# Patient Record
Sex: FEMALE | Race: WHITE | Employment: UNEMPLOYED
[De-identification: names, ages, dates, MRNs, and addresses within clinical notes are randomized per-mention and may not be internally consistent; named-entity substitution may affect disease eponyms.]

---

## 2017-02-07 DIAGNOSIS — F41.1 GAD (GENERALIZED ANXIETY DISORDER): ICD-10-CM

## 2017-02-07 RX ORDER — PROPRANOLOL HCL 60 MG
CAPSULE, EXTENDED RELEASE 24HR ORAL
Qty: 30 CAPSULE | Refills: 2 | Status: SHIPPED | OUTPATIENT
Start: 2017-02-07 | End: 2017-05-12 | Stop reason: SDUPTHER

## 2017-02-08 ENCOUNTER — OFFICE VISIT (OUTPATIENT)
Dept: INTERNAL MEDICINE | Facility: CLINIC | Age: 37
End: 2017-02-08

## 2017-02-08 VITALS
HEART RATE: 86 BPM | HEIGHT: 65 IN | DIASTOLIC BLOOD PRESSURE: 77 MMHG | BODY MASS INDEX: 26.82 KG/M2 | WEIGHT: 161 LBS | SYSTOLIC BLOOD PRESSURE: 116 MMHG

## 2017-02-08 DIAGNOSIS — F41.0 PANIC DISORDER: ICD-10-CM

## 2017-02-08 DIAGNOSIS — F41.1 GAD (GENERALIZED ANXIETY DISORDER): Primary | ICD-10-CM

## 2017-02-08 PROCEDURE — 99213 OFFICE O/P EST LOW 20 MIN: CPT | Performed by: INTERNAL MEDICINE

## 2017-02-08 RX ORDER — ESCITALOPRAM OXALATE 10 MG/1
10 TABLET ORAL DAILY
Qty: 30 TABLET | Refills: 2 | Status: SHIPPED | OUTPATIENT
Start: 2017-02-08 | End: 2017-07-14

## 2017-05-12 DIAGNOSIS — F41.1 GAD (GENERALIZED ANXIETY DISORDER): ICD-10-CM

## 2017-05-12 RX ORDER — PROPRANOLOL HCL 60 MG
CAPSULE, EXTENDED RELEASE 24HR ORAL
Qty: 30 CAPSULE | Refills: 1 | Status: CANCELLED | OUTPATIENT
Start: 2017-05-12

## 2017-05-14 RX ORDER — PROPRANOLOL HCL 60 MG
CAPSULE, EXTENDED RELEASE 24HR ORAL
Qty: 30 CAPSULE | Refills: 2 | Status: SHIPPED | OUTPATIENT
Start: 2017-05-14 | End: 2017-06-30 | Stop reason: SDUPTHER

## 2017-06-01 ENCOUNTER — OFFICE VISIT (OUTPATIENT)
Dept: INTERNAL MEDICINE | Age: 37
End: 2017-06-01
Payer: COMMERCIAL

## 2017-06-01 VITALS
HEART RATE: 74 BPM | SYSTOLIC BLOOD PRESSURE: 153 MMHG | HEIGHT: 65 IN | WEIGHT: 163 LBS | DIASTOLIC BLOOD PRESSURE: 100 MMHG | BODY MASS INDEX: 27.16 KG/M2

## 2017-06-01 DIAGNOSIS — F41.1 GAD (GENERALIZED ANXIETY DISORDER): ICD-10-CM

## 2017-06-01 DIAGNOSIS — I10 ESSENTIAL HYPERTENSION: Primary | ICD-10-CM

## 2017-06-01 PROCEDURE — 99213 OFFICE O/P EST LOW 20 MIN: CPT | Performed by: INTERNAL MEDICINE

## 2017-06-01 RX ORDER — BLOOD PRESSURE TEST KIT
KIT MISCELLANEOUS
Qty: 1 KIT | Refills: 0 | Status: SHIPPED | OUTPATIENT
Start: 2017-06-01 | End: 2017-06-30 | Stop reason: SDUPTHER

## 2017-06-01 RX ORDER — IBUPROFEN 800 MG/1
TABLET ORAL
COMMUNITY
Start: 2017-02-23 | End: 2017-06-07 | Stop reason: ALTCHOICE

## 2017-06-01 RX ORDER — AZITHROMYCIN 250 MG/1
TABLET, FILM COATED ORAL
COMMUNITY
Start: 2017-03-27 | End: 2017-06-07 | Stop reason: ALTCHOICE

## 2017-06-01 RX ORDER — LISINOPRIL 10 MG/1
10 TABLET ORAL DAILY
Qty: 30 TABLET | Refills: 2 | Status: SHIPPED | OUTPATIENT
Start: 2017-06-01 | End: 2017-09-14 | Stop reason: SDUPTHER

## 2017-06-01 ASSESSMENT — PATIENT HEALTH QUESTIONNAIRE - PHQ9
1. LITTLE INTEREST OR PLEASURE IN DOING THINGS: 0
SUM OF ALL RESPONSES TO PHQ9 QUESTIONS 1 & 2: 0
2. FEELING DOWN, DEPRESSED OR HOPELESS: 0
SUM OF ALL RESPONSES TO PHQ QUESTIONS 1-9: 0

## 2017-06-30 ENCOUNTER — OFFICE VISIT (OUTPATIENT)
Dept: INTERNAL MEDICINE | Age: 37
End: 2017-06-30
Payer: COMMERCIAL

## 2017-06-30 ENCOUNTER — HOSPITAL ENCOUNTER (OUTPATIENT)
Age: 37
Setting detail: SPECIMEN
Discharge: HOME | End: 2017-06-30
Payer: COMMERCIAL

## 2017-06-30 VITALS
SYSTOLIC BLOOD PRESSURE: 162 MMHG | HEART RATE: 78 BPM | DIASTOLIC BLOOD PRESSURE: 103 MMHG | WEIGHT: 164 LBS | BODY MASS INDEX: 27.32 KG/M2 | HEIGHT: 65 IN

## 2017-06-30 DIAGNOSIS — R10.13 DYSPEPSIA: ICD-10-CM

## 2017-06-30 DIAGNOSIS — I10 ESSENTIAL HYPERTENSION: ICD-10-CM

## 2017-06-30 DIAGNOSIS — Z12.4 CERVICAL CANCER SCREENING: Primary | ICD-10-CM

## 2017-06-30 DIAGNOSIS — F41.1 GAD (GENERALIZED ANXIETY DISORDER): ICD-10-CM

## 2017-06-30 PROCEDURE — 99213 OFFICE O/P EST LOW 20 MIN: CPT | Performed by: INTERNAL MEDICINE

## 2017-06-30 RX ORDER — BLOOD PRESSURE TEST KIT
KIT MISCELLANEOUS
Qty: 1 KIT | Refills: 0 | Status: SHIPPED | OUTPATIENT
Start: 2017-06-30 | End: 2017-09-01

## 2017-06-30 RX ORDER — PROPRANOLOL HCL 60 MG
CAPSULE, EXTENDED RELEASE 24HR ORAL
Qty: 30 CAPSULE | Refills: 2 | Status: SHIPPED | OUTPATIENT
Start: 2017-06-30 | End: 2017-11-14

## 2017-06-30 RX ORDER — PANTOPRAZOLE SODIUM 20 MG/1
20 TABLET, DELAYED RELEASE ORAL DAILY
Qty: 30 TABLET | Refills: 3 | Status: SHIPPED | OUTPATIENT
Start: 2017-06-30 | End: 2018-01-16 | Stop reason: SDUPTHER

## 2017-07-06 LAB — CYTOLOGY REPORT: NORMAL

## 2017-07-14 ENCOUNTER — OFFICE VISIT (OUTPATIENT)
Dept: INTERNAL MEDICINE | Age: 37
End: 2017-07-14
Payer: COMMERCIAL

## 2017-07-14 VITALS
HEIGHT: 65 IN | DIASTOLIC BLOOD PRESSURE: 64 MMHG | WEIGHT: 163 LBS | SYSTOLIC BLOOD PRESSURE: 139 MMHG | HEART RATE: 60 BPM | BODY MASS INDEX: 27.16 KG/M2

## 2017-07-14 DIAGNOSIS — F41.1 GAD (GENERALIZED ANXIETY DISORDER): Primary | ICD-10-CM

## 2017-07-14 DIAGNOSIS — I10 ESSENTIAL HYPERTENSION: ICD-10-CM

## 2017-07-14 PROCEDURE — 99213 OFFICE O/P EST LOW 20 MIN: CPT | Performed by: INTERNAL MEDICINE

## 2017-07-14 RX ORDER — BUPROPION HYDROCHLORIDE 150 MG/1
150 TABLET ORAL EVERY MORNING
Qty: 30 TABLET | Refills: 3 | Status: SHIPPED | OUTPATIENT
Start: 2017-07-14 | End: 2018-01-16 | Stop reason: SDUPTHER

## 2017-09-01 ENCOUNTER — OFFICE VISIT (OUTPATIENT)
Dept: INTERNAL MEDICINE | Age: 37
End: 2017-09-01
Payer: COMMERCIAL

## 2017-09-01 VITALS
WEIGHT: 170 LBS | SYSTOLIC BLOOD PRESSURE: 112 MMHG | BODY MASS INDEX: 28.32 KG/M2 | HEIGHT: 65 IN | HEART RATE: 64 BPM | DIASTOLIC BLOOD PRESSURE: 83 MMHG

## 2017-09-01 DIAGNOSIS — T78.40XA RASH DUE TO ALLERGY: Primary | ICD-10-CM

## 2017-09-01 DIAGNOSIS — R21 RASH DUE TO ALLERGY: Primary | ICD-10-CM

## 2017-09-01 PROCEDURE — 99212 OFFICE O/P EST SF 10 MIN: CPT | Performed by: INTERNAL MEDICINE

## 2017-09-01 RX ORDER — CETIRIZINE HYDROCHLORIDE 10 MG/1
10 TABLET ORAL DAILY
Qty: 30 TABLET | Refills: 0 | Status: SHIPPED | OUTPATIENT
Start: 2017-09-01 | End: 2018-02-05

## 2017-09-01 RX ORDER — DIAPER,BRIEF,INFANT-TODD,DISP
EACH MISCELLANEOUS
Qty: 3 TUBE | Refills: 0 | Status: SHIPPED | OUTPATIENT
Start: 2017-09-01 | End: 2019-12-11

## 2017-09-14 DIAGNOSIS — I10 ESSENTIAL HYPERTENSION: ICD-10-CM

## 2017-09-15 RX ORDER — LISINOPRIL 10 MG/1
TABLET ORAL
Qty: 30 TABLET | Refills: 1 | Status: SHIPPED | OUTPATIENT
Start: 2017-09-15 | End: 2017-11-14 | Stop reason: SDUPTHER

## 2017-11-14 DIAGNOSIS — I10 ESSENTIAL HYPERTENSION: ICD-10-CM

## 2017-11-14 DIAGNOSIS — F41.1 GAD (GENERALIZED ANXIETY DISORDER): ICD-10-CM

## 2017-11-14 RX ORDER — PROPRANOLOL HCL 60 MG
CAPSULE, EXTENDED RELEASE 24HR ORAL
Qty: 30 CAPSULE | Refills: 1 | Status: SHIPPED | OUTPATIENT
Start: 2017-11-14 | End: 2018-01-16 | Stop reason: SDUPTHER

## 2017-11-14 RX ORDER — LISINOPRIL 10 MG/1
TABLET ORAL
Qty: 30 TABLET | Refills: 1 | Status: SHIPPED | OUTPATIENT
Start: 2017-11-14 | End: 2018-01-16 | Stop reason: SDUPTHER

## 2018-01-16 DIAGNOSIS — R10.13 DYSPEPSIA: ICD-10-CM

## 2018-01-16 DIAGNOSIS — I10 ESSENTIAL HYPERTENSION: ICD-10-CM

## 2018-01-16 DIAGNOSIS — F41.1 GAD (GENERALIZED ANXIETY DISORDER): ICD-10-CM

## 2018-01-16 RX ORDER — BUPROPION HYDROCHLORIDE 150 MG/1
TABLET ORAL
Qty: 30 TABLET | Refills: 2 | Status: SHIPPED | OUTPATIENT
Start: 2018-01-16 | End: 2018-02-05 | Stop reason: SDUPTHER

## 2018-01-16 RX ORDER — PROPRANOLOL HCL 60 MG
CAPSULE, EXTENDED RELEASE 24HR ORAL
Qty: 30 CAPSULE | Refills: 0 | Status: SHIPPED | OUTPATIENT
Start: 2018-01-16 | End: 2018-02-05 | Stop reason: SDUPTHER

## 2018-01-16 RX ORDER — LISINOPRIL 10 MG/1
TABLET ORAL
Qty: 30 TABLET | Refills: 0 | Status: SHIPPED | OUTPATIENT
Start: 2018-01-16 | End: 2018-02-05 | Stop reason: SDUPTHER

## 2018-01-16 RX ORDER — PANTOPRAZOLE SODIUM 20 MG/1
TABLET, DELAYED RELEASE ORAL
Qty: 30 TABLET | Refills: 2 | Status: SHIPPED | OUTPATIENT
Start: 2018-01-16 | End: 2018-02-05 | Stop reason: ALTCHOICE

## 2018-01-16 NOTE — TELEPHONE ENCOUNTER
Refill on Pantoprazole, Propranolol, Lisinopril, and Bupropion. Last seen on 9/1/17. Left VM for patient to call and schedule appointment, medications pending. Next Visit Date:  No future appointments.     Health Maintenance   Topic Date Due    Flu vaccine (1) 09/01/2017    Potassium monitoring  01/26/2018    Creatinine monitoring  01/26/2018    Cervical cancer screen  06/30/2018    DTaP/Tdap/Td vaccine (2 - Td) 10/07/2026    Pneumococcal med risk  Completed    HIV screen  Completed       Hemoglobin A1C (%)   Date Value   03/30/2016 5.2             ( goal A1C is < 7)   No results found for: LABMICR  LDL Cholesterol (mg/dL)   Date Value   03/30/2016 87       (goal LDL is <100)   AST (U/L)   Date Value   03/30/2016 16     ALT (U/L)   Date Value   03/30/2016 13     BUN (mg/dL)   Date Value   01/26/2017 16     BP Readings from Last 3 Encounters:   09/01/17 112/83   07/14/17 139/64   06/30/17 (!) 162/103          (goal 120/80)    All Future Testing planned in [facility]  Lab Frequency Next Occurrence               Patient Active Problem List:     Heavy menstrual bleeding     HUE (generalized anxiety disorder)     Narcotic addiction ([facility] [address]     Panic disorder     Cigarette nicotine dependence without complication     History of heroin abuse     Essential hypertension

## 2018-02-05 ENCOUNTER — OFFICE VISIT (OUTPATIENT)
Dept: INTERNAL MEDICINE | Age: 38
End: 2018-02-05
Payer: COMMERCIAL

## 2018-02-05 ENCOUNTER — HOSPITAL ENCOUNTER (OUTPATIENT)
Age: 38
Setting detail: SPECIMEN
Discharge: HOME | End: 2018-02-05
Payer: COMMERCIAL

## 2018-02-05 VITALS
HEART RATE: 66 BPM | HEIGHT: 65 IN | DIASTOLIC BLOOD PRESSURE: 85 MMHG | WEIGHT: 172 LBS | BODY MASS INDEX: 28.66 KG/M2 | SYSTOLIC BLOOD PRESSURE: 127 MMHG

## 2018-02-05 DIAGNOSIS — R07.89 ATYPICAL CHEST PAIN: ICD-10-CM

## 2018-02-05 DIAGNOSIS — K21.9 GASTROESOPHAGEAL REFLUX DISEASE WITHOUT ESOPHAGITIS: ICD-10-CM

## 2018-02-05 DIAGNOSIS — I10 ESSENTIAL HYPERTENSION: ICD-10-CM

## 2018-02-05 DIAGNOSIS — Z00.00 HEALTH CARE MAINTENANCE: ICD-10-CM

## 2018-02-05 DIAGNOSIS — F41.1 GAD (GENERALIZED ANXIETY DISORDER): ICD-10-CM

## 2018-02-05 DIAGNOSIS — G43.809 OTHER MIGRAINE WITHOUT STATUS MIGRAINOSUS, NOT INTRACTABLE: Primary | ICD-10-CM

## 2018-02-05 LAB
ABSOLUTE EOS #: 0.18 K/UL (ref 0–0.44)
ABSOLUTE IMMATURE GRANULOCYTE: <0.03 K/UL (ref 0–0.3)
ABSOLUTE LYMPH #: 1.54 K/UL (ref 1.1–3.7)
ABSOLUTE MONO #: 0.56 K/UL (ref 0.1–1.2)
ALBUMIN SERPL-MCNC: 4.5 G/DL (ref 3.5–5.2)
ALBUMIN/GLOBULIN RATIO: 1.5 (ref 1–2.5)
ALP BLD-CCNC: 72 U/L (ref 35–104)
ALT SERPL-CCNC: 20 U/L (ref 5–33)
ANION GAP SERPL CALCULATED.3IONS-SCNC: 14 MMOL/L (ref 9–17)
AST SERPL-CCNC: 31 U/L
BASOPHILS # BLD: 1 % (ref 0–2)
BASOPHILS ABSOLUTE: 0.06 K/UL (ref 0–0.2)
BILIRUB SERPL-MCNC: 0.69 MG/DL (ref 0.3–1.2)
BUN BLDV-MCNC: 13 MG/DL (ref 6–20)
BUN/CREAT BLD: NORMAL (ref 9–20)
CALCIUM SERPL-MCNC: 9.1 MG/DL (ref 8.6–10.4)
CHLORIDE BLD-SCNC: 103 MMOL/L (ref 98–107)
CHOLESTEROL/HDL RATIO: 3.1
CHOLESTEROL: 176 MG/DL
CO2: 21 MMOL/L (ref 20–31)
CREAT SERPL-MCNC: 0.61 MG/DL (ref 0.5–0.9)
DIFFERENTIAL TYPE: NORMAL
EOSINOPHILS RELATIVE PERCENT: 3 % (ref 1–4)
ESTIMATED AVERAGE GLUCOSE: 80 MG/DL
GFR AFRICAN AMERICAN: >60 ML/MIN
GFR NON-AFRICAN AMERICAN: >60 ML/MIN
GFR SERPL CREATININE-BSD FRML MDRD: NORMAL ML/MIN/{1.73_M2}
GFR SERPL CREATININE-BSD FRML MDRD: NORMAL ML/MIN/{1.73_M2}
GLUCOSE BLD-MCNC: 99 MG/DL (ref 70–99)
HBA1C MFR BLD: 4.4 % (ref 4–6)
HCT VFR BLD CALC: 42.6 % (ref 36.3–47.1)
HDLC SERPL-MCNC: 57 MG/DL
HEMOGLOBIN: 14.2 G/DL (ref 11.9–15.1)
IMMATURE GRANULOCYTES: 0 %
LDL CHOLESTEROL: 84 MG/DL (ref 0–130)
LYMPHOCYTES # BLD: 24 % (ref 24–43)
MCH RBC QN AUTO: 30.5 PG (ref 25.2–33.5)
MCHC RBC AUTO-ENTMCNC: 33.3 G/DL (ref 28.4–34.8)
MCV RBC AUTO: 91.4 FL (ref 82.6–102.9)
MONOCYTES # BLD: 9 % (ref 3–12)
NRBC AUTOMATED: 0 PER 100 WBC
PDW BLD-RTO: 12.2 % (ref 11.8–14.4)
PLATELET # BLD: 239 K/UL (ref 138–453)
PLATELET ESTIMATE: NORMAL
PMV BLD AUTO: 10.3 FL (ref 8.1–13.5)
POTASSIUM SERPL-SCNC: 4.8 MMOL/L (ref 3.7–5.3)
RBC # BLD: 4.66 M/UL (ref 3.95–5.11)
RBC # BLD: NORMAL 10*6/UL
SEG NEUTROPHILS: 63 % (ref 36–65)
SEGMENTED NEUTROPHILS ABSOLUTE COUNT: 4.09 K/UL (ref 1.5–8.1)
SODIUM BLD-SCNC: 138 MMOL/L (ref 135–144)
TOTAL PROTEIN: 7.5 G/DL (ref 6.4–8.3)
TRIGL SERPL-MCNC: 175 MG/DL
VLDLC SERPL CALC-MCNC: ABNORMAL MG/DL (ref 1–30)
WBC # BLD: 6.4 K/UL (ref 3.5–11.3)
WBC # BLD: NORMAL 10*3/UL

## 2018-02-05 PROCEDURE — 85025 COMPLETE CBC W/AUTO DIFF WBC: CPT

## 2018-02-05 PROCEDURE — 99214 OFFICE O/P EST MOD 30 MIN: CPT | Performed by: INTERNAL MEDICINE

## 2018-02-05 PROCEDURE — 80061 LIPID PANEL: CPT

## 2018-02-05 PROCEDURE — 36415 COLL VENOUS BLD VENIPUNCTURE: CPT

## 2018-02-05 PROCEDURE — 80053 COMPREHEN METABOLIC PANEL: CPT

## 2018-02-05 PROCEDURE — 83036 HEMOGLOBIN GLYCOSYLATED A1C: CPT

## 2018-02-05 RX ORDER — OMEPRAZOLE 20 MG/1
20 CAPSULE, DELAYED RELEASE ORAL
Qty: 90 CAPSULE | Refills: 1 | Status: SHIPPED | OUTPATIENT
Start: 2018-02-05 | End: 2018-08-21 | Stop reason: SDUPTHER

## 2018-02-05 RX ORDER — LISINOPRIL 10 MG/1
TABLET ORAL
Qty: 90 TABLET | Refills: 1 | Status: SHIPPED | OUTPATIENT
Start: 2018-02-05 | End: 2018-08-15 | Stop reason: SDUPTHER

## 2018-02-05 RX ORDER — BUPROPION HYDROCHLORIDE 150 MG/1
TABLET ORAL
Qty: 90 TABLET | Refills: 1 | Status: SHIPPED | OUTPATIENT
Start: 2018-02-05 | End: 2018-08-13 | Stop reason: SDUPTHER

## 2018-02-05 RX ORDER — PROPRANOLOL HCL 60 MG
CAPSULE, EXTENDED RELEASE 24HR ORAL
Qty: 90 CAPSULE | Refills: 1 | Status: SHIPPED | OUTPATIENT
Start: 2018-02-05 | End: 2018-08-15 | Stop reason: SDUPTHER

## 2018-02-05 RX ORDER — ASPIRIN 81 MG/1
81 TABLET, CHEWABLE ORAL DAILY
Qty: 90 TABLET | Refills: 1 | Status: SHIPPED | OUTPATIENT
Start: 2018-02-05 | End: 2018-11-02 | Stop reason: SDUPTHER

## 2018-02-05 RX ORDER — TOPIRAMATE 25 MG/1
25 TABLET ORAL 2 TIMES DAILY
Qty: 60 TABLET | Refills: 2 | Status: SHIPPED | OUTPATIENT
Start: 2018-02-05 | End: 2018-08-21

## 2018-02-05 NOTE — PROGRESS NOTES
appearance - well appearing, not in  distress. Mental status - alert and cooperative . Head: Normocephalic, without obvious abnormality, atraumatic. Eye: PERRL, conjunctiva/corneas clear, EOM's intact. Neck - Supple, no significant adenopathy . Chest - Clear to auscultation, no wheezes, rales or rhonchi, symmetric air entry. Heart -  regular rhythm, normal S1, S2, no murmurs. Abdomen - Soft, nontender, nondistended, no masses or organomegaly. Neurological - Alert, oriented, normal speech, no focal findings or movement disorder noted. Musculoskeletal - No joint tenderness, deformity or swelling. Extremities -  No pedal edema, no clubbing or cyanosis. Labs:       Chemistry        Component Value Date/Time     01/26/2017 1023    K 4.5 01/26/2017 1023     01/26/2017 1023    CO2 17 (L) 01/26/2017 1023    BUN 16 01/26/2017 1023    CREATININE 0.77 01/26/2017 1023        Component Value Date/Time    CALCIUM 9.2 01/26/2017 1023    ALKPHOS 86 03/30/2016 1048    AST 16 03/30/2016 1048    ALT 13 03/30/2016 1048    BILITOT 0.38 03/30/2016 1048          No results for input(s): GLUCOSE in the last 72 hours. Lab Results   Component Value Date    WBC 11.8 (H) 01/26/2017    HGB 14.7 01/26/2017    HCT 41.7 01/26/2017    MCV 84.6 01/26/2017     01/26/2017     Lab Results   Component Value Date    TSH 1.73 04/26/2016     Lab Results   Component Value Date    LABA1C 5.2 03/30/2016     No results found for: LABMICR, CNMS10NRT  No components found for: CHLPL  Lab Results   Component Value Date    TRIG 101 03/30/2016     Lab Results   Component Value Date    HDL 44 03/30/2016     Lab Results   Component Value Date    LDLCHOLESTEROL 87 03/30/2016     The ASCVD Risk score (Roverto Pandya et al., 2013) failed to calculate for the following reasons:     The 2013 ASCVD risk score is only valid for ages 36 to 78    Health Maintenance Due   Topic Date Due    Flu vaccine (1) 09/01/2017    Potassium monitoring  01/26/2018    Creatinine monitoring  01/26/2018        ASSESSMENT AND PLAN    1. Essential hypertension  Improved with current medication.   - lisinopril (PRINIVIL;ZESTRIL) 10 MG tablet; TAKE ONE TABLET BY MOUTH DAILY  Dispense: 90 tablet; Refill: 1  - CBC Auto Differential; Future  - Comprehensive Metabolic Panel; Future  - Lipid Panel; Future    2. HUE (generalized anxiety disorder)  Improved with current medication. - propranolol (INDERAL LA) 60 MG extended release capsule; TAKE ONE CAPSULE BY MOUTH DAILY  Dispense: 90 capsule; Refill: 1  - buPROPion (WELLBUTRIN XL) 150 MG extended release tablet; TAKE ONE TABLET BY MOUTH EVERY MORNING  Dispense: 90 tablet; Refill: 1    3. Atypical chest pain  Resolved , was due to anxiety. - aspirin 81 MG chewable tablet; Take 1 tablet by mouth daily  Dispense: 90 tablet; Refill: 1    4. Other migraine without status migrainosus, not intractable    - topiramate (TOPAMAX) 25 MG tablet; Take 1 tablet by mouth 2 times daily  Dispense: 60 tablet; Refill: 2    5. Gastroesophageal reflux disease without esophagitis    - omeprazole (PRILOSEC) 20 MG delayed release capsule; Take 1 capsule by mouth daily (with breakfast)  Dispense: 90 capsule; Refill: 1    6. Health care maintenance    - Hemoglobin A1C; Future      · Labs on ordered   · Start Topamax for Migraine headaches   · Change protonix to omeprazole . · meds refilled   · Ashish Saldana received counseling on the following healthy behaviors: exercise and medication adherence  · Discussed use, benefit, and side effects of prescribed medications. Barriers to medication compliance addressed. All patient questions answered. Pt voiced understanding.    · The return visit should be in 6 months      Wilberto Bolden MD  Attending and Faculty Internal Medicine  [facility] [address] [facility]  [address]  Dept: [phone]  2/5/18      This note is created with the

## 2018-02-05 NOTE — PATIENT INSTRUCTIONS
Clinic will contact patient for six (6) month follow up, [phone], option 3. Your doctor has ordered fasting blood work. It can be done at [facility] or at the hospital. Casey Stokes will need to fast for 12 hours and bring order with you to registration department.     Anil Hernández

## 2018-06-18 DIAGNOSIS — R10.13 DYSPEPSIA: ICD-10-CM

## 2018-06-18 RX ORDER — PANTOPRAZOLE SODIUM 20 MG/1
TABLET, DELAYED RELEASE ORAL
Qty: 30 TABLET | Refills: 1 | Status: SHIPPED | OUTPATIENT
Start: 2018-06-18 | End: 2018-08-21

## 2018-08-13 DIAGNOSIS — F41.1 GAD (GENERALIZED ANXIETY DISORDER): ICD-10-CM

## 2018-08-13 RX ORDER — BUPROPION HYDROCHLORIDE 150 MG/1
TABLET ORAL
Qty: 90 TABLET | Refills: 1 | Status: SHIPPED | OUTPATIENT
Start: 2018-08-13 | End: 2018-11-02 | Stop reason: SDUPTHER

## 2018-08-13 NOTE — TELEPHONE ENCOUNTER
PC from patient requesting refill on Wellbutrin, Medication Pended. Phone Number and Pharmacy Confirmed. Pt last seen on 2/5/18, Next Appt is 8/21/18.     Health Maintenance   Topic Date Due    Cervical cancer screen  06/30/2018    Flu vaccine (1) 09/01/2018    Potassium monitoring  02/05/2019    Creatinine monitoring  02/05/2019    DTaP/Tdap/Td vaccine (2 - Td) 10/07/2026    HIV screen  Completed       Hemoglobin A1C (%)   Date Value   02/05/2018 4.4   03/30/2016 5.2             ( goal A1C is < 7)   No results found for: LABMICR  LDL Cholesterol (mg/dL)   Date Value   02/05/2018 84       (goal LDL is <100)   AST (U/L)   Date Value   02/05/2018 31     ALT (U/L)   Date Value   02/05/2018 20     BUN (mg/dL)   Date Value   02/05/2018 13     BP Readings from Last 3 Encounters:   02/05/18 127/85   09/01/17 112/83   07/14/17 139/64          (goal 120/80)    All Future Testing planned in [facility]  Lab Frequency Next Occurrence       Next Visit Date:  Future Appointments  Date Time Provider Paul Daii   8/15/2018 10:00 AM Benita Arreguin PSYD [facility] [address]   8/21/2018 1:45 PM Alexsander Floyd MD [facility]            Patient Active Problem List:     Heavy menstrual bleeding     HUE (generalized anxiety disorder)     Narcotic addiction ([facility] [address]     Panic disorder     Cigarette nicotine dependence without complication     History of heroin abuse     Essential hypertension

## 2018-08-21 ENCOUNTER — OFFICE VISIT (OUTPATIENT)
Dept: INTERNAL MEDICINE | Age: 38
End: 2018-08-21
Payer: COMMERCIAL

## 2018-08-21 VITALS
DIASTOLIC BLOOD PRESSURE: 81 MMHG | BODY MASS INDEX: 26.49 KG/M2 | HEART RATE: 72 BPM | HEIGHT: 65 IN | WEIGHT: 159 LBS | SYSTOLIC BLOOD PRESSURE: 122 MMHG

## 2018-08-21 DIAGNOSIS — J30.9 ALLERGIC SINUSITIS: ICD-10-CM

## 2018-08-21 DIAGNOSIS — I10 ESSENTIAL HYPERTENSION: Primary | ICD-10-CM

## 2018-08-21 DIAGNOSIS — K21.9 GASTROESOPHAGEAL REFLUX DISEASE WITHOUT ESOPHAGITIS: ICD-10-CM

## 2018-08-21 PROCEDURE — 99213 OFFICE O/P EST LOW 20 MIN: CPT | Performed by: INTERNAL MEDICINE

## 2018-08-21 RX ORDER — FEXOFENADINE HCL 180 MG/1
180 TABLET ORAL DAILY
Qty: 30 TABLET | Refills: 2 | Status: SHIPPED | OUTPATIENT
Start: 2018-08-21 | End: 2019-10-15

## 2018-08-21 RX ORDER — OMEPRAZOLE 20 MG/1
20 CAPSULE, DELAYED RELEASE ORAL
Qty: 90 CAPSULE | Refills: 1 | Status: SHIPPED | OUTPATIENT
Start: 2018-08-21 | End: 2018-11-02

## 2018-08-21 ASSESSMENT — PATIENT HEALTH QUESTIONNAIRE - PHQ9
1. LITTLE INTEREST OR PLEASURE IN DOING THINGS: 0
SUM OF ALL RESPONSES TO PHQ QUESTIONS 1-9: 0
SUM OF ALL RESPONSES TO PHQ QUESTIONS 1-9: 0
SUM OF ALL RESPONSES TO PHQ9 QUESTIONS 1 & 2: 0
2. FEELING DOWN, DEPRESSED OR HOPELESS: 0

## 2018-08-21 NOTE — PROGRESS NOTES
Chronic Disease Visit Information    BP Readings from Last 3 Encounters:   02/05/18 127/85   09/01/17 112/83   07/14/17 139/64          Hemoglobin A1C (%)   Date Value   02/05/2018 4.4   03/30/2016 5.2     LDL Cholesterol (mg/dL)   Date Value   02/05/2018 84     HDL (mg/dL)   Date Value   02/05/2018 57     BUN (mg/dL)   Date Value   02/05/2018 13     CREATININE (mg/dL)   Date Value   02/05/2018 0.61     Glucose (mg/dL)   Date Value   02/05/2018 99            Have you changed or started any medications since your last visit including any over-the-counter medicines, vitamins, or herbal medicines? no   Are you having any side effects from any of your medications? -  no  Have you stopped taking any of your medications? Is so, why? -  no    Have you seen any other physician or provider since your last visit? No  Have you had any other diagnostic tests since your last visit? No  Have you been seen in the emergency room and/or had an admission to a hospital since we last saw you? Yes - Records Requested  Have you had your annual diabetic retinal (eye) exam? No  Have you had your routine dental cleaning in the past 6 months? no    Have you activated your [employer] account? If not, what are your barriers?  No: declined     Patient Care Team:  Jackie Truong MD as PCP - General (Internal Medicine)         Medical History Review  Past Medical, Family, and Social History reviewed and does not contribute to the patient presenting condition    Health Maintenance   Topic Date Due    Cervical cancer screen  06/30/2018    Flu vaccine (1) 09/01/2018    Potassium monitoring  02/05/2019    Creatinine monitoring  02/05/2019    DTaP/Tdap/Td vaccine (2 - Td) 10/07/2026    HIV screen  Completed

## 2018-11-02 ENCOUNTER — OFFICE VISIT (OUTPATIENT)
Dept: INTERNAL MEDICINE | Age: 38
End: 2018-11-02
Payer: COMMERCIAL

## 2018-11-02 VITALS
BODY MASS INDEX: 25.83 KG/M2 | WEIGHT: 155 LBS | DIASTOLIC BLOOD PRESSURE: 78 MMHG | SYSTOLIC BLOOD PRESSURE: 113 MMHG | HEIGHT: 65 IN | HEART RATE: 76 BPM

## 2018-11-02 DIAGNOSIS — N91.2 AMENORRHEA: ICD-10-CM

## 2018-11-02 DIAGNOSIS — I10 ESSENTIAL HYPERTENSION: Primary | ICD-10-CM

## 2018-11-02 DIAGNOSIS — F41.1 GAD (GENERALIZED ANXIETY DISORDER): ICD-10-CM

## 2018-11-02 DIAGNOSIS — R53.83 FATIGUE, UNSPECIFIED TYPE: ICD-10-CM

## 2018-11-02 DIAGNOSIS — R07.89 ATYPICAL CHEST PAIN: ICD-10-CM

## 2018-11-02 PROCEDURE — 99213 OFFICE O/P EST LOW 20 MIN: CPT | Performed by: INTERNAL MEDICINE

## 2018-11-02 PROCEDURE — 99211 OFF/OP EST MAY X REQ PHY/QHP: CPT | Performed by: INTERNAL MEDICINE

## 2018-11-02 RX ORDER — LISINOPRIL 10 MG/1
TABLET ORAL
Qty: 90 TABLET | Refills: 1 | Status: SHIPPED | OUTPATIENT
Start: 2018-11-02 | End: 2019-02-26 | Stop reason: SDUPTHER

## 2018-11-02 RX ORDER — BUPROPION HYDROCHLORIDE 300 MG/1
TABLET ORAL
Qty: 90 TABLET | Refills: 1 | Status: SHIPPED | OUTPATIENT
Start: 2018-11-02 | End: 2019-02-26 | Stop reason: SDUPTHER

## 2018-11-02 RX ORDER — PROPRANOLOL HCL 60 MG
CAPSULE, EXTENDED RELEASE 24HR ORAL
Qty: 90 CAPSULE | Refills: 1 | Status: SHIPPED | OUTPATIENT
Start: 2018-11-02 | End: 2019-02-26 | Stop reason: SDUPTHER

## 2018-11-02 RX ORDER — ASPIRIN 81 MG/1
81 TABLET, CHEWABLE ORAL DAILY
Qty: 90 TABLET | Refills: 1 | Status: SHIPPED | OUTPATIENT
Start: 2018-11-02 | End: 2019-02-26 | Stop reason: SDUPTHER

## 2018-11-02 RX ORDER — OMEPRAZOLE 20 MG/1
20 CAPSULE, DELAYED RELEASE ORAL
Qty: 90 CAPSULE | Refills: 1 | Status: SHIPPED | OUTPATIENT
Start: 2018-11-02 | End: 2019-02-26 | Stop reason: SDUPTHER

## 2018-11-27 ENCOUNTER — OFFICE VISIT (OUTPATIENT)
Dept: OBGYN CLINIC | Age: 38
End: 2018-11-27
Payer: COMMERCIAL

## 2018-11-27 ENCOUNTER — HOSPITAL ENCOUNTER (OUTPATIENT)
Age: 38
Setting detail: SPECIMEN
Discharge: HOME | End: 2018-11-27
Payer: COMMERCIAL

## 2018-11-27 VITALS
WEIGHT: 159.38 LBS | HEIGHT: 65 IN | BODY MASS INDEX: 26.55 KG/M2 | DIASTOLIC BLOOD PRESSURE: 78 MMHG | HEART RATE: 86 BPM | SYSTOLIC BLOOD PRESSURE: 110 MMHG

## 2018-11-27 DIAGNOSIS — R45.86 MOOD SWINGS: ICD-10-CM

## 2018-11-27 DIAGNOSIS — Z01.419 WELL FEMALE EXAM WITH ROUTINE GYNECOLOGICAL EXAM: Primary | ICD-10-CM

## 2018-11-27 DIAGNOSIS — R53.83 FATIGUE, UNSPECIFIED TYPE: ICD-10-CM

## 2018-11-27 LAB — TSH SERPL DL<=0.05 MIU/L-ACNC: 1.38 MIU/L (ref 0.3–5)

## 2018-11-27 PROCEDURE — 99385 PREV VISIT NEW AGE 18-39: CPT | Performed by: OBSTETRICS & GYNECOLOGY

## 2018-11-28 NOTE — PROGRESS NOTES
and rhythm. . No S3 or S4. There was no murmur appreciated. Location, grade, and radiation are not applicable. Extremities: The patients extremities were without calf tenderness, edema, or varicosities. There was full range of motion in all four extremities. Abdomen: The abdomen was soft and non-tender. There were good bowel sounds in all quadrants and there was no guarding, rebound or rigidity. On evaluation there was no evidence of hepatosplenomegaly and there was no costal vertebral bianca tenderness bilaterally. No hernias were appreciated. Abdominal Scars: healed    Psych: The patient had a normal Orientation to: Time, Place, Person, and Situation  There is no Mood / Affect changes    Breast:  (Chest)  normal appearance, no masses or tenderness  Self breast exams were reviewed in detail. Literature was given. Pelvic Exam:  Vulva and vagina appear normal. Bimanual exam reveals normal uterus and adnexa. Rectal Exam:  def      Musculosk:  Normal Gait and station was noted. Digits were evaluated without abnormal findings. Range of motion, stability and strength were evaluated and found to be appropriate for the patients age. ASSESSMENT:      45 y.o. Annual  1. Well female exam with routine gynecological exam    2.  Mood swings         Chief Complaint   Patient presents with   [facility]    Gynecologic Exam          Past Medical History:   Diagnosis Date    Abnormal Pap smear of cervix     Anxiety     Chest pain     pt states had normal stress test    Depression     Drug abuse (HCC)     opiates    Heartburn     HSV-2 infection     Hypertension     Menorrhagia     Osteoarthritis     Pneumonia due to Staphylococcus aureus ([address]     PTSD (post-traumatic stress disorder)     Trichomonosis 4/18/16    on pap 4/18/16         Patient Active Problem List   Diagnosis    Heavy menstrual bleeding    HUE (generalized anxiety disorder)    Narcotic addiction ([address]    Panic disorder    Cigarette nicotine dependence without complication    History of heroin abuse    Essential hypertension          Hereditary Breast, Ovarian, Colon and Uterine Cancer screening Done. Tobacco & Secondary smoke risks reviewed; instructed on cessation and avoidance      Counseling Completed:  annual   PMS with migraines, has history of addiction, discussed C0q10 and Magnesium          PLAN:    Repeat pap every 1 year if negative. Mammograms every 1 year. If 35 yo and last mammogram was negative. Calcium and Vitamin D dosing reviewed. Colonoscopy screening reviewed as well as onset for bone density testing. Birth control and barrier recommendations discussed. STD counseling and prevention reviewed. Gardisil counseling completed for all patients 7-35 yo. Routine health maintenance per patients PCP.

## 2018-12-10 LAB — CYTOLOGY REPORT: NORMAL

## 2019-02-26 ENCOUNTER — OFFICE VISIT (OUTPATIENT)
Dept: INTERNAL MEDICINE | Age: 39
End: 2019-02-26
Payer: COMMERCIAL

## 2019-02-26 VITALS
WEIGHT: 155 LBS | SYSTOLIC BLOOD PRESSURE: 115 MMHG | DIASTOLIC BLOOD PRESSURE: 75 MMHG | BODY MASS INDEX: 25.79 KG/M2 | HEART RATE: 70 BPM

## 2019-02-26 DIAGNOSIS — I10 ESSENTIAL HYPERTENSION: ICD-10-CM

## 2019-02-26 DIAGNOSIS — M25.511 CHRONIC RIGHT SHOULDER PAIN: ICD-10-CM

## 2019-02-26 DIAGNOSIS — F41.1 GAD (GENERALIZED ANXIETY DISORDER): Primary | ICD-10-CM

## 2019-02-26 DIAGNOSIS — K21.9 GASTROESOPHAGEAL REFLUX DISEASE WITHOUT ESOPHAGITIS: ICD-10-CM

## 2019-02-26 DIAGNOSIS — F32.A DEPRESSION, UNSPECIFIED DEPRESSION TYPE: ICD-10-CM

## 2019-02-26 DIAGNOSIS — R07.89 ATYPICAL CHEST PAIN: ICD-10-CM

## 2019-02-26 DIAGNOSIS — Z00.00 HEALTH CARE MAINTENANCE: ICD-10-CM

## 2019-02-26 DIAGNOSIS — G89.29 CHRONIC RIGHT SHOULDER PAIN: ICD-10-CM

## 2019-02-26 PROCEDURE — 99211 OFF/OP EST MAY X REQ PHY/QHP: CPT | Performed by: INTERNAL MEDICINE

## 2019-02-26 PROCEDURE — 99214 OFFICE O/P EST MOD 30 MIN: CPT | Performed by: INTERNAL MEDICINE

## 2019-02-26 RX ORDER — PROPRANOLOL HCL 60 MG
CAPSULE, EXTENDED RELEASE 24HR ORAL
Qty: 90 CAPSULE | Refills: 1 | Status: SHIPPED | OUTPATIENT
Start: 2019-02-26 | End: 2019-10-15 | Stop reason: SDUPTHER

## 2019-02-26 RX ORDER — BUPROPION HYDROCHLORIDE 300 MG/1
TABLET ORAL
Qty: 90 TABLET | Refills: 1 | Status: SHIPPED | OUTPATIENT
Start: 2019-02-26 | End: 2019-10-15 | Stop reason: SDUPTHER

## 2019-02-26 RX ORDER — HYDROXYZINE HYDROCHLORIDE 25 MG/1
25 TABLET, FILM COATED ORAL 3 TIMES DAILY PRN
Qty: 60 TABLET | Refills: 1 | Status: SHIPPED | OUTPATIENT
Start: 2019-02-26 | End: 2019-06-13 | Stop reason: SDUPTHER

## 2019-02-26 RX ORDER — ASPIRIN 81 MG/1
81 TABLET, CHEWABLE ORAL DAILY
Qty: 90 TABLET | Refills: 1 | Status: SHIPPED | OUTPATIENT
Start: 2019-02-26 | End: 2019-12-11

## 2019-02-26 RX ORDER — LISINOPRIL 10 MG/1
TABLET ORAL
Qty: 90 TABLET | Refills: 1 | Status: SHIPPED | OUTPATIENT
Start: 2019-02-26 | End: 2019-10-15 | Stop reason: SDUPTHER

## 2019-02-26 RX ORDER — OMEPRAZOLE 20 MG/1
20 CAPSULE, DELAYED RELEASE ORAL
Qty: 90 CAPSULE | Refills: 1 | Status: SHIPPED | OUTPATIENT
Start: 2019-02-26 | End: 2019-10-15 | Stop reason: SDUPTHER

## 2019-02-26 ASSESSMENT — PATIENT HEALTH QUESTIONNAIRE - PHQ9
SUM OF ALL RESPONSES TO PHQ QUESTIONS 1-9: 0
SUM OF ALL RESPONSES TO PHQ QUESTIONS 1-9: 0
SUM OF ALL RESPONSES TO PHQ9 QUESTIONS 1 & 2: 0
1. LITTLE INTEREST OR PLEASURE IN DOING THINGS: 0
2. FEELING DOWN, DEPRESSED OR HOPELESS: 0

## 2019-06-04 ENCOUNTER — TELEPHONE (OUTPATIENT)
Dept: INTERNAL MEDICINE | Age: 39
End: 2019-06-04

## 2019-06-04 NOTE — LETTER
[address]  [address] [facility] [address] [facility]  Phone: [phone]  Fax: [phone]    Nadine Gay MD        June 4, 2019    [address]  [facility] [address]      Dear Norma Mireles: We are sending this letter because your PCP ordered [facility] for you to have done at your last visit here and they have not yet been completed. If you can please come to our office on the 2nd floor to  your orders to have them compelted. If you do not have a follow-up appointment scheduled you can either contact the office to make an appointment with us or you can make one when you come in to pick-up your orders. If you have any questions or concerns, please don't hesitate to call.     Sincerely,        Nadine Gay MD

## 2019-06-13 DIAGNOSIS — F41.1 GAD (GENERALIZED ANXIETY DISORDER): ICD-10-CM

## 2019-06-13 RX ORDER — HYDROXYZINE HYDROCHLORIDE 25 MG/1
TABLET, FILM COATED ORAL
Qty: 60 TABLET | Refills: 0 | Status: SHIPPED | OUTPATIENT
Start: 2019-06-13 | End: 2019-08-08 | Stop reason: SDUPTHER

## 2019-06-13 NOTE — TELEPHONE ENCOUNTER
E-scribe request for Hydroxyzine 25 MG. Please review and e-scribe if applicable. Next Visit Date:  Visit date not found    Health Maintenance   Topic Date Due    Varicella Vaccine (1 of 2 - 13+ 2-dose series) 04/14/1993    Potassium monitoring  02/05/2019    Creatinine monitoring  02/05/2019    Flu vaccine (Season Ended) 09/01/2019    Cervical cancer screen  11/27/2019    DTaP/Tdap/Td vaccine (2 - Td) 10/07/2026    HIV screen  Completed    Pneumococcal 0-64 years Vaccine  Aged Out             (applicable per patient's age: Cancer Screenings, Depression Screening, Fall Risk Screening, Immunizations)    Hemoglobin A1C (%)   Date Value   02/05/2018 4.4   03/30/2016 5.2     LDL Cholesterol (mg/dL)   Date Value   02/05/2018 84     AST (U/L)   Date Value   02/05/2018 31     ALT (U/L)   Date Value   02/05/2018 20     BUN (mg/dL)   Date Value   02/05/2018 13      (goal A1C is < 7)   (goal LDL is <100) need 30-50% reduction from baseline     BP Readings from Last 3 Encounters:   02/26/19 115/75   11/27/18 110/78   11/02/18 113/78    (goal /80)      All Future Testing planned in [facility]:  Lab Frequency Next Occurrence   CBC Auto Differential Once 07/26/2019   Comprehensive Metabolic Panel Once 06/15/1176   Hemoglobin A1C Once 09/04/2019   Lipid Panel Once 07/26/2019       Next Visit Date:  No future appointments.          Patient Active Problem List:     Heavy menstrual bleeding     HUE (generalized anxiety disorder)     Narcotic addiction ([facility] [address]     Panic disorder     Cigarette nicotine dependence without complication     History of heroin abuse     Essential hypertension

## 2019-08-08 DIAGNOSIS — F41.1 GAD (GENERALIZED ANXIETY DISORDER): ICD-10-CM

## 2019-08-09 RX ORDER — HYDROXYZINE HYDROCHLORIDE 25 MG/1
25 TABLET, FILM COATED ORAL 3 TIMES DAILY PRN
Qty: 30 TABLET | Refills: 0 | Status: SHIPPED | OUTPATIENT
Start: 2019-08-09 | End: 2019-10-15 | Stop reason: SDUPTHER

## 2019-09-12 ENCOUNTER — TELEPHONE (OUTPATIENT)
Dept: INTERNAL MEDICINE | Age: 39
End: 2019-09-12

## 2019-09-12 NOTE — LETTER
[address]  [address] [facility] [address]  Phone: [phone]  Fax: [phone]    Brittnee Gallagher MD        September 12, 2019    [address] [place] [id number]      Dear Norm Winston: We are sending this letter because your PCP ordered [facility] for you to have done at your last visit here and they have not yet been completed. If you can please come to our office on the 2nd floor to  your orders to have them compelted. If you do not have a follow-up appointment scheduled you can either contact the office to make an appointment with us or you can make one when you come in to pick-up your orders. If you have any questions or concerns, please don't hesitate to call.     Sincerely,        Brittnee Gallagher MD

## 2019-09-26 ENCOUNTER — HOSPITAL ENCOUNTER (OUTPATIENT)
Age: 39
Setting detail: SPECIMEN
Discharge: HOME | End: 2019-09-26
Payer: COMMERCIAL

## 2019-09-26 ENCOUNTER — HOSPITAL ENCOUNTER (OUTPATIENT)
Age: 39
Discharge: HOME | End: 2019-09-26
Payer: COMMERCIAL

## 2019-09-26 DIAGNOSIS — Z00.00 HEALTH CARE MAINTENANCE: ICD-10-CM

## 2019-09-26 DIAGNOSIS — I10 ESSENTIAL HYPERTENSION: ICD-10-CM

## 2019-09-26 LAB
ABSOLUTE EOS #: 0.13 K/UL (ref 0–0.44)
ABSOLUTE IMMATURE GRANULOCYTE: <0.03 K/UL (ref 0–0.3)
ABSOLUTE LYMPH #: 1.64 K/UL (ref 1.1–3.7)
ABSOLUTE MONO #: 0.32 K/UL (ref 0.1–1.2)
ALBUMIN SERPL-MCNC: 4.9 G/DL (ref 3.5–5.2)
ALBUMIN/GLOBULIN RATIO: 1.7 (ref 1–2.5)
ALP BLD-CCNC: 66 U/L (ref 35–104)
ALT SERPL-CCNC: 17 U/L (ref 5–33)
ANION GAP SERPL CALCULATED.3IONS-SCNC: 21 MMOL/L (ref 9–17)
AST SERPL-CCNC: 20 U/L
BASOPHILS # BLD: 1 % (ref 0–2)
BASOPHILS ABSOLUTE: 0.04 K/UL (ref 0–0.2)
BILIRUB SERPL-MCNC: 0.59 MG/DL (ref 0.3–1.2)
BUN BLDV-MCNC: 6 MG/DL (ref 6–20)
BUN/CREAT BLD: ABNORMAL (ref 9–20)
CALCIUM SERPL-MCNC: 9.7 MG/DL (ref 8.6–10.4)
CHLORIDE BLD-SCNC: 105 MMOL/L (ref 98–107)
CHOLESTEROL, FASTING: 173 MG/DL
CHOLESTEROL/HDL RATIO: 3.5
CO2: 15 MMOL/L (ref 20–31)
CREAT SERPL-MCNC: 0.71 MG/DL (ref 0.5–0.9)
DIFFERENTIAL TYPE: ABNORMAL
EOSINOPHILS RELATIVE PERCENT: 2 % (ref 1–4)
ESTIMATED AVERAGE GLUCOSE: 88 MG/DL
GFR AFRICAN AMERICAN: >60 ML/MIN
GFR NON-AFRICAN AMERICAN: >60 ML/MIN
GFR SERPL CREATININE-BSD FRML MDRD: ABNORMAL ML/MIN/{1.73_M2}
GFR SERPL CREATININE-BSD FRML MDRD: ABNORMAL ML/MIN/{1.73_M2}
GLUCOSE BLD-MCNC: 80 MG/DL (ref 70–99)
HBA1C MFR BLD: 4.7 % (ref 4–6)
HCT VFR BLD CALC: 45.9 % (ref 36.3–47.1)
HDLC SERPL-MCNC: 50 MG/DL
HEMOGLOBIN: 14.7 G/DL (ref 11.9–15.1)
IMMATURE GRANULOCYTES: 0 %
LDL CHOLESTEROL: 100 MG/DL (ref 0–130)
LYMPHOCYTES # BLD: 31 % (ref 24–43)
MCH RBC QN AUTO: 28.5 PG (ref 25.2–33.5)
MCHC RBC AUTO-ENTMCNC: 32 G/DL (ref 28.4–34.8)
MCV RBC AUTO: 89 FL (ref 82.6–102.9)
MONOCYTES # BLD: 6 % (ref 3–12)
NRBC AUTOMATED: 0 PER 100 WBC
PDW BLD-RTO: 11.9 % (ref 11.8–14.4)
PLATELET # BLD: 274 K/UL (ref 138–453)
PLATELET ESTIMATE: ABNORMAL
PMV BLD AUTO: 10.4 FL (ref 8.1–13.5)
POTASSIUM SERPL-SCNC: 4.4 MMOL/L (ref 3.7–5.3)
RBC # BLD: 5.16 M/UL (ref 3.95–5.11)
RBC # BLD: ABNORMAL 10*6/UL
SEG NEUTROPHILS: 60 % (ref 36–65)
SEGMENTED NEUTROPHILS ABSOLUTE COUNT: 3.17 K/UL (ref 1.5–8.1)
SODIUM BLD-SCNC: 141 MMOL/L (ref 135–144)
TOTAL PROTEIN: 7.8 G/DL (ref 6.4–8.3)
TRIGLYCERIDE, FASTING: 113 MG/DL
VLDLC SERPL CALC-MCNC: NORMAL MG/DL (ref 1–30)
WBC # BLD: 5.3 K/UL (ref 3.5–11.3)
WBC # BLD: ABNORMAL 10*3/UL

## 2019-09-26 PROCEDURE — 80053 COMPREHEN METABOLIC PANEL: CPT

## 2019-09-26 PROCEDURE — 83036 HEMOGLOBIN GLYCOSYLATED A1C: CPT

## 2019-09-26 PROCEDURE — 80061 LIPID PANEL: CPT

## 2019-09-26 PROCEDURE — 85025 COMPLETE CBC W/AUTO DIFF WBC: CPT

## 2019-09-26 PROCEDURE — 36415 COLL VENOUS BLD VENIPUNCTURE: CPT

## 2019-10-01 DIAGNOSIS — F41.1 GAD (GENERALIZED ANXIETY DISORDER): ICD-10-CM

## 2019-10-01 RX ORDER — HYDROXYZINE HYDROCHLORIDE 25 MG/1
25 TABLET, FILM COATED ORAL 3 TIMES DAILY PRN
Qty: 30 TABLET | Refills: 0 | OUTPATIENT
Start: 2019-10-01

## 2019-10-15 ENCOUNTER — OFFICE VISIT (OUTPATIENT)
Dept: INTERNAL MEDICINE | Age: 39
End: 2019-10-15
Payer: COMMERCIAL

## 2019-10-15 VITALS
SYSTOLIC BLOOD PRESSURE: 133 MMHG | HEART RATE: 107 BPM | BODY MASS INDEX: 24.03 KG/M2 | WEIGHT: 144.2 LBS | HEIGHT: 65 IN | DIASTOLIC BLOOD PRESSURE: 87 MMHG

## 2019-10-15 DIAGNOSIS — K21.9 GASTROESOPHAGEAL REFLUX DISEASE WITHOUT ESOPHAGITIS: ICD-10-CM

## 2019-10-15 DIAGNOSIS — E87.29 HIGH ANION GAP METABOLIC ACIDOSIS: ICD-10-CM

## 2019-10-15 DIAGNOSIS — E83.42 HYPOMAGNESEMIA: Primary | ICD-10-CM

## 2019-10-15 DIAGNOSIS — I10 ESSENTIAL HYPERTENSION: ICD-10-CM

## 2019-10-15 DIAGNOSIS — J01.00 ACUTE NON-RECURRENT MAXILLARY SINUSITIS: ICD-10-CM

## 2019-10-15 DIAGNOSIS — F41.1 GAD (GENERALIZED ANXIETY DISORDER): ICD-10-CM

## 2019-10-15 PROCEDURE — 99211 OFF/OP EST MAY X REQ PHY/QHP: CPT | Performed by: INTERNAL MEDICINE

## 2019-10-15 PROCEDURE — 99213 OFFICE O/P EST LOW 20 MIN: CPT | Performed by: INTERNAL MEDICINE

## 2019-10-15 RX ORDER — LISINOPRIL 10 MG/1
TABLET ORAL
Qty: 30 TABLET | Refills: 11 | Status: SHIPPED | OUTPATIENT
Start: 2019-10-15 | End: 2020-10-06

## 2019-10-15 RX ORDER — HYDROXYZINE HYDROCHLORIDE 25 MG/1
25 TABLET, FILM COATED ORAL 3 TIMES DAILY PRN
Qty: 30 TABLET | Refills: 11 | Status: SHIPPED | OUTPATIENT
Start: 2019-10-15 | End: 2019-11-14

## 2019-10-15 RX ORDER — OMEPRAZOLE 20 MG/1
20 CAPSULE, DELAYED RELEASE ORAL
Qty: 90 CAPSULE | Refills: 1 | Status: SHIPPED | OUTPATIENT
Start: 2019-10-15 | End: 2020-08-17 | Stop reason: SDUPTHER

## 2019-10-15 RX ORDER — FLUTICASONE PROPIONATE 50 MCG
1 SPRAY, SUSPENSION (ML) NASAL DAILY
Qty: 1 BOTTLE | Refills: 3 | Status: SHIPPED | OUTPATIENT
Start: 2019-10-15 | End: 2020-08-17

## 2019-10-15 RX ORDER — CALCIUM CARBONATE/VITAMIN D3 500-10/5ML
1 LIQUID (ML) ORAL NIGHTLY
Qty: 30 CAPSULE | Refills: 11 | Status: SHIPPED | OUTPATIENT
Start: 2019-10-15 | End: 2019-11-14

## 2019-10-15 RX ORDER — BUPROPION HYDROCHLORIDE 300 MG/1
TABLET ORAL
Qty: 30 TABLET | Refills: 11 | Status: SHIPPED | OUTPATIENT
Start: 2019-10-15 | End: 2020-11-12

## 2019-10-15 RX ORDER — PROPRANOLOL HCL 60 MG
CAPSULE, EXTENDED RELEASE 24HR ORAL
Qty: 30 CAPSULE | Refills: 11 | Status: SHIPPED | OUTPATIENT
Start: 2019-10-15 | End: 2020-10-06

## 2019-12-11 ENCOUNTER — HOSPITAL ENCOUNTER (OUTPATIENT)
Age: 39
Setting detail: SPECIMEN
Discharge: HOME | End: 2019-12-11
Payer: COMMERCIAL

## 2019-12-11 ENCOUNTER — OFFICE VISIT (OUTPATIENT)
Dept: OBGYN CLINIC | Age: 39
End: 2019-12-11
Payer: COMMERCIAL

## 2019-12-11 VITALS
BODY MASS INDEX: 22.64 KG/M2 | SYSTOLIC BLOOD PRESSURE: 129 MMHG | HEART RATE: 58 BPM | WEIGHT: 140.9 LBS | DIASTOLIC BLOOD PRESSURE: 83 MMHG | HEIGHT: 66 IN

## 2019-12-11 DIAGNOSIS — Z01.419 WOMEN'S ANNUAL ROUTINE GYNECOLOGICAL EXAMINATION: Primary | ICD-10-CM

## 2019-12-11 DIAGNOSIS — N95.1 PERIMENOPAUSE: ICD-10-CM

## 2019-12-11 DIAGNOSIS — R10.2 PELVIC PAIN: ICD-10-CM

## 2019-12-11 LAB
FOLLICLE STIMULATING HORMONE: 7.7 U/L (ref 1.7–21.5)
LH: 5.4 U/L (ref 1–95.6)
T4 TOTAL: 7.1 UG/DL (ref 4.5–12)
TSH SERPL DL<=0.05 MIU/L-ACNC: 1.44 MIU/L (ref 0.3–5)
VITAMIN D 25-HYDROXY: 58.4 NG/ML (ref 30–100)

## 2019-12-11 PROCEDURE — 99395 PREV VISIT EST AGE 18-39: CPT | Performed by: ADVANCED PRACTICE MIDWIFE

## 2019-12-11 ASSESSMENT — ENCOUNTER SYMPTOMS
GASTROINTESTINAL NEGATIVE: 1
RESPIRATORY NEGATIVE: 1

## 2019-12-11 ASSESSMENT — PATIENT HEALTH QUESTIONNAIRE - PHQ9
2. FEELING DOWN, DEPRESSED OR HOPELESS: 0
SUM OF ALL RESPONSES TO PHQ9 QUESTIONS 1 & 2: 0
SUM OF ALL RESPONSES TO PHQ QUESTIONS 1-9: 0
1. LITTLE INTEREST OR PLEASURE IN DOING THINGS: 0
SUM OF ALL RESPONSES TO PHQ QUESTIONS 1-9: 0

## 2020-01-31 ENCOUNTER — TELEPHONE (OUTPATIENT)
Dept: INTERNAL MEDICINE | Age: 40
End: 2020-01-31

## 2020-01-31 NOTE — LETTER
[address]  [address] [address]  Phone: [phone]  Fax: [phone]    Ermelinda Baxter MD        January 31, 2020    [address] [place] [address] [address] [address]      Dear Jose Argue: We are sending this letter because your PCP ordered [facility] for you to have done at your last visit here and they have not yet been completed. If you can please come to our office on the 2nd floor to  your orders to have them compelted. If you do not have a follow-up appointment scheduled you can either contact the office to make an appointment with us or you can make one when you come in to pick-up your orders. If you have any questions or concerns, please don't hesitate to call.     Sincerely,        Ermelinda Baxter MD

## 2020-02-03 ENCOUNTER — OFFICE VISIT (OUTPATIENT)
Dept: INTERNAL MEDICINE | Age: 40
End: 2020-02-03
Payer: COMMERCIAL

## 2020-02-03 VITALS
HEART RATE: 57 BPM | BODY MASS INDEX: 23.72 KG/M2 | SYSTOLIC BLOOD PRESSURE: 115 MMHG | DIASTOLIC BLOOD PRESSURE: 69 MMHG | WEIGHT: 142.4 LBS | HEIGHT: 65 IN

## 2020-02-03 PROCEDURE — 99215 OFFICE O/P EST HI 40 MIN: CPT | Performed by: INTERNAL MEDICINE

## 2020-02-03 PROCEDURE — 99211 OFF/OP EST MAY X REQ PHY/QHP: CPT | Performed by: INTERNAL MEDICINE

## 2020-02-03 RX ORDER — CITALOPRAM 40 MG/1
40 TABLET ORAL DAILY
Qty: 90 TABLET | Refills: 3 | Status: SHIPPED | OUTPATIENT
Start: 2020-02-03 | End: 2020-02-03

## 2020-02-03 RX ORDER — HYDROXYZINE HYDROCHLORIDE 25 MG/1
25 TABLET, FILM COATED ORAL DAILY
COMMUNITY
Start: 2020-01-23 | End: 2020-08-03

## 2020-02-03 RX ORDER — CITALOPRAM 40 MG/1
40 TABLET ORAL DAILY
Qty: 90 TABLET | Refills: 3 | Status: SHIPPED | OUTPATIENT
Start: 2020-02-03 | End: 2020-02-19

## 2020-02-03 RX ORDER — ASPIRIN 81 MG/1
81 TABLET, CHEWABLE ORAL DAILY
COMMUNITY
Start: 2020-01-02 | End: 2020-02-03

## 2020-02-03 ASSESSMENT — PATIENT HEALTH QUESTIONNAIRE - PHQ9
SUM OF ALL RESPONSES TO PHQ9 QUESTIONS 1 & 2: 0
SUM OF ALL RESPONSES TO PHQ QUESTIONS 1-9: 0
1. LITTLE INTEREST OR PLEASURE IN DOING THINGS: 0
SUM OF ALL RESPONSES TO PHQ QUESTIONS 1-9: 0
2. FEELING DOWN, DEPRESSED OR HOPELESS: 0

## 2020-02-03 NOTE — PROGRESS NOTES
Visit Information    Have you changed or started any medications since your last visit including any over-the-counter medicines, vitamins, or herbal medicines? no   Have you stopped taking any of your medications? Is so, why? -  no  Are you having any side effects from any of your medications? - no    Have you seen any other physician or provider since your last visit? yes - [facility]   Have you had any other diagnostic tests since your last visit? yes - Labs   Have you been seen in the emergency room and/or had an admission in a hospital since we last saw you?  no   Have you had your routine dental cleaning in the past 6 months?  no     Do you have an active MyChart account? If no, what is the barrier?   Yes    Patient Care Team:  Esperanza Apodaca MD as PCP - General (Internal Medicine)  Esperanza Apodaca MD as PCP - [facility]    Medical History Review  Past Medical, Family, and Social History reviewed and does not contribute to the patient presenting condition    Health Maintenance   Topic Date Due    Varicella Vaccine (1 of 2 - 2-dose childhood series) 04/14/1981    Flu vaccine (1) 09/01/2019    Cervical cancer screen  11/27/2019    Potassium monitoring  09/26/2020    Creatinine monitoring  09/26/2020    DTaP/Tdap/Td vaccine (2 - Td) 10/07/2026    HIV screen  Completed    Pneumococcal 0-64 years Vaccine  Aged Out

## 2020-02-03 NOTE — PROGRESS NOTES
Visit Information    Have you changed or started any medications since your last visit including any over-the-counter medicines, vitamins, or herbal medicines? no   Have you stopped taking any of your medications? Is so, why? -  no  Are you having any side effects from any of your medications? - no    Have you seen any other physician or provider since your last visit?  no   Have you had any other diagnostic tests since your last visit?  no   Have you been seen in the emergency room and/or had an admission in a hospital since we last saw you?  no   Have you had your routine dental cleaning in the past 6 months?  no     Do you have an active MyChart account? If no, what is the barrier?   Yes    Patient Care Team:  Bertha Rutledge MD as PCP - General (Internal Medicine)  Bertha Rutledge MD as PCP - [facility]    Medical History Review  Past Medical, Family, and Social History reviewed and does contribute to the patient presenting condition    Health Maintenance   Topic Date Due    Flu vaccine (1) 09/01/2019    Cervical cancer screen  11/27/2019    Potassium monitoring  09/26/2020    Creatinine monitoring  09/26/2020    DTaP/Tdap/Td vaccine (2 - Td) 10/07/2026    HIV screen  Completed    Pneumococcal 0-64 years Vaccine  Aged Out    Varicella Vaccine  Discontinued

## 2020-02-03 NOTE — PROGRESS NOTES
[facility]      Date of Patient's Visit: 2/3/2020    Progress note    Patient Care Team:  Baylee Fernández MD as PCP - General (Internal Medicine)  Baylee Fernández MD as PCP - [address]  Chief Complaint   Patient presents with    Hypertension     3 month f/u   [facility] Maintenance     pt declined vaccination, pt states she make a appt soon for the pap smear       SUBJECTIVE  Alexandr Schroeder is a 44 y.o. female who presents for f/u appointment   She is an ex heroine abuser . She has not done any drugs for the last 3 years now. She was on Wellbutrin to help her with generalized anxiety disorder as well as tobacco addiction. He has quit smoking and feels a lot better. On my exam today she is having a episode of major depression treated with insomnia, easy irritability, tearfulness, thinning of hair admitting functionality. Episode happens every 2 to 3 months and last for at least 2 to 3 weeks. She denies any episodes of vinay    He was recently tested for early perimenopause all the test results for hormone levels were normal.   She has been using CBD oil at night to calm her down but does not think that it helps. She has family history of bipolar disorder    ROS  All other review of systems negative, except for those noted.     Review of Systems    Past Medical History:   Diagnosis Date    Abnormal Pap smear of cervix     Anxiety     Chest pain     pt states had normal stress test    Depression     Drug abuse (HCC)     opiates    Heartburn     HSV-2 infection     Hypertension     Menorrhagia     Osteoarthritis     Pneumonia due to Staphylococcus aureus (HCC)     PTSD (post-traumatic stress disorder)     Trichomonosis 4/18/16    on pap 4/18/16       Past Surgical History:   Procedure Laterality Date    ENDOMETRIAL ABLATION      OTHER SURGICAL HISTORY  06/21/2016    hysteroscopy with Novasure    TUBAL Abdomen is soft. Musculoskeletal: Normal range of motion. General: No swelling. Skin:     General: Skin is warm and dry. Neurological:      General: No focal deficit present. Mental Status: She is alert and oriented to person, place, and time. Psychiatric:      Comments: Crying spells, depressed, irritated         Body mass index is 23.7 kg/m². RESULTS    Lab Findings    CBC:   Lab Results   Component Value Date    WBC 5.3 09/26/2019    HGB 14.7 09/26/2019     09/26/2019     BMP:   Lab Results   Component Value Date     09/26/2019    K 4.4 09/26/2019     09/26/2019    CO2 15 09/26/2019    BUN 6 09/26/2019    CREATININE 0.71 09/26/2019    GLUCOSE 80 09/26/2019     HEMOGLOBIN A1C:   Lab Results   Component Value Date    LABA1C 4.7 09/26/2019     MICROALBUMIN URINE: No results found for: MICROALBUR  FASTING LIPID PANEL:   Lab Results   Component Value Date    CHOL 176 02/05/2018    HDL 50 09/26/2019    TRIG 175 (H) 02/05/2018     Lab Results   Component Value Date    LDLCHOLESTEROL 100 09/26/2019     LIVER PROFILE:   Lab Results   Component Value Date    ALT 17 09/26/2019    AST 20 09/26/2019    PROT 7.8 09/26/2019    BILITOT 0.59 09/26/2019    LABALBU 4.9 09/26/2019      THYROID FUNCTION:   Lab Results   Component Value Date    TSH 1.44 12/11/2019      URINE ANALYSIS: No results found for: [address]    1. Current severe episode of major depressive disorder without psychotic features without prior episode (HCC)    - citalopram (CELEXA) 40 MG tablet; Take 1 tablet by mouth daily  Dispense: 90 tablet; Refill: 3    2. Essential hypertension      3. Narcotic addiction ([address]        plan:     She had successful attempt in quitting tobacco and heroin. She would like to get off of Wellbutrin. Does not think when will Wellbutrin helps with her depression or anxiety anymore.    Had a detailed face-to-face discussion about possible underlying bipolar disorder with

## 2020-02-10 ENCOUNTER — TELEPHONE (OUTPATIENT)
Dept: OBGYN CLINIC | Age: 40
End: 2020-02-10

## 2020-02-19 ENCOUNTER — OFFICE VISIT (OUTPATIENT)
Dept: FAMILY MEDICINE CLINIC | Age: 40
End: 2020-02-19
Payer: COMMERCIAL

## 2020-02-19 VITALS
HEART RATE: 79 BPM | OXYGEN SATURATION: 99 % | BODY MASS INDEX: 22.99 KG/M2 | DIASTOLIC BLOOD PRESSURE: 68 MMHG | HEIGHT: 65 IN | WEIGHT: 138 LBS | SYSTOLIC BLOOD PRESSURE: 108 MMHG | RESPIRATION RATE: 16 BRPM | TEMPERATURE: 99 F

## 2020-02-19 LAB — S PYO AG THROAT QL: POSITIVE

## 2020-02-19 PROCEDURE — 99214 OFFICE O/P EST MOD 30 MIN: CPT | Performed by: NURSE PRACTITIONER

## 2020-02-19 PROCEDURE — 87880 STREP A ASSAY W/OPTIC: CPT | Performed by: NURSE PRACTITIONER

## 2020-02-19 RX ORDER — ASPIRIN 81 MG/1
TABLET, CHEWABLE ORAL
COMMUNITY
Start: 2020-02-10 | End: 2021-12-14 | Stop reason: ALTCHOICE

## 2020-02-19 RX ORDER — PENICILLIN V POTASSIUM 500 MG/1
500 TABLET ORAL 2 TIMES DAILY
Qty: 20 TABLET | Refills: 0 | Status: SHIPPED | OUTPATIENT
Start: 2020-02-19 | End: 2020-02-29

## 2020-02-19 ASSESSMENT — ENCOUNTER SYMPTOMS
VOICE CHANGE: 0
COUGH: 1
EYE REDNESS: 0
CHEST TIGHTNESS: 0
SHORTNESS OF BREATH: 0
EYE DISCHARGE: 0
SINUS PRESSURE: 0
WHEEZING: 0
SORE THROAT: 1

## 2020-02-19 NOTE — PATIENT INSTRUCTIONS
dessert (such as Jell-O) may also soothe the throat. · Get lots of rest.  · Do not smoke, and avoid secondhand smoke. If you need help quitting, talk to your doctor about stop-smoking programs and medicines. These can increase your chances of quitting for good. · Use a vaporizer or humidifier to add moisture to the air in your bedroom. Follow the directions for cleaning the machine. When should you call for help? Call your doctor now or seek immediate medical care if:    · You have a new or higher fever.     · You have a fever with a stiff neck or severe headache.     · You have new or worse trouble swallowing.     · Your sore throat gets much worse on one side.     · Your pain becomes much worse on one side of your throat.    Watch closely for changes in your health, and be sure to contact your doctor if:    · You are not getting better after 2 days (48 hours).     · You do not get better as expected. Where can you learn more? Go to https://[employer]. org and sign in to your [employer] account. Enter K625 in the F[employer] box to learn more about \"Strep Throat: Care Instructions. \"     If you do not have an account, please click on the \"Sign Up Now\" link. Current as of: July 28, 2019  Content Version: 12.3  © 3311-1466 Healthwise, Incorporated. Care instructions adapted under license by [facility] ([facility]). If you have questions about a medical condition or this instruction, always ask your healthcare professional. [place] [address] any warranty or liability for your use of this information.

## 2020-02-19 NOTE — PROGRESS NOTES
[facility]  [address] [place] [address]  Dept: [phone]  Dept Fax: [phone]     Sun Ballard is a 44 y.o. female who presents to the urgent care today for her medicalconditions/complaints as noted below. Sun Ballard is c/o of Generalized Body Aches (started 2 nights ago ) and Pharyngitis    HPI:      Pharyngitis   This is a new problem. Episode onset: 2 days ago. The problem has been unchanged. Associated symptoms include chills, congestion (slightly), coughing (slighty), a fever (102.7 F), headaches, myalgias and a sore throat. Pertinent negatives include no chest pain, fatigue, rash or weakness. The symptoms are aggravated by drinking, eating and swallowing. Treatments tried: theraflu night time, CBD, motrin. The treatment provided mild relief.      Past Medical History:   Diagnosis Date    Abnormal Pap smear of cervix     Anxiety     Chest pain     pt states had normal stress test    Depression     Drug abuse (HCC)     opiates    Heartburn     HSV-2 infection     Hypertension     Menorrhagia     Osteoarthritis     Pneumonia due to Staphylococcus aureus ([facility] [address]     PTSD (post-traumatic stress disorder)     Trichomonosis 4/18/16    on pap 4/18/16      Current Outpatient Medications   Medication Sig Dispense Refill    penicillin v potassium (VEETID) 500 MG tablet Take 1 tablet by mouth 2 times daily for 10 days 20 tablet 0    aspirin 81 MG chewable tablet       hydrOXYzine (ATARAX) 25 MG tablet Take 25 mg by mouth daily      magnesium oxide (MAG-OX) 400 (241.3 Mg) MG TABS tablet Take 400 mg by mouth daily      buPROPion (WELLBUTRIN XL) 300 MG extended release tablet TAKE ONE TABLET BY MOUTH EVERY MORNING 30 tablet 11    propranolol (INDERAL LA) 60 MG extended release capsule TAKE ONE CAPSULE BY MOUTH DAILY 30 capsule 11    lisinopril (PRINIVIL;ZESTRIL) 10 MG tablet TAKE ONE TABLET BY MOUTH DAILY 30 tablet 11    2+ on the right. 2+ on the left. Eyes:      General:         Right eye: No discharge. Left eye: No discharge. Cardiovascular:      Rate and Rhythm: Normal rate and regular rhythm. Heart sounds: Normal heart sounds. No murmur. Pulmonary:      Effort: Pulmonary effort is normal. No respiratory distress. Breath sounds: Normal breath sounds. No wheezing or rales. Lymphadenopathy:      Cervical: Cervical adenopathy present. Skin:     General: Skin is warm. Findings: No rash. Neurological:      Mental Status: She is alert. /68 (Site: Right Upper Arm, Position: Sitting, Cuff Size: Medium Adult)   Pulse 79   Temp 99 °F (37.2 °C) (Tympanic)   Resp 16   Ht 5' 5\" (1.651 m)   Wt 138 lb (62.6 kg)   SpO2 99%   BMI 22.96 kg/m²     Results for orders placed or performed in visit on 02/19/20   POCT rapid strep A   Result Value Ref Range    Strep A Ag Positive (A) None Detected     Assessment:       Diagnosis Orders   1. Acute streptococcal pharyngitis  penicillin v potassium (VEETID) 500 MG tablet   2. Sore throat  POCT rapid strep A     Plan:      Patient instructed to complete entire antibiotic course. Tylenol/Motrin as needed for fever/discomfort. Change toothbrush in 24 hours. Salt water gargles and throat lozenges if desired. Patient agreeable to treatment plan. Educational materials provided on AVS.  Follow up if symptoms do not improve/worsen. Orders Placed This Encounter   Medications    penicillin v potassium (VEETID) 500 MG tablet     Sig: Take 1 tablet by mouth 2 times daily for 10 days     Dispense:  20 tablet     Refill:  0        Patient given educational materials - see patient instructions. Discussed use, benefit, and side effects of prescribed medications. All patientquestions answered. Pt voiced understanding.     Electronically signed by TG Trujillo CNP on 2/19/2020at 10:33 AM

## 2020-03-20 ENCOUNTER — TELEPHONE (OUTPATIENT)
Dept: INTERNAL MEDICINE | Age: 40
End: 2020-03-20

## 2020-03-20 RX ORDER — FLUTICASONE PROPIONATE 50 MCG
1 SPRAY, SUSPENSION (ML) NASAL DAILY
Qty: 1 BOTTLE | Refills: 3 | Status: CANCELLED | OUTPATIENT
Start: 2020-03-20 | End: 2020-03-25

## 2020-03-20 RX ORDER — FLUTICASONE PROPIONATE 50 MCG
1 SPRAY, SUSPENSION (ML) NASAL DAILY
Qty: 2 BOTTLE | Refills: 1 | Status: SHIPPED | OUTPATIENT
Start: 2020-03-20 | End: 2020-10-29

## 2020-05-08 ENCOUNTER — TELEPHONE (OUTPATIENT)
Dept: INTERNAL MEDICINE | Age: 40
End: 2020-05-08

## 2020-08-03 RX ORDER — HYDROXYZINE HYDROCHLORIDE 25 MG/1
TABLET, FILM COATED ORAL
Qty: 30 TABLET | Refills: 10 | Status: SHIPPED | OUTPATIENT
Start: 2020-08-03 | End: 2021-06-28

## 2020-08-17 ENCOUNTER — VIRTUAL VISIT (OUTPATIENT)
Dept: INTERNAL MEDICINE | Age: 40
End: 2020-08-17
Payer: COMMERCIAL

## 2020-08-17 PROCEDURE — 99443 PR PHYS/QHP TELEPHONE EVALUATION 21-30 MIN: CPT | Performed by: INTERNAL MEDICINE

## 2020-08-17 RX ORDER — CITALOPRAM 20 MG/1
20 TABLET ORAL DAILY
Qty: 30 TABLET | Refills: 3 | Status: SHIPPED | OUTPATIENT
Start: 2020-08-17 | End: 2020-12-16

## 2020-08-17 RX ORDER — OLANZAPINE 2.5 MG/1
2.5 TABLET ORAL NIGHTLY
Qty: 30 TABLET | Refills: 3 | Status: SHIPPED | OUTPATIENT
Start: 2020-08-17 | End: 2020-12-16

## 2020-08-17 RX ORDER — OMEPRAZOLE 20 MG/1
20 CAPSULE, DELAYED RELEASE ORAL
Qty: 90 CAPSULE | Refills: 1 | Status: SHIPPED | OUTPATIENT
Start: 2020-08-17 | End: 2021-04-05 | Stop reason: SDUPTHER

## 2020-08-17 ASSESSMENT — ENCOUNTER SYMPTOMS
RESPIRATORY NEGATIVE: 1
GASTROINTESTINAL NEGATIVE: 1

## 2020-08-17 NOTE — PATIENT INSTRUCTIONS
Medications e-scribe to pharmacy of pt's choice. Referral to Psychiatry given to pt along with some locations, pt is to call and make appt at place of choice. Labs mailed to pt. An After Visit Summary was printed and mailed to the patient.

## 2020-08-17 NOTE — PROGRESS NOTES
2020    TELEHEALTH EVALUATION -- Audio(During IYSNX-90 public health emergency)    Patient is evaluated via telephone on 2020. Consent:  The patient and/or health care decision maker is aware that that he may receive a bill for this telephone service, depending on his insurance coverage, and has provided verbal consent to proceed: Yes    HPI:    Susanne Olson (:  1980) has requested an audio evaluation for the following concern(s):    Uncontrolled mood disorder, hair thinning, concerns over the medication    She was asked to see OBGYN for hot flashes and amenorrhea to rule out a premature ovarian failure which was evaluated as negative and that she is not menopausal   She continues to have night sweats and hot flashes     For her mood disorder , which is described as easy irritability , insomnia. Difficult to focus , train of thoughts , pressured speech and paranoia. Her daughter has h/o paranoid hallucinations   She wanted to d/c wellbutyrin in the last visit and she was prescribed celexa on account of hot flashes and depression. However she never tried to wean off wellbutyrine or tried celexa as she read side effects on internet. Review of Systems   Constitutional: Positive for activity change. HENT: Negative. Respiratory: Negative. Gastrointestinal: Negative. Genitourinary: Negative. Musculoskeletal: Negative. Psychiatric/Behavioral: Positive for agitation, confusion, dysphoric mood, hallucinations and sleep disturbance. Negative for suicidal ideas. The patient is nervous/anxious. Prior to Visit Medications    Medication Sig Taking?  Authorizing Provider   magnesium oxide (MAG-OX) 400 (241.3 Mg) MG TABS tablet Take 1 tablet by mouth daily Yes Tawnya Olson MD   omeprazole (PRILOSEC) 20 MG delayed release capsule Take 1 capsule by mouth daily (with breakfast) Yes Asia Amaya MD   OLANZapine (ZYPREXA) 2.5 MG tablet Take 1 tablet by mouth nightly Yes Asia MD Monique   citalopram (CELEXA) 20 MG tablet Take 1 tablet by mouth daily Yes Stacie Randhawa MD   hydrOXYzine (ATARAX) 25 MG tablet TAKE ONE TABLET BY MOUTH THREE TIMES A DAY AS NEEDED FOR ITCHING Yes Stacie Randhawa MD   fluticasone (FLONASE) 50 MCG/ACT nasal spray 1 spray by Each Nostril route daily Yes Stacie Randhawa MD   buPROPion (WELLBUTRIN XL) 300 MG extended release tablet TAKE ONE TABLET BY MOUTH EVERY MORNING Yes Stacie Randhawa MD   propranolol (INDERAL LA) 60 MG extended release capsule TAKE ONE CAPSULE BY MOUTH DAILY Yes Stacie Randhawa MD   lisinopril (PRINIVIL;ZESTRIL) 10 MG tablet TAKE ONE TABLET BY MOUTH DAILY Yes Stacie Randhawa MD   aspirin 81 MG chewable tablet   Historical Provider, MD       Social History     Tobacco Use    Smoking status: Former Smoker     Packs/day: 0.50     Years: 18.00     Pack years: 9.00     Types: Cigarettes    Smokeless tobacco: Never Used   Substance Use Topics    Alcohol use: Yes     Alcohol/week: 0.0 standard drinks     Comment:  3 x yearly    Drug use: No     Types: IV     Comment: recovery x 6.5 years            RECORD REVIEW: Previous medical records were reviewed at today's visit. PHYSICAL EXAMINATION:    Vital Signs: (As obtained by patient/caregiver at home)      Constitutional: [x] Appears well-developed and well-nourished [] No apparent distress      [] Abnormal   Mental status  [x] Alert and awake  [] Oriented to person/place/time []Able to follow commands        Pulmonary/Chest: [] Respiratory effort normal.  [] No visualized signs of difficulty breathing or respiratory distress        [] Abnormal              Psychiatric:       [] Normal [x] Abnormal        [] No Hallucinations    Other pertinent observable physical exam findings:-          RECORD REVIEW: Previous medical records were reviewed at today's visit.      The past family, medical and social histories were reviewed and unchanged with the exceptions of what is mentioned in this note.    Due to this being a TeleHealth encounter, evaluation of the following organ systems is limited: Vitals/Constitutional/EENT/Resp/CV/GI//MS/Neuro/Skin/Heme-Lymph-Imm. ASSESSMENT/PLAN:  1. Gastroesophageal reflux disease without esophagitis    - omeprazole (PRILOSEC) 20 MG delayed release capsule; Take 1 capsule by mouth daily (with breakfast)  Dispense: 90 capsule; Refill: 1    2. Bipolar affective disorder, current episode hypomanic (HCC)    - OLANZapine (ZYPREXA) 2.5 MG tablet; Take 1 tablet by mouth nightly  Dispense: 30 tablet; Refill: 3  - citalopram (CELEXA) 20 MG tablet; Take 1 tablet by mouth daily  Dispense: 30 tablet; Refill: 3  - [facility]    3. Iron deficiency anemia due to dietary causes    - Iron And TIBC; Future    4. Secondary amenorrhea      5. Hot flashes  celexa     Plan ;    Started anti psychotic given her features on detailed psych interview   celexa for vasomotor symptoms as well as depression   She is requested to wean off wellbutyrin over next 2-3 weeks . Biotic for hair and skin   tsh was normal   Will check iron levels to evaluate hair thinning and excessive falling     Total Time spent 25 min     No follow-ups on file. An  electronic signature was used to authenticate this note. --Julio César Mcleod MD on 8/17/2020 at 12:15 PM    9}    Pursuant to the emergency declaration under the [facility] waiver authority and the [employer] Act, this Virtual  Visit was conducted, with patient's consent, to reduce the patient's risk of exposure to COVID-19 and provide continuity of care for an established patient. Services were provided through a telephone discussion virtually to substitute for in-person clinic visit.

## 2020-10-06 RX ORDER — PROPRANOLOL HCL 60 MG
CAPSULE, EXTENDED RELEASE 24HR ORAL
Qty: 30 CAPSULE | Refills: 10 | Status: SHIPPED | OUTPATIENT
Start: 2020-10-06 | End: 2021-08-31

## 2020-10-06 RX ORDER — LISINOPRIL 10 MG/1
TABLET ORAL
Qty: 30 TABLET | Refills: 10 | Status: SHIPPED | OUTPATIENT
Start: 2020-10-06 | End: 2021-08-31

## 2020-10-06 NOTE — TELEPHONE ENCOUNTER
Request for lisinopril, propranolol - meds pended. Please fill if appropriate. Next Visit Date:  No future appointments.     Health Maintenance   Topic Date Due    Cervical cancer screen  11/27/2019    Flu vaccine (1) 09/01/2020    Potassium monitoring  09/26/2020    Creatinine monitoring  09/26/2020    Lipid screen  09/26/2024    DTaP/Tdap/Td vaccine (2 - Td) 10/07/2026    HIV screen  Completed    Hepatitis A vaccine  Aged Out    Hepatitis B vaccine  Aged Out    Hib vaccine  Aged Out    Meningococcal (ACWY) vaccine  Aged Out    Pneumococcal 0-64 years Vaccine  Aged Out    Varicella vaccine  Discontinued       Hemoglobin A1C (%)   Date Value   09/26/2019 4.7   02/05/2018 4.4   03/30/2016 5.2             ( goal A1C is < 7)   No results found for: LABMICR  LDL Cholesterol (mg/dL)   Date Value   09/26/2019 100       (goal LDL is <100)   AST (U/L)   Date Value   09/26/2019 20     ALT (U/L)   Date Value   09/26/2019 17     BUN (mg/dL)   Date Value   09/26/2019 6     BP Readings from Last 3 Encounters:   02/19/20 108/68   02/03/20 115/69   12/11/19 129/83          (goal 120/80)    All Future Testing planned in [facility]  Lab Frequency Next Occurrence   Basic Metabolic Panel Once 44/41/7129   Iron And TIBC Once 10/25/2020         Patient Active Problem List:     Heavy menstrual bleeding     HUE (generalized anxiety disorder)     Narcotic addiction ([address]     Panic disorder     Cigarette nicotine dependence without complication     History of heroin abuse ([address]     Essential hypertension

## 2020-10-08 ENCOUNTER — HOSPITAL ENCOUNTER (OUTPATIENT)
Age: 40
Setting detail: SPECIMEN
Discharge: HOME | End: 2020-10-08
Payer: COMMERCIAL

## 2020-10-08 LAB
ANION GAP SERPL CALCULATED.3IONS-SCNC: 13 MMOL/L (ref 9–17)
BUN BLDV-MCNC: 16 MG/DL (ref 6–20)
BUN/CREAT BLD: NORMAL (ref 9–20)
CALCIUM SERPL-MCNC: 9.5 MG/DL (ref 8.6–10.4)
CHLORIDE BLD-SCNC: 101 MMOL/L (ref 98–107)
CO2: 23 MMOL/L (ref 20–31)
CREAT SERPL-MCNC: 0.66 MG/DL (ref 0.5–0.9)
GFR AFRICAN AMERICAN: >60 ML/MIN
GFR NON-AFRICAN AMERICAN: >60 ML/MIN
GFR SERPL CREATININE-BSD FRML MDRD: NORMAL ML/MIN/{1.73_M2}
GFR SERPL CREATININE-BSD FRML MDRD: NORMAL ML/MIN/{1.73_M2}
GLUCOSE BLD-MCNC: 84 MG/DL (ref 70–99)
IRON SATURATION: 40 % (ref 20–55)
IRON: 146 UG/DL (ref 37–145)
POTASSIUM SERPL-SCNC: 4.4 MMOL/L (ref 3.7–5.3)
SODIUM BLD-SCNC: 137 MMOL/L (ref 135–144)
TOTAL IRON BINDING CAPACITY: 364 UG/DL (ref 250–450)
UNSATURATED IRON BINDING CAPACITY: 218 UG/DL (ref 112–347)

## 2020-10-27 NOTE — TELEPHONE ENCOUNTER
Request for flonase - med pended. Please fill if appropriate.       Next Visit Date:  Future Appointments   Date Time Provider Paul Daniels   12/16/2020 10:00 AM [facility] [address] Maintenance   Topic Date Due    Cervical cancer screen  11/27/2019    Flu vaccine (1) 09/01/2020    Potassium monitoring  10/08/2021    Creatinine monitoring  10/08/2021    Lipid screen  09/26/2024    DTaP/Tdap/Td vaccine (2 - Td) 10/07/2026    HIV screen  Completed    Hepatitis A vaccine  Aged Out    Hepatitis B vaccine  Aged Out    Hib vaccine  Aged Out    Meningococcal (ACWY) vaccine  Aged Out    Pneumococcal 0-64 years Vaccine  Aged Out    Varicella vaccine  Discontinued       Hemoglobin A1C (%)   Date Value   09/26/2019 4.7   02/05/2018 4.4   03/30/2016 5.2             ( goal A1C is < 7)   No results found for: LABMICR  LDL Cholesterol (mg/dL)   Date Value   09/26/2019 100       (goal LDL is <100)   AST (U/L)   Date Value   09/26/2019 20     ALT (U/L)   Date Value   09/26/2019 17     BUN (mg/dL)   Date Value   10/08/2020 16     BP Readings from Last 3 Encounters:   02/19/20 108/68   02/03/20 115/69   12/11/19 129/83          (goal 120/80)    All Future Testing planned in [facility]  Lab Frequency Next Occurrence         Patient Active Problem List:     Heavy menstrual bleeding     HUE (generalized anxiety disorder)     Narcotic addiction ([address]     Panic disorder     Cigarette nicotine dependence without complication     History of heroin abuse ([address]     Essential hypertension

## 2020-10-29 RX ORDER — FLUTICASONE PROPIONATE 50 MCG
SPRAY, SUSPENSION (ML) NASAL
Qty: 1 BOTTLE | Refills: 0 | Status: SHIPPED | OUTPATIENT
Start: 2020-10-29 | End: 2021-04-05 | Stop reason: SDUPTHER

## 2020-11-12 RX ORDER — BUPROPION HYDROCHLORIDE 300 MG/1
TABLET ORAL
Qty: 30 TABLET | Refills: 10 | Status: SHIPPED | OUTPATIENT
Start: 2020-11-12 | End: 2021-10-07

## 2020-11-12 NOTE — TELEPHONE ENCOUNTER
Request for wellbutrin.       Next Visit Date:  Future Appointments   Date Time Provider Paul Daniels   12/16/2020 10:00 AM TG Moncada CNM [facility] [address] Maintenance   Topic Date Due    Cervical cancer screen  11/27/2019    Flu vaccine (1) 09/01/2020    Potassium monitoring  10/08/2021    Creatinine monitoring  10/08/2021    Lipid screen  09/26/2024    DTaP/Tdap/Td vaccine (2 - Td) 10/07/2026    HIV screen  Completed    Hepatitis A vaccine  Aged Out    Hepatitis B vaccine  Aged Out    Hib vaccine  Aged Out    Meningococcal (ACWY) vaccine  Aged Out    Pneumococcal 0-64 years Vaccine  Aged Out    Varicella vaccine  Discontinued       Hemoglobin A1C (%)   Date Value   09/26/2019 4.7   02/05/2018 4.4   03/30/2016 5.2             ( goal A1C is < 7)   No results found for: LABMICR  LDL Cholesterol (mg/dL)   Date Value   09/26/2019 100       (goal LDL is <100)   AST (U/L)   Date Value   09/26/2019 20     ALT (U/L)   Date Value   09/26/2019 17     BUN (mg/dL)   Date Value   10/08/2020 16     BP Readings from Last 3 Encounters:   02/19/20 108/68   02/03/20 115/69   12/11/19 129/83          (goal 120/80)    All Future Testing planned in [facility]  Lab Frequency Next Occurrence         Patient Active Problem List:     Heavy menstrual bleeding     HUE (generalized anxiety disorder)     Narcotic addiction ([address]     Panic disorder     Cigarette nicotine dependence without complication     History of heroin abuse ([address]     Essential hypertension

## 2020-12-16 ENCOUNTER — OFFICE VISIT (OUTPATIENT)
Dept: OBGYN CLINIC | Age: 40
End: 2020-12-16
Payer: COMMERCIAL

## 2020-12-16 VITALS
DIASTOLIC BLOOD PRESSURE: 88 MMHG | HEIGHT: 65 IN | HEART RATE: 63 BPM | SYSTOLIC BLOOD PRESSURE: 122 MMHG | WEIGHT: 150.9 LBS | BODY MASS INDEX: 25.14 KG/M2 | TEMPERATURE: 97 F

## 2020-12-16 PROCEDURE — 99396 PREV VISIT EST AGE 40-64: CPT | Performed by: ADVANCED PRACTICE MIDWIFE

## 2020-12-16 SDOH — ECONOMIC STABILITY: INCOME INSECURITY: HOW HARD IS IT FOR YOU TO PAY FOR THE VERY BASICS LIKE FOOD, HOUSING, MEDICAL CARE, AND HEATING?: NOT HARD AT ALL

## 2020-12-16 SDOH — ECONOMIC STABILITY: FOOD INSECURITY: WITHIN THE PAST 12 MONTHS, YOU WORRIED THAT YOUR FOOD WOULD RUN OUT BEFORE YOU GOT MONEY TO BUY MORE.: NEVER TRUE

## 2020-12-16 SDOH — SOCIAL STABILITY: SOCIAL NETWORK: ARE YOU MARRIED, WIDOWED, DIVORCED, SEPARATED, NEVER MARRIED, OR LIVING WITH A PARTNER?: NOT ASKED

## 2020-12-16 SDOH — ECONOMIC STABILITY: FOOD INSECURITY: WITHIN THE PAST 12 MONTHS, THE FOOD YOU BOUGHT JUST DIDN'T LAST AND YOU DIDN'T HAVE MONEY TO GET MORE.: NEVER TRUE

## 2020-12-16 SDOH — SOCIAL STABILITY: SOCIAL NETWORK: IN A TYPICAL WEEK, HOW MANY TIMES DO YOU TALK ON THE PHONE WITH FAMILY, FRIENDS, OR NEIGHBORS?: NOT ASKED

## 2020-12-16 SDOH — SOCIAL STABILITY: SOCIAL INSECURITY: WITHIN THE LAST YEAR, HAVE YOU BEEN HUMILIATED OR EMOTIONALLY ABUSED IN OTHER WAYS BY YOUR PARTNER OR EX-PARTNER?: NO

## 2020-12-16 SDOH — SOCIAL STABILITY: SOCIAL INSECURITY
WITHIN THE LAST YEAR, HAVE TO BEEN RAPED OR FORCED TO HAVE ANY KIND OF SEXUAL ACTIVITY BY YOUR PARTNER OR EX-PARTNER?: NO

## 2020-12-16 SDOH — SOCIAL STABILITY: SOCIAL NETWORK: HOW OFTEN DO YOU ATTEND CHURCH OR RELIGIOUS SERVICES?: NOT ASKED

## 2020-12-16 SDOH — SOCIAL STABILITY: SOCIAL NETWORK
DO YOU BELONG TO ANY CLUBS OR ORGANIZATIONS SUCH AS CHURCH GROUPS UNIONS, FRATERNAL OR ATHLETIC GROUPS, OR SCHOOL GROUPS?: NOT ASKED

## 2020-12-16 SDOH — SOCIAL STABILITY: SOCIAL INSECURITY
WITHIN THE LAST YEAR, HAVE YOU BEEN KICKED, HIT, SLAPPED, OR OTHERWISE PHYSICALLY HURT BY YOUR PARTNER OR EX-PARTNER?: NO

## 2020-12-16 SDOH — SOCIAL STABILITY: SOCIAL NETWORK: HOW OFTEN DO YOU GET TOGETHER WITH FRIENDS OR RELATIVES?: NOT ASKED

## 2020-12-16 SDOH — SOCIAL STABILITY: SOCIAL NETWORK: HOW OFTEN DO YOU ATTENT MEETINGS OF THE CLUB OR ORGANIZATION YOU BELONG TO?: NOT ASKED

## 2020-12-16 SDOH — ECONOMIC STABILITY: TRANSPORTATION INSECURITY
IN THE PAST 12 MONTHS, HAS LACK OF TRANSPORTATION KEPT YOU FROM MEETINGS, WORK, OR FROM GETTING THINGS NEEDED FOR DAILY LIVING?: NO

## 2020-12-16 SDOH — SOCIAL STABILITY: SOCIAL INSECURITY: WITHIN THE LAST YEAR, HAVE YOU BEEN AFRAID OF YOUR PARTNER OR EX-PARTNER?: NO

## 2020-12-16 SDOH — ECONOMIC STABILITY: TRANSPORTATION INSECURITY
IN THE PAST 12 MONTHS, HAS THE LACK OF TRANSPORTATION KEPT YOU FROM MEDICAL APPOINTMENTS OR FROM GETTING MEDICATIONS?: NO

## 2020-12-16 ASSESSMENT — ENCOUNTER SYMPTOMS
NAUSEA: 1
RESPIRATORY NEGATIVE: 1

## 2020-12-16 NOTE — PROGRESS NOTES
Date of Visit: 12/16/2020    SUBJECTIVE:  Chief Complaint   Patient presents with    Gynecologic Exam     last pap 11/27/18 wnl       HPI  Chris Graff arrives for annual Well Woman exam.  Patient has some concerns today. She continues with perimenopausal symptoms, mostly emotional in nature, feels \"short. \" Has been prescribed Wellbutrin  She reports experiencing waves of nausea at times     Her No LMP recorded. Patient has had an ablation. [place] Her bowel habits are regular. She denies any bloating. Nauseous waves  She denies dysuria. She denies urinary leaking. She denies vaginal discharge. She is sexually active     Depression/anxiety screening (in Epic): Positive screen, started on Wellbutrin  Intimate Partner Screening HITS:  negative    Review of Systems   Constitutional: Negative. HENT: Negative. Respiratory: Negative. Cardiovascular: Negative. Gastrointestinal: Positive for nausea. Genitourinary: Negative. Musculoskeletal: Negative. Skin: Negative. Neurological: Negative. Psychiatric/Behavioral: The patient is nervous/anxious. PREVENTIVE HEALTH SCREENING:   Date of last pap:  11/2018 normal              HPV typing/date: ? Date of last mammogram: NA  Date of last DEXA scan: NA  Date of last colonoscopy: NA    History of Gestational Diabetes: No     History of Pre-Eclampsia: No    Preventive screening through PCP: Yes    Family history of Breast, Ovarian, Colon or Uterine Cancer:  No     See updated review in Media     Genetic counseling:  Done      GYNECOLOGIC HISTORY:  Menarche: teens    STD history: HSV    Physical Exam:     Constitutional:   Blood pressure 122/88, pulse 63, temperature 97 °F (36.1 °C), height 5' 5\" (1.651 m), weight 150 lb 14.4 oz (68.4 kg), not currently breastfeeding. Wt Readings from Last 3 Encounters:   12/16/20 150 lb 14.4 oz (68.4 kg)   02/19/20 138 lb (62.6 kg)   02/03/20 142 lb 6.4 oz (64.6 kg)       General Appearance:   This is a well-developed, well-nourished and well-groomed female. Alert and not in distress  Skin:  Inspection of the skin revealed no rashes or lesions  Neck and EENT:  No eye discharge and sclera non-icteric  Lips, teeth and gums without lesions and normal dentition  Nares are patent without discharge  Normal external ears are present with no hearing loss  The neck was supple with full range of motion and no masses. The thyroid was not enlarged and had no masses. No enlarged cervical lymph nodes  Lymphatic:   No lymph nodes were palpable in neck, axilla or groin   Neurologic:    Normal speech, no focal findings or movement disorder noted  Respiratory: The lungs were clear to auscultation bilaterally. There were no rales, rhonchi or wheezes. There was good respiratory effort. Cardiovascular: The heart was in a regular rhythm and rate was normal.  No murmur or extra sounds were noted. Breast:  The breasts are normal size and symmetrical.  There are no skin changes with position changes. The nipples are without deviations or discharge. No masses were palpated. No axillary or supraclavicular lymphadenopathy is present. Back:  Straight with no CVA tenderness present  Abdomen: The abdomen is soft and non-tender. There was no guarding, rebound or rigidity. The bladder was without fullness or tenderness. No hernias were appreciated. Pelvic: The external genitalia has a normal appearance without masses or lesions. There is no inguinal lymphadenopathy. Bladder/urethra without discharge or mass. Normal urethra. The vagina is pink and well rugated. The cervix is without discharge and with no CMT. There are Nabothian cysts along with ectopion present on the cervix. Pap was obtained without difficulty. The uterus is midline, mobile, normal size/shape and non-tender. The adnexa are without masses or tenderness. Rectum is normal.  Musculoskeletal:   Normal gait. No contractions with normal movement of all extremities. Range of motion appropriate for patient's age. Extremities:  No cyanosis or edema present. No calf tenderness  Neurologic:    Normal speech, no focal findings or movement disorder noted  Psychiatric:  Alert, oriented to time, place, person and situation. There are no mood or affect changes. ASSESSMENT/PLAN:  1. Encounter for well woman exam with routine gynecological exam  AGE>40 Counseling and Evaluation  Sexuality/Reproductive Planning  [] Discussed birth control as needed and reviewed ACHES; refills given   [] Reproductive plan discussed (as appropriate)  [x] Sexual function  [] Discussed STI counseling/prevention  Fitness/Nutrition  [x] Physical activity  [] Folic Acid supplementation discussed  [x] Calcium (split dose) supplementation  Health/Risk Assessment  [x] Discussed annual Well-Woman exams every year; Pap per ASCCP guidelines and testing: Pap with HPV today  [x] Discussed mammogram screening (ages 39/51) per current recommendations (if no abnormalities or family history; breast self-awareness reinforced. Desired to start baseline screen  [] Discussed colonoscopy screening (age 48)  [] Discussed DEXA screening at age 72 )if no fracture history or Osteoporosis family history  [] Reinforced Calcium (split dose)/Vitamin D supplementation  [x] Hereditary Breast/Ovarian Cancer screening  [x] Hepatitis C screening: Done previously and negative  [] Immunizations:  [] Personal history of Pre-Eclampsia or GDM  [x] Tobacco & Secondary smoke risks: Former smoker  [x] Health maintenance through PCP  Psychosocial Evaluation  [x] Intimate partner violence screening: Negative  [x] Depression/Anxiety screening: Positive and started on SSRI  [x] Lifestyle/stress:  Cardiovascular Risk Factors  [] Family history  [] Hypertension  [] Dyslipidemia  [] Obesity  [] Diabetes Mellitus  [] Personal hx of PreE, GDM, or PIH  [] Lifestyle  - PAP SMEAR; Future  - [facility] DIGITAL SCREEN W OR WO CAD BILATERAL; Future    2. Perimenopausal symptoms  Discussed ebb/flow of hormones; started on Wellbutrin. Desires to not take medication if at all possible, desires CAM and uses supplements as needed    3. Encounter for screening mammogram for breast cancer  - [facility] DIGITAL SCREEN W OR WO CAD BILATERAL; Future      Patient was seen with total face to face time of 20 minutes. More than 50% of this visit was on counseling and education regardingher diagnoses and her options. She was also counseled on her preventative health maintenance recommendations and follow-up.     Electronically Signed by TG Naidu CNM

## 2020-12-22 LAB — PAP SMEAR: NORMAL

## 2021-01-04 NOTE — TELEPHONE ENCOUNTER
E-scribe request for medication magnesium . Pt is on wait list till Feb.     Health Maintenance   Topic Date Due    Flu vaccine (1) 09/01/2020    Potassium monitoring  10/08/2021    Creatinine monitoring  10/08/2021    Cervical cancer screen  12/18/2021    Lipid screen  09/26/2024    DTaP/Tdap/Td vaccine (2 - Td) 10/07/2026    Hepatitis C screen  Completed    HIV screen  Completed    Hepatitis A vaccine  Aged Out    Hepatitis B vaccine  Aged Out    Hib vaccine  Aged Out    Meningococcal (ACWY) vaccine  Aged Out    Pneumococcal 0-64 years Vaccine  Aged Out    Varicella vaccine  Discontinued             (applicable per patient's age: Cancer Screenings, Depression Screening, Fall Risk Screening, Immunizations)    Hemoglobin A1C (%)   Date Value   09/26/2019 4.7   02/05/2018 4.4   03/30/2016 5.2     LDL Cholesterol (mg/dL)   Date Value   09/26/2019 100     AST (U/L)   Date Value   09/26/2019 20     ALT (U/L)   Date Value   09/26/2019 17     BUN (mg/dL)   Date Value   10/08/2020 16      (goal A1C is < 7)   (goal LDL is <100) need 30-50% reduction from baseline     BP Readings from Last 3 Encounters:   12/16/20 122/88   02/19/20 108/68   02/03/20 115/69    (goal /80)      All Future Testing planned in [facility]:  Lab Frequency Next Occurrence   SERGIO DIGITAL SCREEN W OR WO CAD BILATERAL Once 12/29/2020       Next Visit Date:  No future appointments.          Patient Active Problem List:     HUE (generalized anxiety disorder)     Panic disorder     History of heroin abuse ([facility] [address]     Essential hypertension

## 2021-02-09 ENCOUNTER — HOSPITAL ENCOUNTER (OUTPATIENT)
Dept: MAMMOGRAPHY | Facility: CLINIC | Age: 41
Discharge: HOME | End: 2021-02-11
Payer: COMMERCIAL

## 2021-02-09 ENCOUNTER — TELEPHONE (OUTPATIENT)
Dept: OBGYN CLINIC | Age: 41
End: 2021-02-09

## 2021-02-09 DIAGNOSIS — Z12.31 ENCOUNTER FOR SCREENING MAMMOGRAM FOR BREAST CANCER: ICD-10-CM

## 2021-02-09 DIAGNOSIS — R92.8 ABNORMAL MAMMOGRAM OF LEFT BREAST: Primary | ICD-10-CM

## 2021-02-09 DIAGNOSIS — Z01.419 ENCOUNTER FOR WELL WOMAN EXAM WITH ROUTINE GYNECOLOGICAL EXAM: ICD-10-CM

## 2021-02-09 PROCEDURE — 77067 SCR MAMMO BI INCL CAD: CPT

## 2021-02-09 NOTE — TELEPHONE ENCOUNTER
Unable to leave message as mailbox is full  Needs follow up mammogram on left side for asymmetry, no other suspicious findings.   I will order US also in case it is needed

## 2021-02-19 ENCOUNTER — TELEPHONE (OUTPATIENT)
Dept: INTERNAL MEDICINE | Age: 41
End: 2021-02-19

## 2021-02-19 DIAGNOSIS — N30.00 ACUTE CYSTITIS WITHOUT HEMATURIA: Primary | ICD-10-CM

## 2021-02-19 RX ORDER — CEPHALEXIN 500 MG/1
500 CAPSULE ORAL 4 TIMES DAILY
Qty: 14 CAPSULE | Refills: 0 | Status: SHIPPED | OUTPATIENT
Start: 2021-02-19 | End: 2021-02-26

## 2021-02-19 NOTE — TELEPHONE ENCOUNTER
URINARY    Patient calling stating she has a UTI that started yesterday afternoon. Patient states that she has been drinking water and cranberry juice which has helped some but afraid that it will get worse over the weekend. Writer tried to make an appt but patient stated there is no way she can do an appt today. Patient calling with symptoms of possible urinary tract infection  Symptoms include dysuria, frequency, burning with urination, pain in in the left flank  Symptoms have persisted for 1 day  Patient is also complaining of nothing else  When was their last UTI 2016    *This condition is eligible for an eVisit. If not already active, sign patient up for MyChart to improve access and communication with the provider. *      Health Maintenance   Topic Date Due    Flu vaccine (1) 09/01/2020    Potassium monitoring  10/08/2021    Creatinine monitoring  10/08/2021    Cervical cancer screen  12/18/2021    Lipid screen  09/26/2024    DTaP/Tdap/Td vaccine (2 - Td) 10/07/2026    Hepatitis C screen  Completed    HIV screen  Completed    Hepatitis A vaccine  Aged Out    Hepatitis B vaccine  Aged Out    Hib vaccine  Aged Out    Meningococcal (ACWY) vaccine  Aged Out    Pneumococcal 0-64 years Vaccine  Aged Out    Varicella vaccine  Discontinued             (applicable per patient's age: Cancer Screenings, Depression Screening, Fall Risk Screening, Immunizations)    Hemoglobin A1C (%)   Date Value   09/26/2019 4.7   02/05/2018 4.4   03/30/2016 5.2     LDL Cholesterol (mg/dL)   Date Value   09/26/2019 100     AST (U/L)   Date Value   09/26/2019 20     ALT (U/L)   Date Value   09/26/2019 17     BUN (mg/dL)   Date Value   10/08/2020 16      (goal A1C is < 7)   (goal LDL is <100) need 30-50% reduction from baseline     BP Readings from Last 3 Encounters:   12/16/20 122/88   02/19/20 108/68   02/03/20 115/69    (goal /80)      All Future Testing planned in [facility]:  Lab Frequency Next Occurrence   US BREASt COMPLETE LEFT Once 02/09/2021   SERGIO DIGITAL DIAGNOSTIC W OR WO CAD LEFT Once 02/09/2021       Next Visit Date:  Future Appointments   Date Time Provider Paul Daniels   2/23/2021 10:00 AM STA MAMMO RM 1 [facility] MAMMO STA Radiolog   2/23/2021 10:30 AM STA ULTRASOUND RM 3 [facility] US STA Radiolog   4/5/2021 11:00 AM Baldev Marin MD [address] [place] [address] [facility]            Patient Active Problem List:     HUE (generalized anxiety disorder)     Panic disorder     History of heroin abuse ([facility] [address]     Essential hypertension     Abnormal mammogram of left breast

## 2021-02-23 ENCOUNTER — HOSPITAL ENCOUNTER (OUTPATIENT)
Dept: ULTRASOUND IMAGING | Age: 41
Discharge: HOME | End: 2021-02-25
Payer: COMMERCIAL

## 2021-02-23 ENCOUNTER — HOSPITAL ENCOUNTER (OUTPATIENT)
Dept: MAMMOGRAPHY | Age: 41
Discharge: HOME | End: 2021-02-25
Payer: COMMERCIAL

## 2021-02-23 ENCOUNTER — TELEPHONE (OUTPATIENT)
Dept: OBGYN CLINIC | Age: 41
End: 2021-02-23

## 2021-02-23 DIAGNOSIS — R92.8 ABNORMAL MAMMOGRAM OF LEFT BREAST: ICD-10-CM

## 2021-02-23 DIAGNOSIS — N63.20 LEFT BREAST MASS: Primary | ICD-10-CM

## 2021-02-23 PROCEDURE — G0279 TOMOSYNTHESIS, MAMMO: HCPCS

## 2021-02-23 PROCEDURE — 76642 ULTRASOUND BREAST LIMITED: CPT

## 2021-02-23 NOTE — TELEPHONE ENCOUNTER
Attempted to call Booker Almaraz to discuss mammogram findings today but unable to reach and unable to leave message as mailbox is full

## 2021-03-03 ENCOUNTER — HOSPITAL ENCOUNTER (OUTPATIENT)
Dept: MAMMOGRAPHY | Age: 41
Discharge: HOME | End: 2021-03-05
Payer: COMMERCIAL

## 2021-03-03 ENCOUNTER — HOSPITAL ENCOUNTER (OUTPATIENT)
Dept: ULTRASOUND IMAGING | Age: 41
Discharge: HOME | End: 2021-03-05
Payer: COMMERCIAL

## 2021-03-03 DIAGNOSIS — Z98.890 STATUS POST BREAST BIOPSY: ICD-10-CM

## 2021-03-03 DIAGNOSIS — N63.20 LEFT BREAST MASS: ICD-10-CM

## 2021-03-03 PROCEDURE — 19083 BX BREAST 1ST LESION US IMAG: CPT

## 2021-03-03 PROCEDURE — A4648 IMPLANTABLE TISSUE MARKER: HCPCS

## 2021-03-03 PROCEDURE — 2500000003 HC RX 250 WO HCPCS

## 2021-03-03 PROCEDURE — 88305 TISSUE EXAM BY PATHOLOGIST: CPT

## 2021-03-04 ENCOUNTER — TELEPHONE (OUTPATIENT)
Dept: OBGYN CLINIC | Age: 41
End: 2021-03-04

## 2021-03-04 PROBLEM — D24.2 BREAST FIBROADENOMA, LEFT: Chronic | Status: ACTIVE | Noted: 2021-03-04

## 2021-03-04 PROBLEM — D24.2 BREAST FIBROADENOMA, LEFT: Status: ACTIVE | Noted: 2021-03-04

## 2021-03-04 PROBLEM — R92.8 ABNORMAL MAMMOGRAM OF LEFT BREAST: Status: RESOLVED | Noted: 2021-02-09 | Resolved: 2021-03-04

## 2021-03-04 LAB — SURGICAL PATHOLOGY REPORT: NORMAL

## 2021-04-05 ENCOUNTER — OFFICE VISIT (OUTPATIENT)
Dept: INTERNAL MEDICINE | Age: 41
End: 2021-04-05
Payer: COMMERCIAL

## 2021-04-05 VITALS
WEIGHT: 151 LBS | SYSTOLIC BLOOD PRESSURE: 122 MMHG | DIASTOLIC BLOOD PRESSURE: 75 MMHG | BODY MASS INDEX: 25.16 KG/M2 | HEIGHT: 65 IN | HEART RATE: 53 BPM

## 2021-04-05 DIAGNOSIS — Z63.72 ALCOHOLISM AND DRUG ADDICTION IN FAMILY: Primary | ICD-10-CM

## 2021-04-05 DIAGNOSIS — K21.9 GASTROESOPHAGEAL REFLUX DISEASE WITHOUT ESOPHAGITIS: ICD-10-CM

## 2021-04-05 DIAGNOSIS — F10.20 UNCOMPLICATED ALCOHOL DEPENDENCE (HCC): ICD-10-CM

## 2021-04-05 DIAGNOSIS — N91.1 SECONDARY AMENORRHEA: ICD-10-CM

## 2021-04-05 PROCEDURE — 99211 OFF/OP EST MAY X REQ PHY/QHP: CPT | Performed by: INTERNAL MEDICINE

## 2021-04-05 PROCEDURE — 99214 OFFICE O/P EST MOD 30 MIN: CPT | Performed by: INTERNAL MEDICINE

## 2021-04-05 RX ORDER — FLUTICASONE PROPIONATE 50 MCG
2 SPRAY, SUSPENSION (ML) NASAL DAILY
Qty: 1 BOTTLE | Refills: 3 | Status: SHIPPED | OUTPATIENT
Start: 2021-04-05 | End: 2021-12-14 | Stop reason: SDUPTHER

## 2021-04-05 RX ORDER — OMEPRAZOLE 20 MG/1
20 CAPSULE, DELAYED RELEASE ORAL
Qty: 90 CAPSULE | Refills: 1 | Status: SHIPPED | OUTPATIENT
Start: 2021-04-05 | End: 2021-12-14 | Stop reason: SDUPTHER

## 2021-04-05 ASSESSMENT — PATIENT HEALTH QUESTIONNAIRE - PHQ9
SUM OF ALL RESPONSES TO PHQ QUESTIONS 1-9: 2
SUM OF ALL RESPONSES TO PHQ QUESTIONS 1-9: 2

## 2021-04-05 NOTE — PATIENT INSTRUCTIONS
Pt was added to wait list for 6 months  Medications e-scribe to pharmacy of pt's choice. An After Visit Summary was printed and given to the patient.   CB

## 2021-04-05 NOTE — PROGRESS NOTES
[facility]      Date of Patient's Visit: 4/5/2021    Progress note    Patient Care Team:  Chas Coyle MD as PCP - General (Internal Medicine)  Chas Coyle MD as PCP - [facility] Empaneled Provider      CHIEF COMPLAINT  Chief Complaint   Patient presents with    Hypertension       MARLA Woo is a 36 y.o. female who presents for evaluation of anxiety and alcohol addiction     She has previous h/o drug addiction , she has been clean now but is still addicted to alcohol   She attends aa meetings and has no h/o DTs or withdrawals     She was prescribed celexa for hot flashes and depression but she still has not tried it   She is still taking Wellbutrin       ROS  All other review of systems negative, except for those noted.     Review of Systems    Past Medical History:   Diagnosis Date    Abnormal Pap smear of cervix     Anxiety     Chest pain     pt states had normal stress test    Depression     Drug abuse (HCC)     opiates    Heartburn     HSV-2 infection     Hypertension     Menorrhagia     Osteoarthritis     Pneumonia due to Staphylococcus aureus (HCC)     PTSD (post-traumatic stress disorder)     Trichomonosis 4/18/16    on pap 4/18/16       Past Surgical History:   Procedure Laterality Date    ENDOMETRIAL ABLATION      OTHER SURGICAL HISTORY  06/21/2016    hysteroscopy with Novasure    TUBAL LIGATION      US BREAST NEEDLE BIOPSY LEFT Left 3/3/2021    US BREAST NEEDLE BIOPSY LEFT 3/3/2021 [facility] ULTRASOUND       Family History   Problem Relation Age of Onset    Other Mother         fibroid    Heart Disease Mother     Hypertension Mother     Thyroid Disease Mother     Heart Disease Father     Other Father         deaf and legally blind    Breast Cancer Paternal Aunt     Breast Cancer Paternal Aunt     Other Other         aunts & grandmother & cousins father's have vaginal bleeding  issues    Cancer Paternal Cousin 28        cervical Social History     Socioeconomic History    Marital status: Single     Spouse name: None    Number of children: None    Years of education: None    Highest education level: None   Occupational History    None   Social Needs    Financial resource strain: Not hard at all   [place] insecurity     Worry: Never true     Inability: Never true    Transportation needs     Medical: No     Non-medical: No   Tobacco Use    Smoking status: Former Smoker     Packs/day: 0.50     Years: 18.00     Pack years: 9.00     Types: Cigarettes    Smokeless tobacco: Never Used   Substance and Sexual Activity    Alcohol use:  Yes     Alcohol/week: 0.0 standard drinks     Comment:  3 x yearly    Drug use: No     Types: IV     Comment: recovery x 6.5 years    Sexual activity: Yes     Partners: Male     Birth control/protection: Surgical     Comment: ablation   Lifestyle    Physical activity     Days per week: None     Minutes per session: None    Stress: None   Relationships    Social connections     Talks on phone: None     Gets together: None     Attends Anglican service: None     Active member of club or organization: None     Attends meetings of clubs or organizations: None     Relationship status: None    Intimate partner violence     Fear of current or ex partner: No     Emotionally abused: No     Physically abused: No     Forced sexual activity: No   Other Topics Concern    None   Social History Narrative    None       Current Outpatient Medications   Medication Sig Dispense Refill    fluticasone (FLONASE) 50 MCG/ACT nasal spray 2 sprays by Nasal route daily 1 Bottle 3    magnesium oxide (MAG-OX) 400 (241.3 Mg) MG TABS tablet Take 1 tablet by mouth daily 30 tablet 11    omeprazole (PRILOSEC) 20 MG delayed release capsule Take 1 capsule by mouth daily (with breakfast) 90 capsule 1    VITAMIN D PO Take by mouth      buPROPion (WELLBUTRIN XL) 300 MG extended release tablet TAKE ONE TABLET BY MOUTH EVERY MORNING 10/08/2020     10/08/2020    CO2 23 10/08/2020    BUN 16 10/08/2020    CREATININE 0.66 10/08/2020    GLUCOSE 84 10/08/2020     HEMOGLOBIN A1C:   Lab Results   Component Value Date    LABA1C 4.7 09/26/2019     MICROALBUMIN URINE: No results found for: MICROALBUR  FASTING LIPID PANEL:   Lab Results   Component Value Date    CHOL 176 02/05/2018    HDL 50 09/26/2019    TRIG 175 (H) 02/05/2018     Lab Results   Component Value Date    LDLCHOLESTEROL 100 09/26/2019     LIVER PROFILE:   Lab Results   Component Value Date    ALT 17 09/26/2019    AST 20 09/26/2019    PROT 7.8 09/26/2019    BILITOT 0.59 09/26/2019    LABALBU 4.9 09/26/2019      THYROID FUNCTION:   Lab Results   Component Value Date    TSH 1.44 12/11/2019      URINE ANALYSIS: No results found for: [address]    1. Gastroesophageal reflux disease without esophagitis    - fluticasone (FLONASE) 50 MCG/ACT nasal spray; 2 sprays by Nasal route daily  Dispense: 1 Bottle; Refill: 3  - magnesium oxide (MAG-OX) 400 (241.3 Mg) MG TABS tablet; Take 1 tablet by mouth daily  Dispense: 30 tablet; Refill: 11  - omeprazole (PRILOSEC) 20 MG delayed release capsule; Take 1 capsule by mouth daily (with breakfast)  Dispense: 90 capsule; Refill: 1    2. Alcoholism and drug addiction in family      3. Uncomplicated alcohol dependence ([address]      4. Secondary amenorrhea      Plan : continue wellbutryn   counseling provided for quitting alcohol and anxiety disorders. rtc in 9 months        Follow up Instructions:    1. Return in about 6 months (around 10/5/2021). 2. Reviewed prior labs and health maintenance. 3. Discussed use, benefit, and side effects of prescribed medications. Barriers to medication compliance addressed. All patient questions answered. Pt voiced understanding.      4. Patient given educational materials - see patient instructions      MD JERED Pimentel  Attending Physician, [address] [place] [address] [facility] [address] [place] [address]  4/5/2021, 1:09 PM

## 2021-06-28 RX ORDER — HYDROXYZINE HYDROCHLORIDE 25 MG/1
TABLET, FILM COATED ORAL
Qty: 30 TABLET | Refills: 4 | Status: SHIPPED | OUTPATIENT
Start: 2021-06-28 | End: 2021-11-30

## 2021-06-28 NOTE — TELEPHONE ENCOUNTER
E-scribe request for medication hydroxyzine . Pt is on wait list till October. hydroxyzine last refilled on 8/3/20 with 10 refills    Health Maintenance   Topic Date Due    COVID-19 Vaccine (1) Never done    Flu vaccine (Season Ended) 09/01/2021    Potassium monitoring  10/08/2021    Creatinine monitoring  10/08/2021    Cervical cancer screen  12/18/2021    Lipid screen  09/26/2024    DTaP/Tdap/Td vaccine (2 - Td or Tdap) 10/07/2026    Pneumococcal 0-64 years Vaccine (2 of 2 - PPSV23) 04/14/2045    Hepatitis C screen  Completed    HIV screen  Completed    Hepatitis A vaccine  Aged Out    Hepatitis B vaccine  Aged Out    Hib vaccine  Aged Out    Meningococcal (ACWY) vaccine  Aged Out    Varicella vaccine  Discontinued             (applicable per patient's age: Cancer Screenings, Depression Screening, Fall Risk Screening, Immunizations)    Hemoglobin A1C (%)   Date Value   09/26/2019 4.7   02/05/2018 4.4   03/30/2016 5.2     LDL Cholesterol (mg/dL)   Date Value   09/26/2019 100     AST (U/L)   Date Value   09/26/2019 20     ALT (U/L)   Date Value   09/26/2019 17     BUN (mg/dL)   Date Value   10/08/2020 16      (goal A1C is < 7)   (goal LDL is <100) need 30-50% reduction from baseline     BP Readings from Last 3 Encounters:   04/05/21 122/75   12/16/20 122/88   02/19/20 108/68    (goal /80)      All Future Testing planned in [facility]:  Lab Frequency Next Occurrence       Next Visit Date:  No future appointments.          Patient Active Problem List:     HUE (generalized anxiety disorder)     Panic disorder     History of heroin abuse ([facility] [address]     Essential hypertension     Left breast mass     Breast fibroadenoma, left

## 2021-07-16 ENCOUNTER — TELEPHONE (OUTPATIENT)
Dept: INTERNAL MEDICINE | Age: 41
End: 2021-07-16

## 2021-07-16 DIAGNOSIS — F41.1 GAD (GENERALIZED ANXIETY DISORDER): Primary | ICD-10-CM

## 2021-07-16 NOTE — TELEPHONE ENCOUNTER
----- Message from Trice Alaniz sent at 7/16/2021 11:18 AM EDT -----  Subject: Message to Provider    QUESTIONS  Information for Provider? patient is on vu and she states that the   medicine is not working for anxiety and depression   ---------------------------------------------------------------------------  --------------  CALL BACK INFO  What is the best way for the office to contact you? OK to leave message on   voicemail  Preferred Call Back Phone Number? [phone]  ---------------------------------------------------------------------------  --------------  SCRIPT ANSWERS  Relationship to Patient? Self  Appointment reason? Well Care/Follow Ups  Select a Well Care/Follow Ups appointment reason? Adult Existing Condition   Follow Up [Diabetes, CHF, COPD, Hypertension/Blood Pressure Check]  (Is the patient requesting to be seen urgently for their symptoms?)? No  Is this follow up request related to routine Diabetes Management? No  Are you having any new concerns about your existing condition?  Yes

## 2021-07-16 NOTE — TELEPHONE ENCOUNTER
PC from patient requesting an referral to [address] [place] [address] psych, referral pended please advise. Patient would like phone call back when referral and signed by doctor.

## 2021-07-16 NOTE — TELEPHONE ENCOUNTER
A referral was placed last year for [address] psychiatry .  Kindly ask the patient to schedule an appointment with them

## 2021-07-16 NOTE — TELEPHONE ENCOUNTER
PC to patient - patient given contact information for [facility] and was advised to contact to schedule appointment. Patient agreeable .

## 2021-08-24 ENCOUNTER — OFFICE VISIT (OUTPATIENT)
Dept: PSYCHOLOGY | Age: 41
End: 2021-08-24
Payer: COMMERCIAL

## 2021-08-24 DIAGNOSIS — F41.1 GENERALIZED ANXIETY DISORDER: Primary | ICD-10-CM

## 2021-08-24 DIAGNOSIS — F32.1 MAJOR DEPRESSIVE DISORDER, SINGLE EPISODE, MODERATE (HCC): ICD-10-CM

## 2021-08-24 PROCEDURE — 90791 PSYCH DIAGNOSTIC EVALUATION: CPT | Performed by: BEHAVIOR ANALYST

## 2021-08-24 NOTE — PROGRESS NOTES
Dg Byrd M.A. Psychology Doctoral Trainee    Supervising Clinical Psychologists:  Dajuan Anthony, Ph.D. Althea Wong,  Ph.D. Dain Dee Psy.D. [address] [phone]    Visit Date: 8/24/2021   Time of appointment:  11:20 am-12:00 pm   Time spent with Patient: 40 minutes. This is patient's first appointment. Reason for Consult:  New Patient, Anxiety, and Depression     Referring Provider/PCP:    No ref. provider found  Tremaine Kat MD      Pt provided informed consent for the behavioral health program. Discussed with patient model of service to include the limits of confidentiality (i.e. abuse reporting, suicide intervention, etc.) and short-term intervention focused approach. Pt indicated understanding. Also discussed with patient that the service provider is a supervised clinician and is being supervised by Dr. Reyna Pérez and/or Dr. Tomas Montenegro or Dr. Vernestine Denver. Patient signed the consent form agreeing to be seen by a supervised clinician. PRESENTING PROBLEM AND HISTORY  Cathi Mcardle is a 39 y.o. female who presents for new evaluation and treatment of anxiety, depression. She reported the following depression symptoms: depressed mood, anhedonia, increased appetite, weight gain, decreased sleep, fatigue/lack of energy, excessive guilt, feelings of worthlessness, anger/irritability, and mood swings. She also reported the following anxiety symptoms: feeling nervous, anxious, or on edge, racing worry thoughts, inability to stop or control worry, difficulty with attention/concentration and muscle tension. She reported she has been experiencing symptoms of anxiety for at least 6 months; however, she stated that one month ago her symptoms worsened.  She had an exacerbating incident approximately one month ago when she was drinking with her partner's sisters and started having excessive worry thoughts and paranoid thoughts about her partner (e.g., he was doing drugs again). She reported feeling angry and confronted her partner and asked him to take drug tests. She reported she didn't like how she reacted and how she \"created a scene\" and this is when she decided to seek help. Onset of symptoms was approximately 1 month ago. Symptoms have been unchanged since that time. She denies current suicidal and homicidal ideation. Family history significant for alcoholism and anxiety (specific family members not assessed). Risk factors: history of substance use (opioids and stimulants). Previous treatment includes not assessed. She complains of the following medication side effects: not assessed. MENTAL STATUS EXAM  Mood was anxious and sad with anxious affect. Suicidal ideation was denied. Homicidal ideation was denied. Hygiene was good . Dress was appropriate. Behavior was Within Normal Limits with No observation or self-report of difficulties ambulating. Attitude was Cooperative and Help-seeking. Eye-contact was good. Speech: rate - WNL, rhythm -  WNL, volume - WNL  Verbalizations were goal directed and coherent. Thought processes were intact and goal-oriented without evidence of delusions, hallucinations, obsessions, or vinay. Associations were characterized by intact cognitive processes. Pt was oriented to person, place, time, and general circumstances;  recent:  good and remote:  good. Insight and judgment were estimated to be good, AEB, a good  understanding of cyclical maladaptive patterns, and the ability to use insight to inform behavior change.      CURRENT MEDICATIONS    Current Outpatient Medications:     hydrOXYzine (ATARAX) 25 MG tablet, TAKE ONE TABLET BY MOUTH THREE TIMES A DAY AS NEEDED FOR ITCHING, Disp: 30 tablet, Rfl: 4    fluticasone (FLONASE) 50 MCG/ACT nasal spray, 2 sprays by Nasal route daily, Disp: 1 Bottle, Rfl: 3    magnesium oxide (MAG-OX) 400 (241.3 Mg) MG TABS tablet, Take 1 tablet by mouth daily, Disp: 30 tablet, Rfl: 11    omeprazole (PRILOSEC) 20 MG delayed release capsule, Take 1 capsule by mouth daily (with breakfast), Disp: 90 capsule, Rfl: 1    VITAMIN D PO, Take by mouth, Disp: , Rfl:     buPROPion (WELLBUTRIN XL) 300 MG extended release tablet, TAKE ONE TABLET BY MOUTH EVERY MORNING, Disp: 30 tablet, Rfl: 10    lisinopril (PRINIVIL;ZESTRIL) 10 MG tablet, TAKE ONE TABLET BY MOUTH DAILY, Disp: 30 tablet, Rfl: 10    propranolol (INDERAL LA) 60 MG extended release capsule, TAKE ONE CAPSULE BY MOUTH DAILY, Disp: 30 capsule, Rfl: 10    aspirin 81 MG chewable tablet, , Disp: , Rfl:      FAMILY MEDICAL/MH HISTORY   Her family history includes Breast Cancer in her paternal aunt and paternal aunt; Cancer (age of onset: 28) in her paternal cousin; Heart Disease in her father and mother; Hypertension in her mother; Other in her father, mother, and another family member; Thyroid Disease in her mother. PATIENT MENTAL HEALTH HISTORY  She reported a history of substance use disorder that started at age 25 when she started taking OxyContin. She then started using heroin and crack cocaine around age 22 until March of 2016. She reported in April through July of 2016 she was taking suboxone and Benzodiazepines. She reported has not used opioids, stimulants or Benzodiazepines since late 2016. She denied a history of suicide attempts or psychiatric hospitalizations. Current psychotropic medication use was not assessed. PSYCHOSOCIAL HISTORY  Current living situation: She currently lives with her long-term male partner and 4 children (ages 15, 6, 8, and 9). She has two older children (ages 25 and 21) who no longer live at home. She has never been . Work/Education: She is a stay-at-home mom. Support system: She described her partner, her partner's two sisters, her children, and her paternal grandmother as her support system.     Moravian/Spirituality: She reported she goes to [employer] regularly. DRUG AND ALCOHOL CURRENT USE/HISTORY  TOBACCO:  She reports that she has quit smoking. Her smoking use included cigarettes. She has a 9.00 pack-year smoking history. She has never used smokeless tobacco.  ALCOHOL:  Current alcohol use not assessed. OTHER SUBSTANCES: She reports previous drug use. Drug: IV. Recovery x 6.5 years. ASSESSMENT  Valeria Tate presented to the appointment today for evaluation and treatment of symptoms of anxiety and depression. She currently presents with no risk to herself or others and presents with symptoms consistent with a diagnosis of generalized anxiety disorder evidenced by symptoms of feeling nervous, anxious, or on edge, racing worry thoughts, inability to stop or control worry, difficulty with attention/concentration and muscle tension for the past 6 months. She also presents with symptoms consistent with a diagnosis of major depressive disorder, moderate evidenced by symptoms of depressed mood, anhedonia, increased appetite, weight gain, decreased sleep, fatigue/lack of energy, excessive guilt, feelings of worthlessness, anger/irritability, and mood swings. She is currently taking hydroxyzine & Wellbutrin (per her chart). She will likely benefit from a medication evaluation to assess whether psychotropic medications are helpful in treating symptoms. Pt's symptoms are not well controlled at this time. She will also likely benefit from brief and solution-focused consultation to address cognitive and behavioral interventions for anxiety and depression symptoms. Pt was in agreement with recommendations.     PHQ Scores 4/5/2021 2/3/2020 12/11/2019 2/26/2019 8/21/2018 6/1/2017 10/27/2016   PHQ2 Score 2 0 0 0 0 0 3   PHQ9 Score 2 0 0 0 0 0 14     Interpretation of Total Score Depression Severity: 1-4 = Minimal depression, 5-9 = Mild depression, 10-14 = Moderate depression, 15-19 = Moderately severe depression, 20-27 = Severe depression    How often pt has had thoughts of death or hurting self (if PHQ positive for depression):       No flowsheet data found. Interpretation of HUE-7 score: 5-9 = mild anxiety, 10-14 = moderate anxiety, 15+ = severe anxiety. Recommend referral to behavioral health for scores 10 or greater. DIAGNOSIS  Nicky Miller was seen today for new patient, anxiety and depression. Diagnoses and all orders for this visit:    Generalized anxiety disorder    Major depressive disorder, single episode, moderate ([facility] [address]        INTERVENTION  Discussed various factors related to the development and maintenance of  depression and anxiety (including biological, cognitive, behavioral, and environmental factors), Discussed potential treatments for  depression and anxiety, Established rapport, [place]-setting to identify pt's primary goals for [address] visit / overall health, Supportive techniques and Emphasized self-care as important for managing overall health      PLAN  Follow up in 2 weeks.       Electronically signed by Kimmy Stephens on 8/24/21 at 1:41 PM EDT

## 2021-08-29 DIAGNOSIS — I10 ESSENTIAL HYPERTENSION: ICD-10-CM

## 2021-08-29 DIAGNOSIS — F41.1 GAD (GENERALIZED ANXIETY DISORDER): ICD-10-CM

## 2021-08-31 RX ORDER — LISINOPRIL 10 MG/1
TABLET ORAL
Qty: 30 TABLET | Refills: 10 | Status: SHIPPED | OUTPATIENT
Start: 2021-08-31 | End: 2022-08-03

## 2021-08-31 RX ORDER — PROPRANOLOL HCL 60 MG
CAPSULE, EXTENDED RELEASE 24HR ORAL
Qty: 30 CAPSULE | Refills: 10 | Status: SHIPPED | OUTPATIENT
Start: 2021-08-31 | End: 2022-07-26

## 2021-08-31 NOTE — TELEPHONE ENCOUNTER
E-scribe request for medication lisinopril and propranolol. Pt is on wait list till Oct, LM on VM for pt to call office.      Health Maintenance   Topic Date Due    COVID-19 Vaccine (1) Never done    Flu vaccine (1) 09/01/2021    Potassium monitoring  10/08/2021    Creatinine monitoring  10/08/2021    Cervical cancer screen  12/18/2021    Lipid screen  09/26/2024    DTaP/Tdap/Td vaccine (2 - Td or Tdap) 10/07/2026    Pneumococcal 0-64 years Vaccine (2 of 2 - PPSV23) 04/14/2045    Hepatitis C screen  Completed    HIV screen  Completed    Hepatitis A vaccine  Aged Out    Hepatitis B vaccine  Aged Out    Hib vaccine  Aged Out    Meningococcal (ACWY) vaccine  Aged Out    Varicella vaccine  Discontinued             (applicable per patient's age: Cancer Screenings, Depression Screening, Fall Risk Screening, Immunizations)    Hemoglobin A1C (%)   Date Value   09/26/2019 4.7   02/05/2018 4.4   03/30/2016 5.2     LDL Cholesterol (mg/dL)   Date Value   09/26/2019 100     AST (U/L)   Date Value   09/26/2019 20     ALT (U/L)   Date Value   09/26/2019 17     BUN (mg/dL)   Date Value   10/08/2020 16      (goal A1C is < 7)   (goal LDL is <100) need 30-50% reduction from baseline     BP Readings from Last 3 Encounters:   04/05/21 122/75   12/16/20 122/88   02/19/20 108/68    (goal /80)      All Future Testing planned in CarePATH:  Lab Frequency Next Occurrence       Next Visit Date:  Future Appointments   Date Time Provider Paul Daniels   9/9/2021 10:00  [facility]            Patient Active Problem List:     HUE (generalized anxiety disorder)     Panic disorder     History of heroin abuse ([facility] [address]     Essential hypertension     Left breast mass     Breast fibroadenoma, left

## 2021-09-16 ENCOUNTER — TELEPHONE (OUTPATIENT)
Dept: PSYCHOLOGY | Age: 41
End: 2021-09-16

## 2021-09-30 ENCOUNTER — OFFICE VISIT (OUTPATIENT)
Dept: PRIMARY CARE CLINIC | Age: 41
End: 2021-09-30
Payer: COMMERCIAL

## 2021-09-30 VITALS
SYSTOLIC BLOOD PRESSURE: 114 MMHG | HEIGHT: 65 IN | TEMPERATURE: 97.5 F | WEIGHT: 150 LBS | DIASTOLIC BLOOD PRESSURE: 82 MMHG | OXYGEN SATURATION: 99 % | HEART RATE: 57 BPM | BODY MASS INDEX: 24.99 KG/M2

## 2021-09-30 DIAGNOSIS — R11.0 NAUSEA: ICD-10-CM

## 2021-09-30 DIAGNOSIS — R10.9 ABDOMINAL CRAMPING: Primary | ICD-10-CM

## 2021-09-30 PROCEDURE — 99213 OFFICE O/P EST LOW 20 MIN: CPT

## 2021-09-30 RX ORDER — ONDANSETRON 4 MG/1
4 TABLET, ORALLY DISINTEGRATING ORAL EVERY 8 HOURS PRN
Qty: 15 TABLET | Refills: 0 | Status: SHIPPED | OUTPATIENT
Start: 2021-09-30 | End: 2021-10-05

## 2021-09-30 RX ORDER — DICYCLOMINE HYDROCHLORIDE 10 MG/1
10 CAPSULE ORAL 4 TIMES DAILY
Qty: 28 CAPSULE | Refills: 0 | Status: SHIPPED | OUTPATIENT
Start: 2021-09-30 | End: 2021-12-14 | Stop reason: ALTCHOICE

## 2021-09-30 ASSESSMENT — ENCOUNTER SYMPTOMS
ABDOMINAL DISTENTION: 1
NAUSEA: 1
BACK PAIN: 0
DIARRHEA: 0
CONSTIPATION: 0
VOMITING: 0
ABDOMINAL PAIN: 1

## 2021-09-30 NOTE — PATIENT INSTRUCTIONS
Use Zofran as needed for any nausea or vomiting. May use Bentyl as needed for abdominal cramping/pain. Recommend following a bland diet and progressing as tolerated. Follow-up with PCP as needed. Patient Education        Gastroenteritis: Care Instructions  Your Care Instructions     Gastroenteritis is an illness that may cause nausea, vomiting, and diarrhea. It is sometimes called \"stomach flu. \" It can be caused by bacteria or a virus. You will probably begin to feel better in 1 to 2 days. In the meantime, get plenty of rest and make sure you do not become dehydrated. Dehydration occurs when your body loses too much fluid. Follow-up care is a key part of your treatment and safety. Be sure to make and go to all appointments, and call your doctor if you are having problems. It's also a good idea to know your test results and keep a list of the medicines you take. How can you care for yourself at home? · If your doctor prescribed antibiotics, take them as directed. Do not stop taking them just because you feel better. You need to take the full course of antibiotics. · Drink plenty of fluids to prevent dehydration. Choose water and other clear liquids until you feel better. If you have kidney, heart, or liver disease and have to limit fluids, talk with your doctor before you increase your fluid intake. · Drink fluids slowly, in frequent, small amounts, because drinking too much too fast can cause vomiting. · Begin eating mild foods, such as dry toast, yogurt, applesauce, bananas, and rice. Avoid spicy, hot, or high-fat foods, and do not drink alcohol or caffeine for a day or two. Do not drink milk or eat ice cream until you are feeling better. How to prevent gastroenteritis  · Keep hot foods hot and cold foods cold. · Do not eat meats, dressings, salads, or other foods that have been kept at room temperature for more than 2 hours. · Use a thermometer to check your refrigerator.  It should be between 34°F and 40°F.  · Defrost meats in the refrigerator or microwave, not on the kitchen counter. · Keep your hands and your kitchen clean. Wash your hands, cutting boards, and countertops with hot soapy water frequently. · Cook meat until it is well done. · Do not eat raw eggs or uncooked sauces made with raw eggs. · Do not take chances. If food looks or tastes spoiled, throw it out. When should you call for help? Call 911 anytime you think you may need emergency care. For example, call if:    · You vomit blood or what looks like coffee grounds.     · You passed out (lost consciousness).     · You pass maroon or very bloody stools. Call your doctor now or seek immediate medical care if:    · You have severe belly pain.     · You have signs of needing more fluids. You have sunken eyes, a dry mouth, and pass only a little urine.     · You feel like you are going to faint.     · You have increased belly pain that does not go away in 1 to 2 days.     · You have new or increased nausea, or you are vomiting.     · You have a new or higher fever.     · Your stools are black and tarlike or have streaks of blood. Watch closely for changes in your health, and be sure to contact your doctor if:    · You are dizzy or lightheaded.     · You urinate less than usual, or your urine is dark yellow or brown.     · You do not feel better with each day that goes by. Where can you learn more? Go to https://Wickr.QuadROI. org and sign in to your Gauzy account. Enter N142 in the [facility] box to learn more about \"Gastroenteritis: Care Instructions. \"     If you do not have an account, please click on the \"Sign Up Now\" link. Current as of: July 1, 2021               Content Version: 13.0  © 0652-7283 Healthwise, Incorporated. Care instructions adapted under license by [facility] ([facility]).  If you have questions about a medical condition or this instruction, always ask your healthcare professional. [employer] disclaims any warranty or liability for your use of this information.

## 2021-09-30 NOTE — PROGRESS NOTES
[facility] [address] [place] [facility]  [address] [facility] [address]    [facility] [address] [facility]  [address] [address]  Dept: [phone]    Mony Haynes is a 39 y.o. female Established patient, who presents to the walk-in clinic today with conditions/complaints as noted below:    Chief Complaint   Patient presents with    Abdominal Pain     stomach ache x 4 days          HPI:     Abdominal Pain  This is a new problem. The current episode started yesterday. The onset quality is gradual. The problem occurs intermittently. The most recent episode lasted 1 day. The problem has been waxing and waning. The pain is located in the epigastric region. The pain is at a severity of 6/10. The quality of the pain is aching and cramping. The abdominal pain does not radiate. Associated symptoms include nausea. Pertinent negatives include no constipation, diarrhea, dysuria, fever, hematuria or vomiting. Exacerbated by: drinking fluids. The pain is relieved by nothing. She has tried nothing for the symptoms.        Past Medical History:   Diagnosis Date    Abnormal Pap smear of cervix     Anxiety     Chest pain     pt states had normal stress test    Depression     Drug abuse ([facility])     opiates    Heartburn     HSV-2 infection     Hypertension     Menorrhagia     Osteoarthritis     Pneumonia due to Staphylococcus aureus ([facility])     PTSD (post-traumatic stress disorder)     Trichomonosis 4/18/16    on pap 4/18/16       Current Outpatient Medications   Medication Sig Dispense Refill    ondansetron (ZOFRAN-ODT) 4 MG disintegrating tablet Take 1 tablet by mouth every 8 hours as needed for Nausea or Vomiting 15 tablet 0    dicyclomine (BENTYL) 10 MG capsule Take 1 capsule by mouth 4 times daily for 7 days 28 capsule 0    propranolol (INDERAL LA) 60 MG extended release capsule TAKE ONE CAPSULE BY MOUTH DAILY 30 capsule 10    lisinopril (PRINIVIL;ZESTRIL) 10 MG tablet TAKE ONE TABLET BY MOUTH DAILY 30 tablet 10    hydrOXYzine (ATARAX) 25 MG tablet TAKE ONE TABLET BY MOUTH THREE TIMES A DAY AS NEEDED FOR ITCHING 30 tablet 4    fluticasone (FLONASE) 50 MCG/ACT nasal spray 2 sprays by Nasal route daily 1 Bottle 3    magnesium oxide (MAG-OX) 400 (241.3 Mg) MG TABS tablet Take 1 tablet by mouth daily 30 tablet 11    omeprazole (PRILOSEC) 20 MG delayed release capsule Take 1 capsule by mouth daily (with breakfast) 90 capsule 1    VITAMIN D PO Take by mouth      buPROPion (WELLBUTRIN XL) 300 MG extended release tablet TAKE ONE TABLET BY MOUTH EVERY MORNING 30 tablet 10    aspirin 81 MG chewable tablet        No current facility-administered medications for this visit. Allergies   Allergen Reactions    Codeine Other (See Comments)     Welts as a child    Morphine Other (See Comments)     hives    Sulfa Antibiotics Other (See Comments)     Welts after dying hair       :     Review of Systems   Constitutional: Negative for chills and fever. Gastrointestinal: Positive for abdominal distention (bloating), abdominal pain and nausea. Negative for constipation, diarrhea and vomiting. Genitourinary: Negative for dysuria and hematuria. Musculoskeletal: Negative for back pain.       :     /82   Pulse 57   Temp 97.5 °F (36.4 °C) (Infrared)   Ht 5' 5\" (1.651 m)   Wt 150 lb (68 kg)   SpO2 99%   BMI 24.96 kg/m²     Physical Exam  Vitals reviewed. Constitutional:       General: She is not in acute distress. Appearance: Normal appearance. Cardiovascular:      Rate and Rhythm: Normal rate and regular rhythm. Heart sounds: Normal heart sounds. Pulmonary:      Effort: Pulmonary effort is normal.      Breath sounds: Normal breath sounds. Abdominal:      General: Abdomen is flat. Bowel sounds are normal. There is no distension. Palpations: Abdomen is soft. Tenderness: There is no abdominal tenderness.  There is no guarding or rebound. Skin:     General: Skin is warm and dry. Neurological:      Mental Status: She is alert and oriented to person, place, and time. Psychiatric:         Attention and Perception: Attention normal.         Mood and Affect: Mood normal.         Speech: Speech normal.         Behavior: Behavior normal. Behavior is cooperative.           :          1. Abdominal cramping  -     dicyclomine (BENTYL) 10 MG capsule; Take 1 capsule by mouth 4 times daily for 7 days, Disp-28 capsule, R-0Normal  2. Nausea  -     ondansetron (ZOFRAN-ODT) 4 MG disintegrating tablet; Take 1 tablet by mouth every 8 hours as needed for Nausea or Vomiting, Disp-15 tablet, R-0Normal       :      Return if symptoms worsen or fail to improve. Orders Placed This Encounter   Medications    ondansetron (ZOFRAN-ODT) 4 MG disintegrating tablet     Sig: Take 1 tablet by mouth every 8 hours as needed for Nausea or Vomiting     Dispense:  15 tablet     Refill:  0    dicyclomine (BENTYL) 10 MG capsule     Sig: Take 1 capsule by mouth 4 times daily for 7 days     Dispense:  28 capsule     Refill:  0      Offered testing for COVID-19 due to current sick child and GI symptoms, patient refused. Offered to order lab work to rule-out abdominal abnormalities, patient didn't feel it's necessary at this time. Instructed to use Zofran as needed for any nausea or vomiting. May use Bentyl for any abdominal cramping/pain. Advised following a bland diet and slowly progressing as tolerated. Seek medical attention for worsening abdominal pain, fever, chills, uncontrolled vomiting, or inability to tolerate oral fluids. Follow-up with PCP as needed. Patient and/or parent given educational materials - see patient instructions. Discussed use, benefit, and side effects of prescribed medications. All patient questions answered. Patient and/or parent voiced understanding.       Electronically signed by Salomon Rinne, APRN - CNPon 9/30/2021 at 12:27 PM

## 2021-10-01 DIAGNOSIS — F41.1 GAD (GENERALIZED ANXIETY DISORDER): ICD-10-CM

## 2021-10-06 NOTE — TELEPHONE ENCOUNTER
E-scribing request for [facility]  pt has no future appt. Last seen 4/5/21. Please advise. Health Maintenance   Topic Date Due    COVID-19 Vaccine (1) Never done    Flu vaccine (1) 09/01/2021    Potassium monitoring  10/08/2021    Creatinine monitoring  10/08/2021    Cervical cancer screen  11/27/2021    Lipid screen  09/26/2024    DTaP/Tdap/Td vaccine (2 - Td or Tdap) 10/07/2026    Pneumococcal 0-64 years Vaccine (2 of 2 - PPSV23) 04/14/2045    Hepatitis C screen  Completed    HIV screen  Completed    Hepatitis A vaccine  Aged Out    Hepatitis B vaccine  Aged Out    Hib vaccine  Aged Out    Meningococcal (ACWY) vaccine  Aged Out    Varicella vaccine  Discontinued             (applicable per patient's age: Cancer Screenings, Depression Screening, Fall Risk Screening, Immunizations)    Hemoglobin A1C (%)   Date Value   09/26/2019 4.7   02/05/2018 4.4   03/30/2016 5.2     LDL Cholesterol (mg/dL)   Date Value   09/26/2019 100     AST (U/L)   Date Value   09/26/2019 20     ALT (U/L)   Date Value   09/26/2019 17     BUN (mg/dL)   Date Value   10/08/2020 16      (goal A1C is < 7)   (goal LDL is <100) need 30-50% reduction from baseline     BP Readings from Last 3 Encounters:   09/30/21 114/82   04/05/21 122/75   12/16/20 122/88    (goal /80)      All Future Testing planned in Care[facility]:  Lab Frequency Next Occurrence       Next Visit Date:  No future appointments.          Patient Active Problem List:     HUE (generalized anxiety disorder)     Panic disorder     History of heroin abuse ([facility] [address]     Essential hypertension     Left breast mass     Breast fibroadenoma, left

## 2021-10-07 RX ORDER — BUPROPION HYDROCHLORIDE 300 MG/1
TABLET ORAL
Qty: 30 TABLET | Refills: 10 | Status: SHIPPED | OUTPATIENT
Start: 2021-10-07

## 2021-11-30 RX ORDER — HYDROXYZINE HYDROCHLORIDE 25 MG/1
TABLET, FILM COATED ORAL
Qty: 30 TABLET | Refills: 4 | Status: SHIPPED | OUTPATIENT
Start: 2021-11-30 | End: 2022-04-12

## 2021-12-01 ENCOUNTER — OFFICE VISIT (OUTPATIENT)
Dept: PRIMARY CARE CLINIC | Age: 41
End: 2021-12-01
Payer: COMMERCIAL

## 2021-12-01 VITALS
TEMPERATURE: 98.1 F | SYSTOLIC BLOOD PRESSURE: 121 MMHG | DIASTOLIC BLOOD PRESSURE: 70 MMHG | OXYGEN SATURATION: 98 % | HEART RATE: 72 BPM

## 2021-12-01 DIAGNOSIS — J01.90 ACUTE BACTERIAL SINUSITIS: Primary | ICD-10-CM

## 2021-12-01 DIAGNOSIS — J02.9 SORE THROAT: ICD-10-CM

## 2021-12-01 DIAGNOSIS — B96.89 ACUTE BACTERIAL SINUSITIS: Primary | ICD-10-CM

## 2021-12-01 LAB — S PYO AG THROAT QL: NORMAL

## 2021-12-01 PROCEDURE — 99214 OFFICE O/P EST MOD 30 MIN: CPT

## 2021-12-01 PROCEDURE — 87880 STREP A ASSAY W/OPTIC: CPT

## 2021-12-01 RX ORDER — AZITHROMYCIN 250 MG/1
250 TABLET, FILM COATED ORAL SEE ADMIN INSTRUCTIONS
Qty: 6 TABLET | Refills: 0 | Status: SHIPPED | OUTPATIENT
Start: 2021-12-01 | End: 2021-12-06

## 2021-12-01 RX ORDER — PREDNISONE 20 MG/1
40 TABLET ORAL DAILY
Qty: 10 TABLET | Refills: 0 | Status: SHIPPED | OUTPATIENT
Start: 2021-12-01 | End: 2021-12-06

## 2021-12-01 NOTE — PATIENT INSTRUCTIONS
Patient Education        Sinusitis: Care Instructions  Your Care Instructions     Sinusitis is an infection of the lining of the sinus cavities in your head. Sinusitis often follows a cold. It causes pain and pressure in your head and face. In most cases, sinusitis gets better on its own in 1 to 2 weeks. But some mild symptoms may last for several weeks. Sometimes antibiotics are needed. Follow-up care is a key part of your treatment and safety. Be sure to make and go to all appointments, and call your doctor if you are having problems. It's also a good idea to know your test results and keep a list of the medicines you take. How can you care for yourself at home? · Take an over-the-counter pain medicine, such as acetaminophen (Tylenol), ibuprofen (Advil, Motrin), or naproxen (Aleve). Read and follow all instructions on the label. · If the doctor prescribed antibiotics, take them as directed. Do not stop taking them just because you feel better. You need to take the full course of antibiotics. · Be careful when taking over-the-counter cold or flu medicines and Tylenol at the same time. Many of these medicines have acetaminophen, which is Tylenol. Read the labels to make sure that you are not taking more than the recommended dose. Too much acetaminophen (Tylenol) can be harmful. · Breathe warm, moist air from a steamy shower, a hot bath, or a sink filled with hot water. Avoid cold, dry air. Using a humidifier in your home may help. Follow the directions for cleaning the machine. · Use saline (saltwater) nasal washes. This can help keep your nasal passages open and wash out mucus and bacteria. You can buy saline nose drops at a grocery store or drugstore. Or you can make your own at home by adding 1 teaspoon of salt and 1 teaspoon of baking soda to 2 cups of distilled water. If you make your own, fill a bulb syringe with the solution, insert the tip into your nostril, and squeeze gently. Elda Chol your nose.   · Put a hot, wet towel or a warm gel pack on your face 3 or 4 times a day for 5 to 10 minutes each time. · Try a decongestant nasal spray like oxymetazoline (Afrin). Do not use it for more than 3 days in a row. Using it for more than 3 days can make your congestion worse. When should you call for help? Call your doctor now or seek immediate medical care if:    · You have new or worse swelling or redness in your face or around your eyes.     · You have a new or higher fever. Watch closely for changes in your health, and be sure to contact your doctor if:    · You have new or worse facial pain.     · The mucus from your nose becomes thicker (like pus) or has new blood in it.     · You are not getting better as expected. Where can you learn more? Go to https://MamapeEpoqeb.IPextreme. org and sign in to your feedPack account. Enter G933 in the iAm[employer] box to learn more about \"Sinusitis: Care Instructions. \"     If you do not have an account, please click on the \"Sign Up Now\" link. Current as of: December 2, 2020               Content Version: 13.0  © 9800-3655 [employer]. Care instructions adapted under license by [facility] ([facility]). If you have questions about a medical condition or this instruction, always ask your healthcare professional. [address]  any warranty or liability for your use of this information.

## 2021-12-01 NOTE — PROGRESS NOTES
[facility] [address] [place] [facility]  [address] [facility] [address]    [facility] [address] [facility]ALK [facility]  [address] [place] [address]  Dept: [phone]    Zachariah Flynn is a 39 y.o. female Established patient, who presents to the walk-in clinic today with conditions/complaints as noted below:    Chief Complaint   Patient presents with    Pharyngitis     sore throat, headache, ear pain x 4 days         HPI:     Pharyngitis  This is a new problem. Episode onset: 4 days ago. The problem occurs constantly. The problem has been gradually worsening. Associated symptoms include a sore throat. Pertinent negatives include no abdominal pain, anorexia, arthralgias, change in bowel habit, chest pain, chills, congestion, coughing, diaphoresis, fatigue, fever, headaches, joint swelling, myalgias, nausea, neck pain, numbness, rash, swollen glands, urinary symptoms, vertigo, visual change, vomiting or weakness. The symptoms are aggravated by swallowing and coughing. She has tried acetaminophen and NSAIDs for the symptoms. The treatment provided mild relief. Declines COVID testing.      Past Medical History:   Diagnosis Date    Abnormal Pap smear of cervix     Anxiety     Chest pain     pt states had normal stress test    Depression     Drug abuse ([facility])     opiates    Heartburn     HSV-2 infection     Hypertension     Menorrhagia     Osteoarthritis     Pneumonia due to Staphylococcus aureus ([facility])     PTSD (post-traumatic stress disorder)     Trichomonosis 4/18/16    on pap 4/18/16       Current Outpatient Medications   Medication Sig Dispense Refill    azithromycin (ZITHROMAX) 250 MG tablet Take 1 tablet by mouth See Admin Instructions for 5 days 500mg on day 1 followed by 250mg on days 2 - 5 6 tablet 0    predniSONE (DELTASONE) 20 MG tablet Take 2 tablets by mouth daily for 5 days 10 tablet 0    hydrOXYzine (ATARAX) 25 MG tablet TAKE ONE TABLET BY MOUTH THREE TIMES A DAY AS NEEDED FOR ITCHING 30 tablet 4    buPROPion (WELLBUTRIN XL) 300 MG extended release tablet TAKE ONE TABLET BY MOUTH EVERY MORNING 30 tablet 10    propranolol (INDERAL LA) 60 MG extended release capsule TAKE ONE CAPSULE BY MOUTH DAILY 30 capsule 10    lisinopril (PRINIVIL;ZESTRIL) 10 MG tablet TAKE ONE TABLET BY MOUTH DAILY 30 tablet 10    fluticasone (FLONASE) 50 MCG/ACT nasal spray 2 sprays by Nasal route daily 1 Bottle 3    magnesium oxide (MAG-OX) 400 (241.3 Mg) MG TABS tablet Take 1 tablet by mouth daily 30 tablet 11    omeprazole (PRILOSEC) 20 MG delayed release capsule Take 1 capsule by mouth daily (with breakfast) 90 capsule 1    VITAMIN D PO Take by mouth      aspirin 81 MG chewable tablet       dicyclomine (BENTYL) 10 MG capsule Take 1 capsule by mouth 4 times daily for 7 days 28 capsule 0     No current facility-administered medications for this visit. Allergies   Allergen Reactions    Codeine Other (See Comments)     Welts as a child    Morphine Other (See Comments)     hives    Sulfa Antibiotics Other (See Comments)     Welts after dying hair       Review of Systems:     Review of Systems   Constitutional: Negative. Negative for activity change, appetite change, chills, diaphoresis, fatigue, fever and unexpected weight change. HENT: Positive for ear pain, postnasal drip, sinus pressure and sore throat. Negative for congestion, rhinorrhea, sinus pain and sneezing. Eyes: Negative. Negative for pain, discharge and itching. Respiratory: Negative. Negative for cough, chest tightness, shortness of breath and wheezing. Cardiovascular: Negative. Negative for chest pain, palpitations and leg swelling. Gastrointestinal: Negative for abdominal pain, anorexia, change in bowel habit, diarrhea, nausea and vomiting. Musculoskeletal: Negative for arthralgias, joint swelling, myalgias and neck pain. Skin: Negative. Negative for rash.    Neurological: time. Mental status is at baseline. Psychiatric:         Mood and Affect: Mood normal.         Behavior: Behavior normal.         Plan:          1. Acute bacterial sinusitis  -     azithromycin (ZITHROMAX) 250 MG tablet; Take 1 tablet by mouth See Admin Instructions for 5 days 500mg on day 1 followed by 250mg on days 2 - 5, Disp-6 tablet, R-0Normal  2. Sore throat  -     POCT rapid strep A  -     predniSONE (DELTASONE) 20 MG tablet; Take 2 tablets by mouth daily for 5 days, Disp-10 tablet, R-0Normal     Results for POC orders placed in visit on 12/01/21   POCT rapid strep A   Result Value Ref Range    Strep A Ag None Detected None Detected       Follow Up Instructions:      Return if symptoms worsen or fail to improve. Orders Placed This Encounter   Medications    azithromycin (ZITHROMAX) 250 MG tablet     Sig: Take 1 tablet by mouth See Admin Instructions for 5 days 500mg on day 1 followed by 250mg on days 2 - 5     Dispense:  6 tablet     Refill:  0    predniSONE (DELTASONE) 20 MG tablet     Sig: Take 2 tablets by mouth daily for 5 days     Dispense:  10 tablet     Refill:  0            Patient instructed to complete entire antibiotic course. Tylenol/Motrin as needed for fever/discomfort. Change toothbrush in 24 hours. Salt water gargles and throat lozenges if desired. Patient agreeable to treatment plan. Educational materials provided on AVS.  Follow up if symptoms do not improve/worsen. Patient and/or parent given educational materials - see patient instructions. Discussed use, benefit, and side effects of prescribed medications. All patient questions answered. Patient and/or parent voiced understanding.       Electronically signed by TG Vivas 12/1/2021 at 6:00 PM

## 2021-12-03 ASSESSMENT — ENCOUNTER SYMPTOMS
SWOLLEN GLANDS: 0
ABDOMINAL PAIN: 0
COUGH: 0
SINUS PRESSURE: 1
VISUAL CHANGE: 0
NAUSEA: 0
EYE ITCHING: 0
EYES NEGATIVE: 1
CHANGE IN BOWEL HABIT: 0
DIARRHEA: 0
WHEEZING: 0
SINUS PAIN: 0
RESPIRATORY NEGATIVE: 1
VOMITING: 0
SHORTNESS OF BREATH: 0
RHINORRHEA: 0
SORE THROAT: 1
EYE DISCHARGE: 0
EYE PAIN: 0
CHEST TIGHTNESS: 0

## 2021-12-14 ENCOUNTER — OFFICE VISIT (OUTPATIENT)
Dept: INTERNAL MEDICINE | Age: 41
End: 2021-12-14
Payer: COMMERCIAL

## 2021-12-14 VITALS
BODY MASS INDEX: 26.13 KG/M2 | HEART RATE: 66 BPM | WEIGHT: 157 LBS | SYSTOLIC BLOOD PRESSURE: 121 MMHG | DIASTOLIC BLOOD PRESSURE: 83 MMHG

## 2021-12-14 DIAGNOSIS — K21.9 GASTROESOPHAGEAL REFLUX DISEASE WITHOUT ESOPHAGITIS: ICD-10-CM

## 2021-12-14 DIAGNOSIS — R09.82 PND (POST-NASAL DRIP): Primary | ICD-10-CM

## 2021-12-14 DIAGNOSIS — I10 ESSENTIAL HYPERTENSION: ICD-10-CM

## 2021-12-14 DIAGNOSIS — F33.9 EPISODE OF RECURRENT MAJOR DEPRESSIVE DISORDER, UNSPECIFIED DEPRESSION EPISODE SEVERITY (HCC): ICD-10-CM

## 2021-12-14 PROCEDURE — 99213 OFFICE O/P EST LOW 20 MIN: CPT | Performed by: INTERNAL MEDICINE

## 2021-12-14 RX ORDER — FLUTICASONE PROPIONATE 50 MCG
2 SPRAY, SUSPENSION (ML) NASAL DAILY
Qty: 1 EACH | Refills: 3 | Status: SHIPPED | OUTPATIENT
Start: 2021-12-14 | End: 2022-10-27

## 2021-12-14 RX ORDER — CITALOPRAM 20 MG/1
20 TABLET ORAL DAILY
Qty: 90 TABLET | Refills: 3 | Status: SHIPPED | OUTPATIENT
Start: 2021-12-14 | End: 2022-03-14

## 2021-12-14 RX ORDER — OMEPRAZOLE 20 MG/1
20 CAPSULE, DELAYED RELEASE ORAL
Qty: 90 CAPSULE | Refills: 1 | Status: SHIPPED | OUTPATIENT
Start: 2021-12-14 | End: 2022-02-18 | Stop reason: ALTCHOICE

## 2021-12-14 NOTE — PROGRESS NOTES
[facility]      Date of Patient's Visit: 12/14/2021    Progress note    Patient Care Team:  Wojciech Levine MD as PCP - General (Internal Medicine)  Wojciech Levine MD as PCP - [facility] Empaneled Provider      CHIEF COMPLAINT  Chief Complaint   Patient presents with    Hypertension    Health Maintenance     had pap done, will update records, labs pended, decline vaccines       SUBJECTIVE  Leora Caldwell is a 39 y.o. female who presents for f/u on chronic medical problems     Depression and anxiety (slight bipolar cycles every 2-3 months) anxiety is well controlled but she feels depressed, reduced energy and pleasure     Bp well controlled     Recent COVID infection, now recovering       ROS  All other review of systems negative, except for those noted.     Review of Systems    Past Medical History:   Diagnosis Date    Abnormal Pap smear of cervix     Anxiety     Chest pain     pt states had normal stress test    Depression     Drug abuse (HCC)     opiates    Heartburn     HSV-2 infection     Hypertension     Menorrhagia     Osteoarthritis     Pneumonia due to Staphylococcus aureus (HCC)     PTSD (post-traumatic stress disorder)     Trichomonosis 4/18/16    on pap 4/18/16       Past Surgical History:   Procedure Laterality Date    ENDOMETRIAL ABLATION      OTHER SURGICAL HISTORY  06/21/2016    hysteroscopy with Novasure    TUBAL LIGATION      US BREAST NEEDLE BIOPSY LEFT Left 3/3/2021    US BREAST NEEDLE BIOPSY LEFT 3/3/2021 [facility] ULTR[facility]       Family History   Problem Relation Age of Onset    Other Mother         fibroid    Heart Disease Mother     Hypertension Mother     Thyroid Disease Mother     Heart Disease Father     Other Father         deaf and legally blind    Breast Cancer Paternal Aunt     Breast Cancer Paternal Aunt     Other Other         aunts & grandmother & cousins father's have vaginal bleeding  issues    Cancer Paternal Cousin 28 cervical       Social History     Socioeconomic History    Marital status: Single     Spouse name: None    Number of children: None    Years of education: None    Highest education level: None   Occupational History    None   Tobacco Use    Smoking status: Former Smoker     Packs/day: 0.50     Years: 18.00     Pack years: 9.00     Types: Cigarettes    Smokeless tobacco: Never Used   Vaping Use    Vaping Use: Never used   Substance and Sexual Activity    Alcohol use: Yes     Alcohol/week: 0.0 standard drinks     Comment:  3 x yearly    Drug use: Not Currently     Types: IV     Comment: recovery x 6.5 years    Sexual activity: Yes     Partners: Male     Birth control/protection: Surgical     Comment: ablation   Other Topics Concern    None   Social History Narrative    None     Social Determinants of Health     Financial Resource Strain: Low Risk     Difficulty of Paying Living Expenses: Not hard at all   Food Insecurity: No Food Insecurity    Worried About Running Out of Food in the Last Year: Never true    Willie of Food in the Last Year: Never true   Transportation Needs: No Transportation Needs    Lack of Transportation (Medical): No    Lack of Transportation (Non-Medical):  No   Physical Activity:     Days of Exercise per Week: Not on file    Minutes of Exercise per Session: Not on file   Stress:     Feeling of Stress : Not on file   Social Connections: Unknown    Frequency of Communication with Friends and Family: Not asked    Frequency of Social Gatherings with Friends and Family: Not asked    Attends Orthodox Services: Not asked    Active Member of Clubs or Organizations: Not asked    Attends Club or Organization Meetings: Not asked    Marital Status: Not asked   Intimate Partner Violence: Not At Risk    Fear of Current or Ex-Partner: No    Emotionally Abused: No    Physically Abused: No    Sexually Abused: No   Housing Stability:     Unable to Pay for Housing in the Last Year: Not on file    Number of Places Lived in the Last Year: Not on file    Unstable Housing in the Last Year: Not on file       Current Outpatient Medications   Medication Sig Dispense Refill    omeprazole (PRILOSEC) 20 MG delayed release capsule Take 1 capsule by mouth daily (with breakfast) 90 capsule 1    fluticasone (FLONASE) 50 MCG/ACT nasal spray 2 sprays by Nasal route daily 1 each 3    citalopram (CELEXA) 20 MG tablet Take 1 tablet by mouth daily 90 tablet 3    hydrOXYzine (ATARAX) 25 MG tablet TAKE ONE TABLET BY MOUTH THREE TIMES A DAY AS NEEDED FOR ITCHING 30 tablet 4    buPROPion (WELLBUTRIN XL) 300 MG extended release tablet TAKE ONE TABLET BY MOUTH EVERY MORNING 30 tablet 10    propranolol (INDERAL LA) 60 MG extended release capsule TAKE ONE CAPSULE BY MOUTH DAILY 30 capsule 10    lisinopril (PRINIVIL;ZESTRIL) 10 MG tablet TAKE ONE TABLET BY MOUTH DAILY 30 tablet 10    magnesium oxide (MAG-OX) 400 (241.3 Mg) MG TABS tablet Take 1 tablet by mouth daily 30 tablet 11    VITAMIN D PO Take by mouth       No current facility-administered medications for this visit. Allergies   Allergen Reactions    Codeine Other (See Comments)     Welts as a child    Morphine Other (See Comments)     hives    Sulfa Antibiotics Other (See Comments)     Welts after dying hair       PHYSICAL EXAM:   Vital Signs:   /83   Pulse 66   Wt 157 lb (71.2 kg)   BMI 26.13 kg/m²     BP Readings from Last 3 Encounters:   12/14/21 121/83   12/01/21 121/70   09/30/21 114/82        Wt Readings from Last 3 Encounters:   12/14/21 157 lb (71.2 kg)   09/30/21 150 lb (68 kg)   04/05/21 151 lb (68.5 kg)       Physical Exam  Cardiovascular:      Rate and Rhythm: Normal rate. Pulmonary:      Effort: Pulmonary effort is normal.   Abdominal:      General: Abdomen is flat. Musculoskeletal:         General: Normal range of motion. Skin:     General: Skin is warm. Neurological:      General: No focal deficit present. Mental Status: She is alert. Body mass index is 26.13 kg/m². RESULTS    Lab Findings    CBC:   Lab Results   Component Value Date    WBC 5.3 09/26/2019    HGB 14.7 09/26/2019     09/26/2019     BMP:   Lab Results   Component Value Date     10/08/2020    K 4.4 10/08/2020     10/08/2020    CO2 23 10/08/2020    BUN 16 10/08/2020    CREATININE 0.66 10/08/2020    GLUCOSE 84 10/08/2020     HEMOGLOBIN A1C:   Lab Results   Component Value Date    LABA1C 4.7 09/26/2019     MICROALBUMIN URINE: No results found for: MICROALBUR  FASTING LIPID PANEL:   Lab Results   Component Value Date    CHOL 176 02/05/2018    HDL 50 09/26/2019    TRIG 175 (H) 02/05/2018     Lab Results   Component Value Date    LDLCHOLESTEROL 100 09/26/2019     LIVER PROFILE:   Lab Results   Component Value Date    ALT 17 09/26/2019    AST 20 09/26/2019    PROT 7.8 09/26/2019    BILITOT 0.59 09/26/2019    LABALBU 4.9 09/26/2019      THYROID FUNCTION:   Lab Results   Component Value Date    TSH 1.44 12/11/2019      URINE ANALYSIS: No results found for: [address]    1. Gastroesophageal reflux disease without esophagitis    - omeprazole (PRILOSEC) 20 MG delayed release capsule; Take 1 capsule by mouth daily (with breakfast)  Dispense: 90 capsule; Refill: 1  - fluticasone (FLONASE) 50 MCG/ACT nasal spray; 2 sprays by Nasal route daily  Dispense: 1 each; Refill: 3    2. PND (post-nasal drip)      3. Episode of recurrent major depressive disorder, unspecified depression episode severity (HCC)    - citalopram (CELEXA) 20 MG tablet; Take 1 tablet by mouth daily  Dispense: 90 tablet; Refill: 3    4. Essential hypertension    - Basic Metabolic Panel; Future  - CBC With Auto Differential; Future            Follow up Instructions:    1. Return in about 3 months (around 3/14/2022). 2. Reviewed prior labs and health maintenance. 3. Discussed use, benefit, and side effects of prescribed medications.   Barriers to medication compliance addressed. All patient questions answered. Pt voiced understanding.      4. Patient given educational materials - see patient instructions      MD JERED Vale  Attending Physician, [address] [place] [address], Internal Medicine Residency Program  [address] [place] [address]  12/14/2021, 11:54 AM

## 2021-12-14 NOTE — PATIENT INSTRUCTIONS
Medications e-scribe to pharmacy of pt's choice. Script for lab given to pt, no fasting required. Pt will get labs done before next appt. An After Visit Summary was printed and given to the patient.   TERA

## 2021-12-16 ENCOUNTER — TELEPHONE (OUTPATIENT)
Dept: INTERNAL MEDICINE | Age: 41
End: 2021-12-16

## 2021-12-16 NOTE — TELEPHONE ENCOUNTER
----- Message from [place] sent at 12/16/2021  8:58 AM EST -----  Subject: Appointment Request    Reason for Call: Routine ED Follow Up Visit    QUESTIONS  Type of Appointment? Established Patient  Reason for appointment request? Available appointments did not meet   patient need  Additional Information for Provider? Pt is wanting to talk with someone   because she was seen for a panic/anxiety attack at ED on 12/15/21.   ---------------------------------------------------------------------------  --------------  Reniac  What is the best way for the office to contact you? OK to leave message on   voicemail  Preferred Call Back Phone Number? [phone]  ---------------------------------------------------------------------------  --------------  SCRIPT ANSWERS  Relationship to Patient? Self  Do you have any questions for your primary care provider that need to be   answered prior to your appointment? No  Have you been diagnosed with, awaiting test results for, or told that you   are suspected of having COVID-19 (Coronavirus)? (If patient has tested   negative or was tested as a requirement for work, school, or travel and   not based on symptoms, answer no)? No  Within the past two weeks have you developed any of the following symptoms   (answer no if symptoms have been present longer than 2 weeks or began   more than 2 weeks ago)? Fever or Chills, Cough, Shortness of breath or   difficulty breathing, Loss of taste or smell, Sore throat, Nasal   congestion, Sneezing or runny nose, Fatigue or generalized body aches   (answer no if pain is specific to a body part e.g. back pain), Diarrhea,   Headache? No  Have you had close contact with someone with COVID-19 in the last 14 days?    Yes

## 2021-12-28 ENCOUNTER — TELEPHONE (OUTPATIENT)
Dept: INTERNAL MEDICINE | Age: 41
End: 2021-12-28

## 2021-12-28 DIAGNOSIS — F41.1 GAD (GENERALIZED ANXIETY DISORDER): Primary | ICD-10-CM

## 2021-12-28 NOTE — TELEPHONE ENCOUNTER
Patient calling stating that she is going on vacation and is flying for 3 hours and would like something for her anxiety. Patient states that she has bad anxiety and she is afraid that she will have bad anxiety in the plane. Patient would like a phone call if medication can be sent to the pharmacy. Patient is leaving next Wednesday.

## 2022-01-04 RX ORDER — ALPRAZOLAM 0.25 MG
0.25 TABLET ORAL
Qty: 5 TABLET | Refills: 0 | Status: SHIPPED | OUTPATIENT
Start: 2022-01-04 | End: 2022-01-04

## 2022-02-16 ENCOUNTER — TELEPHONE (OUTPATIENT)
Dept: INTERNAL MEDICINE | Age: 42
End: 2022-02-16

## 2022-02-18 RX ORDER — OMEPRAZOLE 20 MG/1
20 CAPSULE, DELAYED RELEASE ORAL
Qty: 30 CAPSULE | Refills: 5 | Status: SHIPPED | OUTPATIENT
Start: 2022-02-18

## 2022-03-14 ENCOUNTER — HOSPITAL ENCOUNTER (OUTPATIENT)
Age: 42
Setting detail: SPECIMEN
Discharge: HOME | End: 2022-03-14

## 2022-03-14 ENCOUNTER — OFFICE VISIT (OUTPATIENT)
Dept: INTERNAL MEDICINE | Age: 42
End: 2022-03-14
Payer: COMMERCIAL

## 2022-03-14 VITALS
HEIGHT: 65 IN | BODY MASS INDEX: 26.82 KG/M2 | DIASTOLIC BLOOD PRESSURE: 80 MMHG | HEART RATE: 66 BPM | SYSTOLIC BLOOD PRESSURE: 130 MMHG | WEIGHT: 161 LBS

## 2022-03-14 DIAGNOSIS — E55.9 VITAMIN D DEFICIENCY: ICD-10-CM

## 2022-03-14 DIAGNOSIS — R60.9 BODY FLUID RETENTION: ICD-10-CM

## 2022-03-14 DIAGNOSIS — E87.1 HYPONATREMIA: ICD-10-CM

## 2022-03-14 LAB
ALBUMIN SERPL-MCNC: 4.4 G/DL (ref 3.5–5.2)
ALBUMIN/GLOBULIN RATIO: 1.8 (ref 1–2.5)
ALP BLD-CCNC: 73 U/L (ref 35–104)
ALT SERPL-CCNC: 15 U/L (ref 5–33)
ANION GAP SERPL CALCULATED.3IONS-SCNC: 15 MMOL/L (ref 9–17)
AST SERPL-CCNC: 33 U/L
BILIRUB SERPL-MCNC: 0.28 MG/DL (ref 0.3–1.2)
BUN BLDV-MCNC: 19 MG/DL (ref 6–20)
CALCIUM SERPL-MCNC: 9.1 MG/DL (ref 8.6–10.4)
CHLORIDE BLD-SCNC: 103 MMOL/L (ref 98–107)
CO2: 17 MMOL/L (ref 20–31)
CREAT SERPL-MCNC: 0.79 MG/DL (ref 0.5–0.9)
CREATININE URINE: 26.2 MG/DL (ref 28–217)
GFR AFRICAN AMERICAN: >60 ML/MIN
GFR NON-AFRICAN AMERICAN: >60 ML/MIN
GFR SERPL CREATININE-BSD FRML MDRD: ABNORMAL ML/MIN/{1.73_M2}
GLUCOSE BLD-MCNC: 96 MG/DL (ref 70–99)
POTASSIUM SERPL-SCNC: 5.3 MMOL/L (ref 3.7–5.3)
SODIUM BLD-SCNC: 135 MMOL/L (ref 135–144)
SODIUM,UR: 72 MMOL/L
TOTAL PROTEIN, URINE: <4 MG/DL
TOTAL PROTEIN: 6.9 G/DL (ref 6.4–8.3)
URINE TOTAL PROTEIN CREATININE RATIO: ABNORMAL (ref 0–0.2)
VITAMIN D 25-HYDROXY: 58 NG/ML

## 2022-03-14 PROCEDURE — 99213 OFFICE O/P EST LOW 20 MIN: CPT | Performed by: INTERNAL MEDICINE

## 2022-03-14 SDOH — ECONOMIC STABILITY: FOOD INSECURITY: WITHIN THE PAST 12 MONTHS, YOU WORRIED THAT YOUR FOOD WOULD RUN OUT BEFORE YOU GOT MONEY TO BUY MORE.: SOMETIMES TRUE

## 2022-03-14 SDOH — ECONOMIC STABILITY: FOOD INSECURITY: WITHIN THE PAST 12 MONTHS, THE FOOD YOU BOUGHT JUST DIDN'T LAST AND YOU DIDN'T HAVE MONEY TO GET MORE.: SOMETIMES TRUE

## 2022-03-14 ASSESSMENT — SOCIAL DETERMINANTS OF HEALTH (SDOH): HOW HARD IS IT FOR YOU TO PAY FOR THE VERY BASICS LIKE FOOD, HOUSING, MEDICAL CARE, AND HEATING?: NOT VERY HARD

## 2022-03-14 NOTE — PATIENT INSTRUCTIONS
Labs given to patient, they will have them done before their next visit.    Office will call pt to schedule in September to schedule next appointment      tv

## 2022-03-14 NOTE — PROGRESS NOTES
[facility]      Date of Patient's Visit: 3/14/2022    Progress note    Patient Care Team:  Ashli Leone MD as PCP - General (Internal Medicine)  Ashli Leone MD as PCP - [facility] Empaneled Provider      CHIEF COMPLAINT  Chief Complaint   Patient presents with    Hypertension    Health Maintenance     flu vaccine declined        Charley Alberto is a 39 y.o. female who presents for f/u   She had an episode of severe nausea, not feeling well. Labs done at that time in [facility] hospital ER showed hyponatremia   She was on celexa at that time which has since been discontinued       ROS  All other review of systems negative, except for those noted.     Review of Systems    Past Medical History:   Diagnosis Date    Abnormal Pap smear of cervix     Anxiety     Chest pain     pt states had normal stress test    Depression     Drug abuse (HCC)     opiates    Heartburn     HSV-2 infection     Hypertension     Menorrhagia     Osteoarthritis     Pneumonia due to Staphylococcus aureus (HCC)     PTSD (post-traumatic stress disorder)     Trichomonosis 4/18/16    on pap 4/18/16       Past Surgical History:   Procedure Laterality Date    ENDOMETRIAL ABLATION      OTHER SURGICAL HISTORY  06/21/2016    hysteroscopy with Novasure    TUBAL LIGATION      US BREAST NEEDLE BIOPSY LEFT Left 3/3/2021    US BREAST NEEDLE BIOPSY LEFT 3/3/2021 [facility] ULTRASOUND       Family History   Problem Relation Age of Onset    Other Mother         fibroid    Heart Disease Mother     Hypertension Mother     Thyroid Disease Mother     Heart Disease Father     Other Father         deaf and legally blind    Breast Cancer Paternal Aunt     Breast Cancer Paternal Aunt     Other Other         aunts & grandmother & cousins father's have vaginal bleeding  issues    Cancer Paternal Cousin 28        cervical       Social History     Socioeconomic History    Marital status: Single Spouse name: None    Number of children: None    Years of education: None    Highest education level: None   Occupational History    None   Tobacco Use    Smoking status: Former Smoker     Packs/day: 0.50     Years: 18.00     Pack years: 9.00     Types: Cigarettes    Smokeless tobacco: Never Used   Vaping Use    Vaping Use: Never used   Substance and Sexual Activity    Alcohol use: Yes     Alcohol/week: 0.0 standard drinks     Comment:  3 x yearly    Drug use: Not Currently     Types: IV     Comment: recovery x 6.5 years    Sexual activity: Yes     Partners: Male     Birth control/protection: Surgical     Comment: ablation   Other Topics Concern    None   Social History Narrative    None     Social Determinants of Health     Financial Resource Strain: Low Risk     Difficulty of Paying Living Expenses: Not very hard   Food Insecurity: Food Insecurity Present    Worried About Running Out of Food in the Last Year: Sometimes true    Willie of Food in the Last Year: Sometimes true   Transportation Needs:     Lack of Transportation (Medical): Not on file    Lack of Transportation (Non-Medical):  Not on file   Physical Activity:     Days of Exercise per Week: Not on file    Minutes of Exercise per Session: Not on file   Stress:     Feeling of Stress : Not on file   Social Connections:     Frequency of Communication with Friends and Family: Not on file    Frequency of Social Gatherings with Friends and Family: Not on file    Attends Pentecostalism Services: Not on file    Active Member of Clubs or Organizations: Not on file    Attends Club or Organization Meetings: Not on file    Marital Status: Not on file   Intimate Partner Violence:     Fear of Current or Ex-Partner: Not on file    Emotionally Abused: Not on file    Physically Abused: Not on file    Sexually Abused: Not on file   Housing Stability:     Unable to Pay for Housing in the Last Year: Not on file    Number of [place] in the Last Year: Not on file    Unstable Housing in the Last Year: Not on file       Current Outpatient Medications   Medication Sig Dispense Refill    omeprazole (PRILOSEC) 20 MG delayed release capsule Take 1 capsule by mouth every morning (before breakfast) 30 capsule 5    fluticasone (FLONASE) 50 MCG/ACT nasal spray 2 sprays by Nasal route daily 1 each 3    hydrOXYzine (ATARAX) 25 MG tablet TAKE ONE TABLET BY MOUTH THREE TIMES A DAY AS NEEDED FOR ITCHING 30 tablet 4    buPROPion (WELLBUTRIN XL) 300 MG extended release tablet TAKE ONE TABLET BY MOUTH EVERY MORNING 30 tablet 10    propranolol (INDERAL LA) 60 MG extended release capsule TAKE ONE CAPSULE BY MOUTH DAILY 30 capsule 10    lisinopril (PRINIVIL;ZESTRIL) 10 MG tablet TAKE ONE TABLET BY MOUTH DAILY 30 tablet 10    magnesium oxide (MAG-OX) 400 (241.3 Mg) MG TABS tablet Take 1 tablet by mouth daily 30 tablet 11    VITAMIN D PO Take by mouth       No current facility-administered medications for this visit. Allergies   Allergen Reactions    Codeine Other (See Comments)     Welts as a child    Morphine Other (See Comments)     hives    Sulfa Antibiotics Other (See Comments)     Welts after dying hair       PHYSICAL EXAM:   Vital Signs:   /80 (Site: Right Upper Arm, Position: Sitting, Cuff Size: Medium Adult)   Pulse 66   Ht 5' 5\" (1.651 m)   Wt 161 lb (73 kg)   BMI 26.79 kg/m²     BP Readings from Last 3 Encounters:   03/14/22 130/80   12/14/21 121/83   12/01/21 121/70        Wt Readings from Last 3 Encounters:   03/14/22 161 lb (73 kg)   12/14/21 157 lb (71.2 kg)   09/30/21 150 lb (68 kg)       Physical Exam    Body mass index is 26.79 kg/m².     RESULTS    Lab Findings    CBC:   Lab Results   Component Value Date    WBC 5.3 09/26/2019    HGB 14.7 09/26/2019     09/26/2019     BMP:   Lab Results   Component Value Date     10/08/2020    K 4.4 10/08/2020     10/08/2020    CO2 23 10/08/2020    BUN 16 10/08/2020    CREATININE

## 2022-03-29 ENCOUNTER — HOSPITAL ENCOUNTER (OUTPATIENT)
Dept: MAMMOGRAPHY | Age: 42
Discharge: HOME | End: 2022-03-31
Payer: COMMERCIAL

## 2022-03-29 DIAGNOSIS — Z12.31 ENCOUNTER FOR SCREENING MAMMOGRAM FOR MALIGNANT NEOPLASM OF BREAST: ICD-10-CM

## 2022-03-29 PROCEDURE — 77063 BREAST TOMOSYNTHESIS BI: CPT

## 2022-04-08 DIAGNOSIS — J01.90 ACUTE BACTERIAL SINUSITIS: ICD-10-CM

## 2022-04-08 DIAGNOSIS — B96.89 ACUTE BACTERIAL SINUSITIS: ICD-10-CM

## 2022-04-08 RX ORDER — AZITHROMYCIN 250 MG/1
TABLET, FILM COATED ORAL
Qty: 6 TABLET | Refills: 0 | OUTPATIENT
Start: 2022-04-08

## 2022-04-08 NOTE — TELEPHONE ENCOUNTER
Request for Hydroxyzine .       Next Visit Date:  Future Appointments   Date Time Provider Paul Daniels   5/10/2022 11:30 AM Jeffrey Lindsay, APRN - CNM [facility] [address] Maintenance   Topic Date Due    COVID-19 Vaccine (1) Never done    Cervical cancer screen  11/27/2021    Depression Monitoring  04/05/2022    Flu vaccine (Season Ended) 03/14/2023 (Originally 9/1/2022)    Potassium monitoring  03/14/2023    Creatinine monitoring  03/14/2023    Lipid screen  09/26/2024    DTaP/Tdap/Td vaccine (2 - Td or Tdap) 10/07/2026    Pneumococcal 0-64 years Vaccine (2 of 2 - PPSV23) 04/14/2045    Hepatitis C screen  Completed    HIV screen  Completed    Hepatitis A vaccine  Aged Out    Hepatitis B vaccine  Aged Out    Hib vaccine  Aged Out    Meningococcal (ACWY) vaccine  Aged Out    Varicella vaccine  Discontinued       Hemoglobin A1C (%)   Date Value   09/26/2019 4.7   02/05/2018 4.4   03/30/2016 5.2             ( goal A1C is < 7)   No results found for: LABMICR  LDL Cholesterol (mg/dL)   Date Value   09/26/2019 100       (goal LDL is <100)   AST (U/L)   Date Value   03/14/2022 33 (H)     ALT (U/L)   Date Value   03/14/2022 15     BUN (mg/dL)   Date Value   03/14/2022 19     BP Readings from Last 3 Encounters:   03/14/22 130/80   12/14/21 121/83   12/01/21 121/70          (goal 120/80)    All Future Testing planned in [facility]  Lab Frequency Next Occurrence   Sodium, Urine, Random Once 05/22/2022   Creatinine, Random Urine Once 05/22/2022   Protein / Creatinine Ratio, Urine Once 05/22/2022         Patient Active Problem List:     HUE (generalized anxiety disorder)     Panic disorder     History of heroin abuse ([address]     Essential hypertension     Left breast mass     Breast fibroadenoma, left

## 2022-04-12 RX ORDER — HYDROXYZINE HYDROCHLORIDE 25 MG/1
TABLET, FILM COATED ORAL
Qty: 30 TABLET | Refills: 4 | Status: SHIPPED | OUTPATIENT
Start: 2022-04-12 | End: 2022-08-03

## 2022-04-26 DIAGNOSIS — K21.9 GASTROESOPHAGEAL REFLUX DISEASE WITHOUT ESOPHAGITIS: ICD-10-CM

## 2022-04-26 RX ORDER — MAGNESIUM OXIDE TAB 400 MG (241.3 MG ELEMENTAL MG) 400 (241.3 MG) MG
TAB ORAL
Qty: 30 TABLET | Refills: 11 | Status: SHIPPED | OUTPATIENT
Start: 2022-04-26

## 2022-04-26 NOTE — TELEPHONE ENCOUNTER
Refill request for MAGNESIUM-OXIDE 400 (240 Mg) MG tablet. If appropriate please send medication(s) to patients pharmacy. Next appt: Patient is currently on wait list for provider.     Last appt: 3/14/2022    Health Maintenance   Topic Date Due    COVID-19 Vaccine (1) Never done    Cervical cancer screen  11/27/2021    Depression Monitoring  04/05/2022    Flu vaccine (Season Ended) 03/14/2023 (Originally 9/1/2022)    Potassium  03/14/2023    Creatinine  03/14/2023    Lipids  09/26/2024    DTaP/Tdap/Td vaccine (2 - Td or Tdap) 10/07/2026    Hepatitis C screen  Completed    HIV screen  Completed    Hepatitis A vaccine  Aged Out    Hepatitis B vaccine  Aged Out    Hib vaccine  Aged Out    Meningococcal (ACWY) vaccine  Aged Out    Pneumococcal 0-64 years Vaccine  Aged Out    Varicella vaccine  Discontinued       Hemoglobin A1C (%)   Date Value   09/26/2019 4.7   02/05/2018 4.4   03/30/2016 5.2             ( goal A1C is < 7)   No results found for: LABMICR  LDL Cholesterol (mg/dL)   Date Value   09/26/2019 100       (goal LDL is <100)   AST (U/L)   Date Value   03/14/2022 33 (H)     ALT (U/L)   Date Value   03/14/2022 15     BUN (mg/dL)   Date Value   03/14/2022 19     BP Readings from Last 3 Encounters:   03/14/22 130/80   12/14/21 121/83   12/01/21 121/70          (goal 120/80)          Patient Active Problem List:     HUE (generalized anxiety disorder)     Panic disorder     History of heroin abuse ([facility] [address]     Essential hypertension     Left breast mass     Breast fibroadenoma, left

## 2022-06-07 DIAGNOSIS — F41.1 GAD (GENERALIZED ANXIETY DISORDER): ICD-10-CM

## 2022-06-07 NOTE — TELEPHONE ENCOUNTER
Refill request for ALPRAZolam (XANAX) 0.25 MG tablet. If appropriate please send medication(s) to patients pharmacy. Next appt: Patient is currently on wait list for provider.     Last appt: 3/14/2022    Health Maintenance   Topic Date Due    COVID-19 Vaccine (1) Never done    Cervical cancer screen  11/27/2021    Depression Monitoring  04/05/2022    Flu vaccine (Season Ended) 03/14/2023 (Originally 9/1/2022)    Lipids  09/26/2024    DTaP/Tdap/Td vaccine (2 - Td or Tdap) 10/07/2026    Hepatitis C screen  Completed    HIV screen  Completed    Hepatitis A vaccine  Aged Out    Hepatitis B vaccine  Aged Out    Hib vaccine  Aged Out    Meningococcal (ACWY) vaccine  Aged Out    Pneumococcal 0-64 years Vaccine  Aged Out    Varicella vaccine  Discontinued       Hemoglobin A1C (%)   Date Value   09/26/2019 4.7   02/05/2018 4.4   03/30/2016 5.2             ( goal A1C is < 7)   No results found for: LABMICR  LDL Cholesterol (mg/dL)   Date Value   09/26/2019 100       (goal LDL is <100)   AST (U/L)   Date Value   03/14/2022 33 (H)     ALT (U/L)   Date Value   03/14/2022 15     BUN (mg/dL)   Date Value   03/14/2022 19     BP Readings from Last 3 Encounters:   03/14/22 130/80   12/14/21 121/83   12/01/21 121/70          (goal 120/80)          Patient Active Problem List:     HUE (generalized anxiety disorder)     Panic disorder     History of heroin abuse ([facility] [address]     Essential hypertension     Left breast mass     Breast fibroadenoma, left

## 2022-07-06 ENCOUNTER — TELEPHONE (OUTPATIENT)
Dept: INTERNAL MEDICINE | Age: 42
End: 2022-07-06

## 2022-07-06 NOTE — TELEPHONE ENCOUNTER
----- Message from Dante Russ sent at 7/6/2022 11:23 AM EDT -----  Subject: Referral Request    Reason for referral request? Pain in bottom right foot (did exercise   barefoot on cement - pulled something not sure), also R leg pain   associating with Hips   Provider patient wants to be referred to(if known):     Provider Phone Number(if known): Additional Information for Provider? Prefers to go to the [facility] on   [place]. But if not any other place would be okay. Hip Pain going on for   years and the foot going on at least 2 months. She has been icing it and   nothing is helping. Please call when referral is ready.   ---------------------------------------------------------------------------  --------------  Jessica NEWMAN    [phone]; OK to leave message on voicemail  ---------------------------------------------------------------------------  --------------

## 2022-07-12 ENCOUNTER — TELEPHONE (OUTPATIENT)
Dept: INTERNAL MEDICINE | Age: 42
End: 2022-07-12

## 2022-07-12 NOTE — LETTER
[address]  [address] [address]  Phone: [phone]  Fax: [phone]    Ammy Mckeon MD        July 12, 2022     [address]  [facility] [address]      Dear [facility]: We missed seeing you for a scheduled appointment at [address] [place] [address] with Ammy Mckeon MD on 7/12/2022. We're sorry you were unable to keep your appointment and hope that you are doing well. We ask that you please call 24 hours in advance if you are unable to make your appointment, so that we can give that time to another patient in need. We care about you and the management of your healthcare and want to make sure that you follow up as recommended. To provide quality care and timely appointments to all our patients, you may be dismissed from the practice if you do not show for three (3) scheduled appointments within a 12-month period. We would like to continue treating your healthcare needs. Please call the office to reschedule your appointment, if needed.      Sincerely,        Ammy Mckeon MD

## 2022-07-15 ENCOUNTER — TELEMEDICINE (OUTPATIENT)
Dept: INTERNAL MEDICINE | Age: 42
End: 2022-07-15
Payer: COMMERCIAL

## 2022-07-15 DIAGNOSIS — M79.671 RIGHT FOOT PAIN: Primary | ICD-10-CM

## 2022-07-15 DIAGNOSIS — F41.0 PANIC ATTACK: ICD-10-CM

## 2022-07-15 PROCEDURE — 99443 PR PHYS/QHP TELEPHONE EVALUATION 21-30 MIN: CPT | Performed by: INTERNAL MEDICINE

## 2022-07-15 NOTE — PROGRESS NOTES
7/15/2022    TELEHEALTH EVALUATION -- Audio(During JUU-12 public health emergency)    Patient is evaluated via telephone on 2020. Consent:  The patient and/or health care decision maker is aware that that he may receive a bill for this telephone service, depending on his insurance coverage, and has provided verbal consent to proceed: Yes    HPI:    Danni Sanchez (:  1980) has requested an audio evaluation for the following concern(s): Foot pain under the ball of the foot,   Severe, muscular and sharp     Panic attacks : she feels that it is a side effect of wellbutyrin. She feels better now that she has weaned herself off the medication     Review of Systems    Prior to Visit Medications    Medication Sig Taking? Authorizing Provider   MAGNESIUM-OXIDE 400 (240 Mg) MG tablet TAKE ONE TABLET BY MOUTH DAILY  Asia Amaya MD   hydrOXYzine (ATARAX) 25 MG tablet TAKE ONE TABLET BY MOUTH THREE TIMES A DAY AS NEEDED FOR ITCHING  Mariama Wyman MD   omeprazole (PRILOSEC) 20 MG delayed release capsule Take 1 capsule by mouth every morning (before breakfast)  Mariama Wyman MD   fluticasone (FLONASE) 50 MCG/ACT nasal spray 2 sprays by Nasal route daily  Mariama Wyman MD   buPROPion (WELLBUTRIN XL) 300 MG extended release tablet TAKE ONE TABLET BY MOUTH EVERY MORNING  Mariama Wyman MD   propranolol (INDERAL LA) 60 MG extended release capsule TAKE ONE CAPSULE BY MOUTH DAILY  Mariama Wyman MD   lisinopril (PRINIVIL;ZESTRIL) 10 MG tablet TAKE ONE TABLET BY MOUTH DAILY  Mariama Wyman MD   VITAMIN D PO Take by mouth  Historical Provider, MD       Social History     Tobacco Use    Smoking status: Former     Packs/day: 0.50     Years: 18.00     Pack years: 9.00     Types: Cigarettes    Smokeless tobacco: Never   Vaping Use    Vaping Use: Never used   Substance Use Topics    Alcohol use:  Yes     Alcohol/week: 0.0 standard drinks     Comment:  3 x yearly    Drug use: Not Currently     Types: IV Comment: recovery x 6.5 years            RECORD REVIEW: Previous medical records were reviewed at today's visit. PHYSICAL EXAMINATION:    Vital Signs: (As obtained by patient/caregiver at home)      Constitutional: [] Appears well-developed and well-nourished [] No apparent distress      [] Abnormal   Mental status  [] Alert and awake  [] Oriented to person/place/time []Able to follow commands        Pulmonary/Chest: [] Respiratory effort normal.  [] No visualized signs of difficulty breathing or respiratory distress        [] Abnormal              Psychiatric:       [] Normal [] Abnormal        [] No Hallucinations    Other pertinent observable physical exam findings:-              RECORD REVIEW: Previous medical records were reviewed at today's visit. The past family, medical and social histories were reviewed and unchanged with the exceptions of what is mentioned in this note. Due to this being a TeleHealth encounter, evaluation of the following organ systems is limited: Vitals/Constitutional/EENT/Resp/CV/GI//MS/Neuro/Skin/Heme-Lymph-Imm. ASSESSMENT/PLAN:  1. Right foot pain   Arch supporting foot wear. Avoid walking bare feet   Ibuprofen, ice application and rest     - [address]    2. Panic attack  Discontinue wellbutyrin   She is already feeling better will continue to monitor       Total Time spent 25 min     No follow-ups on file. An  electronic signature was used to authenticate this note. --Collette Hahn, MD on 7/15/2022 at 10:48 AM    9}    Pursuant to the emergency declaration under the [facility], [facility] waiver authority and the [employer] Act, this Virtual  Visit was conducted, with patient's consent, to reduce the patient's risk of exposure to COVID-19 and provide continuity of care for an established patient.     Services were provided through a telephone discussion virtually to substitute for in-person clinic visit.

## 2022-07-18 RX ORDER — ALPRAZOLAM 0.25 MG/1
TABLET ORAL
Qty: 5 TABLET | OUTPATIENT
Start: 2022-07-18

## 2022-07-24 DIAGNOSIS — F41.1 GAD (GENERALIZED ANXIETY DISORDER): ICD-10-CM

## 2022-07-25 NOTE — TELEPHONE ENCOUNTER
Request for Inderal.      Next Visit Date:  Future Appointments   Date Time Provider Paul Frances   7/29/2022  1:15 PM Kervin Montelongo DPM [facility] Podiatry [facility] [place]   8/23/2022 10:40 AM Mi Ogden MD [facility] IM [address] Maintenance   Topic Date Due    COVID-19 Vaccine (1) Never done    Cervical cancer screen  11/27/2021    Depression Monitoring  04/05/2022    Flu vaccine (1) 09/01/2022    Lipids  09/26/2024    DTaP/Tdap/Td vaccine (2 - Td or Tdap) 10/07/2026    Hepatitis C screen  Completed    HIV screen  Completed    Hepatitis A vaccine  Aged Out    Hepatitis B vaccine  Aged Out    Hib vaccine  Aged Out    Meningococcal (ACWY) vaccine  Aged Out    Pneumococcal 0-64 years Vaccine  Aged Out    Varicella vaccine  Discontinued       Hemoglobin A1C (%)   Date Value   09/26/2019 4.7   02/05/2018 4.4   03/30/2016 5.2             ( goal A1C is < 7)   No results found for: LABMICR  LDL Cholesterol (mg/dL)   Date Value   09/26/2019 100       (goal LDL is <100)   AST (U/L)   Date Value   03/14/2022 33 (H)     ALT (U/L)   Date Value   03/14/2022 15     BUN (mg/dL)   Date Value   03/14/2022 19     BP Readings from Last 3 Encounters:   03/14/22 130/80   12/14/21 121/83   12/01/21 121/70          (goal 120/80)    All Future Testing planned in [facility]        Patient Active Problem List:     HUE (generalized anxiety disorder)     Panic disorder     History of heroin abuse ([facility] [address]     Essential hypertension     Left breast mass     Breast fibroadenoma, left

## 2022-07-26 RX ORDER — PROPRANOLOL HCL 60 MG
CAPSULE, EXTENDED RELEASE 24HR ORAL
Qty: 30 CAPSULE | Refills: 10 | Status: SHIPPED | OUTPATIENT
Start: 2022-07-26

## 2022-08-03 DIAGNOSIS — I10 ESSENTIAL HYPERTENSION: ICD-10-CM

## 2022-08-03 RX ORDER — LISINOPRIL 10 MG/1
TABLET ORAL
Qty: 30 TABLET | Refills: 1 | Status: SHIPPED | OUTPATIENT
Start: 2022-08-03 | End: 2022-10-05 | Stop reason: SDUPTHER

## 2022-08-03 RX ORDER — HYDROXYZINE HYDROCHLORIDE 25 MG/1
TABLET, FILM COATED ORAL
Qty: 30 TABLET | Refills: 0 | Status: SHIPPED | OUTPATIENT
Start: 2022-08-03 | End: 2022-09-06 | Stop reason: SDUPTHER

## 2022-08-03 NOTE — TELEPHONE ENCOUNTER
E-scribing request for 2 medications . Pt has future appt.        Health Maintenance   Topic Date Due    COVID-19 Vaccine (1) Never done    Cervical cancer screen  11/27/2021    Depression Monitoring  04/05/2022    Flu vaccine (1) 09/01/2022    Lipids  09/26/2024    DTaP/Tdap/Td vaccine (2 - Td or Tdap) 10/07/2026    Hepatitis C screen  Completed    HIV screen  Completed    Hepatitis A vaccine  Aged Out    Hepatitis B vaccine  Aged Out    Hib vaccine  Aged Out    Meningococcal (ACWY) vaccine  Aged Out    Pneumococcal 0-64 years Vaccine  Aged Out    Varicella vaccine  Discontinued             (applicable per patient's age: Cancer Screenings, Depression Screening, Fall Risk Screening, Immunizations)    Hemoglobin A1C (%)   Date Value   09/26/2019 4.7   02/05/2018 4.4   03/30/2016 5.2     LDL Cholesterol (mg/dL)   Date Value   09/26/2019 100     AST (U/L)   Date Value   03/14/2022 33 (H)     ALT (U/L)   Date Value   03/14/2022 15     BUN (mg/dL)   Date Value   03/14/2022 19      (goal A1C is < 7)   (goal LDL is <100) need 30-50% reduction from baseline     BP Readings from Last 3 Encounters:   03/14/22 130/80   12/14/21 121/83   12/01/21 121/70    (goal /80)      All Future Testing planned in [facility]:      Next Visit Date:  Future Appointments   Date Time Provider Paul Daniels   8/3/2022  1:45 PM Sixto Covarrubias DPM [facility] Podiatry [address] [facility]   8/23/2022 10:40 AM Yaniv Easton MD [facility]            Patient Active Problem List:     HUE (generalized anxiety disorder)     Panic disorder     History of heroin abuse ([facility] [address]     Essential hypertension     Left breast mass     Breast fibroadenoma, left

## 2022-09-02 NOTE — TELEPHONE ENCOUNTER
Request for Hydroxyzine. Next Visit Date:  No future appointments.     Health Maintenance   Topic Date Due    COVID-19 Vaccine (1) Never done    Cervical cancer screen  11/27/2021    Depression Monitoring  04/05/2022    Flu vaccine (1) 09/01/2022    Lipids  09/26/2024    DTaP/Tdap/Td vaccine (2 - Td or Tdap) 10/07/2026    Hepatitis C screen  Completed    HIV screen  Completed    Hepatitis A vaccine  Aged Out    Hepatitis B vaccine  Aged Out    Hib vaccine  Aged Out    Meningococcal (ACWY) vaccine  Aged Out    Pneumococcal 0-64 years Vaccine  Aged Out    Varicella vaccine  Discontinued       Hemoglobin A1C (%)   Date Value   09/26/2019 4.7   02/05/2018 4.4   03/30/2016 5.2             ( goal A1C is < 7)   No results found for: LABMICR  LDL Cholesterol (mg/dL)   Date Value   09/26/2019 100       (goal LDL is <100)   AST (U/L)   Date Value   03/14/2022 33 (H)     ALT (U/L)   Date Value   03/14/2022 15     BUN (mg/dL)   Date Value   03/14/2022 19     BP Readings from Last 3 Encounters:   03/14/22 130/80   12/14/21 121/83   12/01/21 121/70          (goal 120/80)    All Future Testing planned in [facility]        Patient Active Problem List:     HUE (generalized anxiety disorder)     Panic disorder     History of heroin abuse ([facility] [address]     Essential hypertension     Left breast mass     Breast fibroadenoma, left

## 2022-09-06 RX ORDER — HYDROXYZINE HYDROCHLORIDE 25 MG/1
TABLET, FILM COATED ORAL
Qty: 30 TABLET | Refills: 0 | Status: SHIPPED | OUTPATIENT
Start: 2022-09-06 | End: 2022-10-03

## 2022-10-03 RX ORDER — HYDROXYZINE HYDROCHLORIDE 25 MG/1
TABLET, FILM COATED ORAL
Qty: 30 TABLET | Refills: 0 | Status: SHIPPED | OUTPATIENT
Start: 2022-10-03 | End: 2022-11-01

## 2022-10-05 DIAGNOSIS — I10 ESSENTIAL HYPERTENSION: ICD-10-CM

## 2022-10-05 RX ORDER — LISINOPRIL 10 MG/1
10 TABLET ORAL DAILY
Qty: 90 TABLET | Refills: 3 | Status: SHIPPED | OUTPATIENT
Start: 2022-10-05

## 2022-10-05 NOTE — TELEPHONE ENCOUNTER
Refill request for lisinopril (PRINIVIL;ZESTRIL) 10 MG tablet . If appropriate please send medication(s) to patients pharmacy.     Next appt: 11/8/2022      Health Maintenance   Topic Date Due    COVID-19 Vaccine (1) Never done    Cervical cancer screen  11/27/2021    Depression Monitoring  04/05/2022    Flu vaccine (1) 08/01/2022    Lipids  09/26/2024    DTaP/Tdap/Td vaccine (2 - Td or Tdap) 10/07/2026    Hepatitis C screen  Completed    HIV screen  Completed    Hepatitis A vaccine  Aged Out    Hepatitis B vaccine  Aged Out    Hib vaccine  Aged Out    Meningococcal (ACWY) vaccine  Aged Out    Pneumococcal 0-64 years Vaccine  Aged Out    Varicella vaccine  Discontinued       Hemoglobin A1C (%)   Date Value   09/26/2019 4.7   02/05/2018 4.4   03/30/2016 5.2             ( goal A1C is < 7)   No results found for: LABMICR  LDL Cholesterol (mg/dL)   Date Value   09/26/2019 100       (goal LDL is <100)   AST (U/L)   Date Value   03/14/2022 33 (H)     ALT (U/L)   Date Value   03/14/2022 15     BUN (mg/dL)   Date Value   03/14/2022 19     BP Readings from Last 3 Encounters:   03/14/22 130/80   12/14/21 121/83   12/01/21 121/70          (goal 120/80)          Patient Active Problem List:     HUE (generalized anxiety disorder)     Panic disorder     History of heroin abuse ([facility] [address]     Essential hypertension     Left breast mass     Breast fibroadenoma, left

## 2022-10-05 NOTE — TELEPHONE ENCOUNTER
----- Message from Varsha Quintero sent at 10/5/2022  4:12 PM EDT -----  Subject: Refill Request    QUESTIONS  Name of Medication? lisinopril (PRINIVIL;ZESTRIL) 10 MG tablet  Patient-reported dosage and instructions? 1 tablet  How many days do you have left? 0  Preferred Pharmacy? [facility] [id number]  Pharmacy phone number (if available)? [phone]  Additional Information for Provider? Juanito hasn't taken her medication   since 9/29 and is experiencing a headache. She stated the pharmacy will   not refill the medication. Note? Our system is showing 1 refill still   available.  ---------------------------------------------------------------------------  --------------  CALL BACK INFO  What is the best way for the office to contact you? OK to leave message on   voicemail, OK to respond with electronic message via [facility] portal (only   for patients who have registered [employer] account)  Preferred Call Back Phone Number? [phone]  ---------------------------------------------------------------------------  --------------  SCRIPT ANSWERS  Relationship to Patient?  Self

## 2022-10-26 DIAGNOSIS — K21.9 GASTROESOPHAGEAL REFLUX DISEASE WITHOUT ESOPHAGITIS: ICD-10-CM

## 2022-10-26 NOTE — TELEPHONE ENCOUNTER
Request for flonase.       Next Visit Date:11/8/22  Future Appointments   Date Time Provider Paul Daniels   11/8/2022  8:40 AM Lucho Gimenez MD [facility]   Topic Date Due    COVID-19 Vaccine (1) Never done    Cervical cancer screen  11/27/2021    Depression Monitoring  04/05/2022    Flu vaccine (1) 08/01/2022    Lipids  09/26/2024    DTaP/Tdap/Td vaccine (2 - Td or Tdap) 10/07/2026    Hepatitis C screen  Completed    HIV screen  Completed    Hepatitis A vaccine  Aged Out    Hib vaccine  Aged Out    Meningococcal (ACWY) vaccine  Aged Out    Pneumococcal 0-64 years Vaccine  Aged Out    Varicella vaccine  Discontinued       Hemoglobin A1C (%)   Date Value   09/26/2019 4.7   02/05/2018 4.4   03/30/2016 5.2             ( goal A1C is < 7)   No results found for: LABMICR  LDL Cholesterol (mg/dL)   Date Value   09/26/2019 100       (goal LDL is <100)   AST (U/L)   Date Value   03/14/2022 33 (H)     ALT (U/L)   Date Value   03/14/2022 15     BUN (mg/dL)   Date Value   03/14/2022 19     BP Readings from Last 3 Encounters:   03/14/22 130/80   12/14/21 121/83   12/01/21 121/70          (goal 120/80)    All Future Testing planned in [facility]        Patient Active Problem List:     HUE (generalized anxiety disorder)     Panic disorder     History of heroin abuse ([facility] [address]     Essential hypertension     Left breast mass     Breast fibroadenoma, left

## 2022-10-27 RX ORDER — FLUTICASONE PROPIONATE 50 MCG
SPRAY, SUSPENSION (ML) NASAL
Qty: 1 EACH | Refills: 2 | Status: SHIPPED | OUTPATIENT
Start: 2022-10-27

## 2022-10-31 NOTE — TELEPHONE ENCOUNTER
Refill request for hydrOXYzine HCl (ATARAX) 25 MG tablet. If appropriate please send medication(s) to patients pharmacy.     Next appt: 11/8/2022    Health Maintenance   Topic Date Due    COVID-19 Vaccine (1) Never done    Cervical cancer screen  11/27/2021    Depression Monitoring  04/05/2022    Flu vaccine (1) 08/01/2022    Lipids  09/26/2024    DTaP/Tdap/Td vaccine (2 - Td or Tdap) 10/07/2026    Hepatitis C screen  Completed    HIV screen  Completed    Hepatitis A vaccine  Aged Out    Hib vaccine  Aged Out    Meningococcal (ACWY) vaccine  Aged Out    Pneumococcal 0-64 years Vaccine  Aged Out    Varicella vaccine  Discontinued       Hemoglobin A1C (%)   Date Value   09/26/2019 4.7   02/05/2018 4.4   03/30/2016 5.2             ( goal A1C is < 7)   No results found for: LABMICR  LDL Cholesterol (mg/dL)   Date Value   09/26/2019 100       (goal LDL is <100)   AST (U/L)   Date Value   03/14/2022 33 (H)     ALT (U/L)   Date Value   03/14/2022 15     BUN (mg/dL)   Date Value   03/14/2022 19     BP Readings from Last 3 Encounters:   03/14/22 130/80   12/14/21 121/83   12/01/21 121/70          (goal 120/80)          Patient Active Problem List:     HUE (generalized anxiety disorder)     Panic disorder     History of heroin abuse ([facility] [address]     Essential hypertension     Left breast mass     Breast fibroadenoma, left

## 2022-11-01 RX ORDER — HYDROXYZINE HYDROCHLORIDE 25 MG/1
TABLET, FILM COATED ORAL
Qty: 30 TABLET | Refills: 0 | Status: SHIPPED | OUTPATIENT
Start: 2022-11-01 | End: 2022-12-01

## 2022-11-08 ENCOUNTER — TELEMEDICINE (OUTPATIENT)
Dept: INTERNAL MEDICINE | Age: 42
End: 2022-11-08
Payer: COMMERCIAL

## 2022-11-08 DIAGNOSIS — N64.4 BREAST PAIN: ICD-10-CM

## 2022-11-08 DIAGNOSIS — F17.200 SMOKER: ICD-10-CM

## 2022-11-08 DIAGNOSIS — F33.9 EPISODE OF RECURRENT MAJOR DEPRESSIVE DISORDER, UNSPECIFIED DEPRESSION EPISODE SEVERITY (HCC): Primary | ICD-10-CM

## 2022-11-08 PROCEDURE — 99443 PR PHYS/QHP TELEPHONE EVALUATION 21-30 MIN: CPT | Performed by: INTERNAL MEDICINE

## 2022-11-08 RX ORDER — CHOLECALCIFEROL (VITAMIN D3) 125 MCG
1 CAPSULE ORAL DAILY
Qty: 90 CAPSULE | Refills: 3 | Status: SHIPPED | OUTPATIENT
Start: 2022-11-08

## 2022-11-08 RX ORDER — OMEPRAZOLE 20 MG/1
20 CAPSULE, DELAYED RELEASE ORAL 2 TIMES DAILY
Qty: 90 CAPSULE | Refills: 5 | Status: SHIPPED | OUTPATIENT
Start: 2022-11-08

## 2022-11-08 NOTE — PROGRESS NOTES
2022    TELEHEALTH EVALUATION -- Audio(During QGUHX-33 public health emergency)    Patient is evaluated via telephone on 2020. Consent:  The patient and/or health care decision maker is aware that that he may receive a bill for this telephone service, depending on his insurance coverage, and has provided verbal consent to proceed: Yes    HPI:    Lev Dubose (:  1980) has requested an audio evaluation for the following concern(s):    Extreme fatigue and recurrence of depression : she has stopped taking wellbutyrin and does not want another medication at this time     Smoker : she was trying to quit but now is craving for more smoke at all times     Breast pain : right side acute radiates to the arm pit, mild to moderate discomfort. No palpable lumps as per the patient     Review of Systems    Prior to Visit Medications    Medication Sig Taking?  Authorizing Provider   Cholecalciferol (VITAMIN D) 125 MCG (5000 UT) CAPS Take 1 tablet by mouth daily Yes Ariella Miller MD   omeprazole (PRILOSEC) 20 MG delayed release capsule Take 1 capsule by mouth in the morning and at bedtime Yes Ariella Miller MD   hydrOXYzine HCl (ATARAX) 25 MG tablet TAKE ONE TABLET BY MOUTH THREE TIMES A DAY AS NEEDED FOR ITCHING Yes Ariella Miller MD   fluticasone (FLONASE) 50 MCG/ACT nasal spray SPRAY TWO SPRAYS IN EACH NOSTRIL ONCE DAILY Yes Terrell Sam MD   lisinopril (PRINIVIL;ZESTRIL) 10 MG tablet Take 1 tablet by mouth daily Yes Ariella Miller MD   propranolol (INDERAL LA) 60 MG extended release capsule TAKE ONE CAPSULE BY MOUTH DAILY Yes Ariella Miller MD   MAGNESIUM-OXIDE 400 (240 Mg) MG tablet TAKE ONE TABLET BY MOUTH DAILY Yes Ariella Miller MD   buPROPion (WELLBUTRIN XL) 300 MG extended release tablet TAKE ONE TABLET BY MOUTH EVERY MORNING  Patient not taking: Reported on 2022  Ariella Miller MD       Social History     Tobacco Use    Smoking status: Former     Packs/day: 0.50     Years: 18.00 Pack years: 9.00     Types: Cigarettes    Smokeless tobacco: Never   Vaping Use    Vaping Use: Never used   Substance Use Topics    Alcohol use: Yes     Alcohol/week: 0.0 standard drinks     Comment:  3 x yearly    Drug use: Not Currently     Types: IV     Comment: recovery x 6.5 years            RECORD REVIEW: Previous medical records were reviewed at today's visit. PHYSICAL EXAMINATION:    Vital Signs: (As obtained by patient/caregiver at home)      Constitutional: [] Appears well-developed and well-nourished [] No apparent distress      [] Abnormal   Mental status  [] Alert and awake  [] Oriented to person/place/time []Able to follow commands        Pulmonary/Chest: [] Respiratory effort normal.  [] No visualized signs of difficulty breathing or respiratory distress        [] Abnormal              Psychiatric:       [] Normal [] Abnormal        [] No Hallucinations    Other pertinent observable physical exam findings:-              RECORD REVIEW: Previous medical records were reviewed at today's visit. The past family, medical and social histories were reviewed and unchanged with the exceptions of what is mentioned in this note. Due to this being a TeleHealth encounter, evaluation of the following organ systems is limited: Vitals/Constitutional/EENT/Resp/CV/GI//MS/Neuro/Skin/Heme-Lymph-Imm. ASSESSMENT/PLAN:  1. Episode of recurrent major depressive disorder, unspecified depression episode severity ([facility])  Counselled to try the medications again     2. Smoker  Counselled to restart wellbutyrin     3. Breast pain  Advised to see OB or make an appointment with me for an exam and evaluation       Total Time spent 25 min     Return in about 3 months (around 2/8/2023). An  electronic signature was used to authenticate this note.     --Martin Styles MD on 11/8/2022 at 11:22 AM    9}    Pursuant to the emergency declaration under the [facility] waiver authority and the Coronavirus Preparedness and Response Supplemental Appropriations Act, this Virtual  Visit was conducted, with patient's consent, to reduce the patient's risk of exposure to COVID-19 and provide continuity of care for an established patient. Services were provided through a telephone discussion virtually to substitute for in-person clinic visit.

## 2022-11-30 NOTE — TELEPHONE ENCOUNTER
Request for hydroxine. On wait list for feb appointment no appt available. Next Visit Date:  No future appointments.     Health Maintenance   Topic Date Due    COVID-19 Vaccine (1) Never done    Cervical cancer screen  11/27/2021    Depression Monitoring  04/05/2022    Flu vaccine (1) 08/01/2022    Lipids  09/26/2024    DTaP/Tdap/Td vaccine (2 - Td or Tdap) 10/07/2026    Hepatitis C screen  Completed    HIV screen  Completed    Hepatitis A vaccine  Aged Out    Hib vaccine  Aged Out    Meningococcal (ACWY) vaccine  Aged Out    Pneumococcal 0-64 years Vaccine  Aged Out    Varicella vaccine  Discontinued       Hemoglobin A1C (%)   Date Value   09/26/2019 4.7   02/05/2018 4.4   03/30/2016 5.2             ( goal A1C is < 7)   No results found for: LABMICR  LDL Cholesterol (mg/dL)   Date Value   09/26/2019 100       (goal LDL is <100)   AST (U/L)   Date Value   03/14/2022 33 (H)     ALT (U/L)   Date Value   03/14/2022 15     BUN (mg/dL)   Date Value   03/14/2022 19     BP Readings from Last 3 Encounters:   03/14/22 130/80   12/14/21 121/83   12/01/21 121/70          (goal 120/80)    All Future Testing planned in [facility]        Patient Active Problem List:     HUE (generalized anxiety disorder)     Panic disorder     History of heroin abuse ([facility] [address]     Essential hypertension     Left breast mass     Breast fibroadenoma, left

## 2022-12-01 RX ORDER — HYDROXYZINE HYDROCHLORIDE 25 MG/1
TABLET, FILM COATED ORAL
Qty: 30 TABLET | Refills: 0 | Status: SHIPPED | OUTPATIENT
Start: 2022-12-01

## 2023-01-09 RX ORDER — OMEPRAZOLE 20 MG/1
CAPSULE, DELAYED RELEASE ORAL
Qty: 30 CAPSULE | Refills: 3 | Status: SHIPPED | OUTPATIENT
Start: 2023-01-09

## 2023-01-09 RX ORDER — HYDROXYZINE HYDROCHLORIDE 25 MG/1
TABLET, FILM COATED ORAL
Qty: 30 TABLET | Refills: 0 | Status: SHIPPED | OUTPATIENT
Start: 2023-01-09

## 2023-01-09 NOTE — TELEPHONE ENCOUNTER
Request for atrax and priolsec. On wait list for next annemarie    Next Visit Date:  No future appointments.     Health Maintenance   Topic Date Due    COVID-19 Vaccine (1) Never done    Cervical cancer screen  11/27/2021    Depression Monitoring  04/05/2022    Flu vaccine (1) 08/01/2022    Lipids  09/26/2024    DTaP/Tdap/Td vaccine (2 - Td or Tdap) 10/07/2026    Hepatitis C screen  Completed    HIV screen  Completed    Hepatitis A vaccine  Aged Out    Hib vaccine  Aged Out    Meningococcal (ACWY) vaccine  Aged Out    Pneumococcal 0-64 years Vaccine  Aged Out    Varicella vaccine  Discontinued       Hemoglobin A1C (%)   Date Value   09/26/2019 4.7   02/05/2018 4.4   03/30/2016 5.2             ( goal A1C is < 7)   No results found for: LABMICR  LDL Cholesterol (mg/dL)   Date Value   09/26/2019 100       (goal LDL is <100)   AST (U/L)   Date Value   03/14/2022 33 (H)     ALT (U/L)   Date Value   03/14/2022 15     BUN (mg/dL)   Date Value   03/14/2022 19     BP Readings from Last 3 Encounters:   03/14/22 130/80   12/14/21 121/83   12/01/21 121/70          (goal 120/80)    All Future Testing planned in [facility]        Patient Active Problem List:     HUE (generalized anxiety disorder)     Panic disorder     History of heroin abuse ([facility] [address]     Essential hypertension     Left breast mass     Breast fibroadenoma, left

## 2023-01-16 ENCOUNTER — TELEPHONE (OUTPATIENT)
Dept: INTERNAL MEDICINE | Age: 43
End: 2023-01-16

## 2023-02-16 ENCOUNTER — OFFICE VISIT (OUTPATIENT)
Dept: INTERNAL MEDICINE | Age: 43
End: 2023-02-16
Payer: COMMERCIAL

## 2023-02-16 VITALS
WEIGHT: 169 LBS | TEMPERATURE: 97.9 F | HEIGHT: 65 IN | BODY MASS INDEX: 28.16 KG/M2 | DIASTOLIC BLOOD PRESSURE: 74 MMHG | HEART RATE: 57 BPM | SYSTOLIC BLOOD PRESSURE: 122 MMHG | OXYGEN SATURATION: 99 %

## 2023-02-16 DIAGNOSIS — Z82.61 FAMILY HISTORY OF RHEUMATOID ARTHRITIS: ICD-10-CM

## 2023-02-16 DIAGNOSIS — M77.12 LEFT LATERAL EPICONDYLITIS: Primary | ICD-10-CM

## 2023-02-16 PROCEDURE — 3074F SYST BP LT 130 MM HG: CPT | Performed by: STUDENT IN AN ORGANIZED HEALTH CARE EDUCATION/TRAINING PROGRAM

## 2023-02-16 PROCEDURE — 99213 OFFICE O/P EST LOW 20 MIN: CPT | Performed by: STUDENT IN AN ORGANIZED HEALTH CARE EDUCATION/TRAINING PROGRAM

## 2023-02-16 PROCEDURE — 3078F DIAST BP <80 MM HG: CPT | Performed by: STUDENT IN AN ORGANIZED HEALTH CARE EDUCATION/TRAINING PROGRAM

## 2023-02-16 RX ORDER — ACETAMINOPHEN 500 MG
500 TABLET ORAL EVERY 6 HOURS PRN
Qty: 28 TABLET | Refills: 0 | Status: CANCELLED | OUTPATIENT
Start: 2023-02-16 | End: 2023-02-23

## 2023-02-16 SDOH — ECONOMIC STABILITY: FOOD INSECURITY: WITHIN THE PAST 12 MONTHS, YOU WORRIED THAT YOUR FOOD WOULD RUN OUT BEFORE YOU GOT MONEY TO BUY MORE.: NEVER TRUE

## 2023-02-16 SDOH — ECONOMIC STABILITY: FOOD INSECURITY: WITHIN THE PAST 12 MONTHS, THE FOOD YOU BOUGHT JUST DIDN'T LAST AND YOU DIDN'T HAVE MONEY TO GET MORE.: NEVER TRUE

## 2023-02-16 SDOH — ECONOMIC STABILITY: INCOME INSECURITY: HOW HARD IS IT FOR YOU TO PAY FOR THE VERY BASICS LIKE FOOD, HOUSING, MEDICAL CARE, AND HEATING?: NOT HARD AT ALL

## 2023-02-16 SDOH — ECONOMIC STABILITY: HOUSING INSECURITY
IN THE LAST 12 MONTHS, WAS THERE A TIME WHEN YOU DID NOT HAVE A STEADY PLACE TO SLEEP OR SLEPT IN A SHELTER (INCLUDING NOW)?: NO

## 2023-02-16 ASSESSMENT — PATIENT HEALTH QUESTIONNAIRE - PHQ9
SUM OF ALL RESPONSES TO PHQ QUESTIONS 1-9: 0
9. THOUGHTS THAT YOU WOULD BE BETTER OFF DEAD, OR OF HURTING YOURSELF: 0
SUM OF ALL RESPONSES TO PHQ QUESTIONS 1-9: 0
3. TROUBLE FALLING OR STAYING ASLEEP: 0
5. POOR APPETITE OR OVEREATING: 0
6. FEELING BAD ABOUT YOURSELF - OR THAT YOU ARE A FAILURE OR HAVE LET YOURSELF OR YOUR FAMILY DOWN: 0
7. TROUBLE CONCENTRATING ON THINGS, SUCH AS READING THE NEWSPAPER OR WATCHING TELEVISION: 0
4. FEELING TIRED OR HAVING LITTLE ENERGY: 0
10. IF YOU CHECKED OFF ANY PROBLEMS, HOW DIFFICULT HAVE THESE PROBLEMS MADE IT FOR YOU TO DO YOUR WORK, TAKE CARE OF THINGS AT HOME, OR GET ALONG WITH OTHER PEOPLE: 0
1. LITTLE INTEREST OR PLEASURE IN DOING THINGS: 0
SUM OF ALL RESPONSES TO PHQ QUESTIONS 1-9: 0
2. FEELING DOWN, DEPRESSED OR HOPELESS: 0
SUM OF ALL RESPONSES TO PHQ9 QUESTIONS 1 & 2: 0
SUM OF ALL RESPONSES TO PHQ QUESTIONS 1-9: 0
8. MOVING OR SPEAKING SO SLOWLY THAT OTHER PEOPLE COULD HAVE NOTICED. OR THE OPPOSITE, BEING SO FIGETY OR RESTLESS THAT YOU HAVE BEEN MOVING AROUND A LOT MORE THAN USUAL: 0

## 2023-02-16 ASSESSMENT — ENCOUNTER SYMPTOMS
FACIAL SWELLING: 0
BACK PAIN: 0
WHEEZING: 0
EYE REDNESS: 0
EYE DISCHARGE: 0
SHORTNESS OF BREATH: 0

## 2023-02-16 NOTE — PROGRESS NOTES
Attending Physician Statement  I have discussed the care of John Moment, including pertinent history and exam findings,  with the resident. I have seen and examined the patient and the key elements of all parts of the encounter have been performed by me. I agree with the assessment, plan and orders as documented by the resident.   (GC Modifier)

## 2023-02-16 NOTE — PROGRESS NOTES
[facility]  [facility] [address] [address]  Dept: [phone]  Dept Fax: [phone]    Office Progress/Follow Up Note  Date ofpatient's visit: 2/16/2023  Patient's Name:  Shonna Benavidez YOB: 1980            Patient Care Team:  Gaurav Ely MD as PCP - General (Internal Medicine)  Gaurav Ely MD as PCP - Empaneled Provider  ================================================================    REASON FOR VISIT/CHIEF COMPLAINT:    Joint Pain (left arm/elbow/wrist pain, barely able to lift it, over a week now, possible gym injury / X 9 days states pain rate is 7 has been taking motrin OTC) and Health Maintenance (Declines flu vaccine)    HISTORY OF PRESENTING ILLNESS:  History was obtained from: patient, electronic medical record. Queen Sharon felder 43 y.o. is here for a same day visit with a chief complaint of left elbow pain which started 7 days back which has progressed over the last 2 days. She describes the pain as 1/10, achy, sharp and is progressive associated with numbness of the left wrist.  She did try ibuprofen and applying some ice and keeping the arm rest but the pain still persist and want to be evaluated. She has recently started gym week back. History is positive for positive smoking 10 cigarettes a month, alcohol dependence. Last alcohol intake 3 days back she drank 5-7 drinks of liquor. She also has positive history of rheumatoid arthritis in her dad and her grandmother. History is also positive for herself and her  having recent viral infection. Noted multiple kids having conjunctivitis and upper respiratory tract infection at home currently. She also has positive history of COVID infection that got recovered 2 months back. She has been noted to have positive stiffness in her shoulders, wrist, fingers over the last 5 years along with redness and swelling of her digits.   She never sought any medical attention for that.  Had equivocal JONO and negative rheumatoid factor in the past.    She denies any other complaints in today's office visit. Patient Active Problem List   Diagnosis    HUE (generalized anxiety disorder)    Panic disorder    History of heroin abuse ([facility] [address]    Essential hypertension    Left breast mass    Breast fibroadenoma, left       Health Maintenance Due   Topic Date Due    COVID-19 Vaccine (1) Never done    Cervical cancer screen  11/27/2021    Flu vaccine (1) 08/01/2022       Allergies   Allergen Reactions    Codeine Other (See Comments)     Welts as a child    Morphine Other (See Comments)     hives    Sulfa Antibiotics Other (See Comments)     Welts after dying hair         Current Outpatient Medications   Medication Sig Dispense Refill    diclofenac (VOLTAREN) 50 MG EC tablet Take 1 tablet by mouth 2 times daily 20 tablet 0    hydrOXYzine HCl (ATARAX) 25 MG tablet TAKE ONE TABLET BY MOUTH THREE TIMES A DAY AS NEEDED FOR ITCHING 30 tablet 0    omeprazole (PRILOSEC) 20 MG delayed release capsule TAKE ONE CAPSULE BY MOUTH DAILY WITH BREAKFAST 30 capsule 3    Cholecalciferol (VITAMIN D) 125 MCG (5000 UT) CAPS Take 1 tablet by mouth daily 90 capsule 3    fluticasone (FLONASE) 50 MCG/ACT nasal spray SPRAY TWO SPRAYS IN EACH NOSTRIL ONCE DAILY 1 each 2    lisinopril (PRINIVIL;ZESTRIL) 10 MG tablet Take 1 tablet by mouth daily 90 tablet 3    propranolol (INDERAL LA) 60 MG extended release capsule TAKE ONE CAPSULE BY MOUTH DAILY 30 capsule 10    MAGNESIUM-OXIDE 400 (240 Mg) MG tablet TAKE ONE TABLET BY MOUTH DAILY 30 tablet 11    buPROPion (WELLBUTRIN XL) 300 MG extended release tablet TAKE ONE TABLET BY MOUTH EVERY MORNING (Patient not taking: No sig reported) 30 tablet 10     No current facility-administered medications for this visit.        Social History     Tobacco Use    Smoking status: Former     Packs/day: 0.50     Years: 18.00     Pack years: 9.00     Types: Cigarettes    Smokeless tobacco: Never Vaping Use    Vaping Use: Never used   Substance Use Topics    Alcohol use: Yes     Alcohol/week: 0.0 standard drinks     Comment:  3 x yearly    Drug use: Not Currently     Types: IV     Comment: recovery x 6.5 years       Family History   Problem Relation Age of Onset    Other Mother         fibroid    Heart Disease Mother     Hypertension Mother     Thyroid Disease Mother     Heart Disease Father     Other Father         deaf and legally blind    Breast Cancer Paternal Aunt     Breast Cancer Paternal Aunt     Other Other         aunts & grandmother & cousins father's have vaginal bleeding  issues    Cancer Paternal Cousin 28        cervical        REVIEW OF SYSTEMS:    Review of Systems   Constitutional:  Positive for activity change. Negative for appetite change, fever and unexpected weight change. HENT:  Negative for congestion, dental problem, ear discharge, facial swelling and hearing loss. Eyes:  Negative for discharge, redness and visual disturbance. Respiratory:  Negative for shortness of breath and wheezing. Cardiovascular: Negative. Negative for chest pain. Endocrine: Negative. Musculoskeletal:  Positive for arthralgias and joint swelling. Negative for back pain, gait problem, myalgias, neck pain and neck stiffness. Skin: Negative. Neurological:  Positive for numbness. PHYSICAL EXAM:  Vitals:    02/16/23 1025   BP: 122/74   Site: Left Upper Arm   Position: Sitting   Cuff Size: Medium Adult   Pulse: 57   Temp: 97.9 °F (36.6 °C)   SpO2: 99%   Weight: 169 lb (76.7 kg)   Height: 5' 5\" (1.651 m)     BP Readings from Last 3 Encounters:   02/16/23 122/74   03/14/22 130/80   12/14/21 121/83        Physical Exam  Constitutional:       Appearance: Normal appearance. Cardiovascular:      Rate and Rhythm: Normal rate and regular rhythm. Pulses: Normal pulses. Heart sounds: Normal heart sounds.    Pulmonary:      Effort: Pulmonary effort is normal.      Breath sounds: Normal breath sounds. Abdominal:      General: Abdomen is flat. Palpations: Abdomen is soft. Musculoskeletal:         General: Tenderness present. No swelling, deformity or signs of injury. Normal range of motion. Right elbow: Normal.      Left elbow: No swelling, deformity, effusion or lacerations. Normal range of motion. Tenderness present in lateral epicondyle. No radial head, medial epicondyle or olecranon process tenderness. Right forearm: No swelling, edema or tenderness. Left forearm: No swelling, edema or tenderness. Right wrist: No swelling or crepitus. Normal range of motion. Left wrist: No swelling or crepitus. Normal range of motion. Right hand: No swelling, tenderness or bony tenderness. Normal range of motion. Left hand: No swelling, tenderness or bony tenderness. Normal range of motion. Arms:       Right lower leg: No edema. Left lower leg: No edema. Comments: + tenderness with inflammation and mild swelling at left lateral epicondyle. Neurological:      Mental Status: She is alert. DIAGNOSTIC FINDINGS:    CBC:  Lab Results   Component Value Date/Time    WBC 5.3 09/26/2019 11:31 AM    HGB 14.7 09/26/2019 11:31 AM     09/26/2019 11:31 AM     BMP:    Lab Results   Component Value Date/Time     03/14/2022 09:33 AM    K 5.3 03/14/2022 09:33 AM     03/14/2022 09:33 AM    CO2 17 03/14/2022 09:33 AM    BUN 19 03/14/2022 09:33 AM    CREATININE 0.79 03/14/2022 09:33 AM    GLUCOSE 96 03/14/2022 09:33 AM       HEMOGLOBIN A1C:   Lab Results   Component Value Date/Time    LABA1C 4.7 09/26/2019 11:31 AM     FASTING LIPID PANEL:  Lab Results   Component Value Date    CHOL 176 02/05/2018    HDL 50 09/26/2019    TRIG 175 (H) 02/05/2018       ASSESSMENT AND PLAN:    Corby Zhang was seen today for joint pain and health maintenance.     Diagnoses and all orders for this visit:    Left lateral epicondylitis    -     XR ELBOW LEFT (MIN 3 VIEWS); Future  -     Rheumatoid Factor; Future  -     Cyclic Citrul Peptide Antibody, IgG; Future  -     diclofenac (VOLTAREN) 50 MG EC tablet; Take 1 tablet by mouth 2 times daily.  -Suspecting left lateral epicondylitis, encourage rest, ice and NSAID treatment with diclofenac.  -Rule out any fracture with elbow imaging      Family history of rheumatoid arthritis  -     Rheumatoid Factor; Future  -     Cyclic Citrul Peptide Antibody, IgG; Future  -W work-up for rheumatoid arthritis given positive history of intermittent stiffness associated with swelling of bilateral wrist, metacarpal joints with underlying history of rheumatoid arthritis in her family and also had personal history of rheumatoid arthritis 13 years back as per patient. Other orders  -     Cancel: Uric Acid; Future      FOLLOW UP AND INSTRUCTIONS:    Return if symptoms worsen or fail to improve. Karyn Bauer received counseling on the following healthy behaviors: tobacco cessation and decrease in alcohol consumption. Discussed use, benefit, and side effects of prescribed medications. Barriers to medication compliance addressed. All patient questions answered. Pt voiced understanding. Patient given educational materials - see patient instructions    Zayda Gentile MD  Internal Medicine Resident, PGY- [address], [place]  2/16/2023, 11:28 AM    This note is created with the assistance of a speech-recognition program. While intending to generate a document that actually reflects the content of thevisit, the document can still have some mistakes which may not have been identified and corrected by editing.

## 2023-02-27 NOTE — TELEPHONE ENCOUNTER
Request for atarax.   Next annemarie on 3/29/23    Next Visit Date:  Future Appointments   Date Time Provider Paul Daii   3/29/2023 10:20 AM Nely iCd MD [id number]   Topic Date Due    COVID-19 Vaccine (1) Never done    Cervical cancer screen  11/27/2021    Flu vaccine (1) 08/01/2022    Depression Monitoring  02/16/2024    Lipids  09/26/2024    DTaP/Tdap/Td vaccine (2 - Td or Tdap) 10/07/2026    Hepatitis C screen  Completed    HIV screen  Completed    Hepatitis A vaccine  Aged Out    Hib vaccine  Aged Out    Meningococcal (ACWY) vaccine  Aged Out    Pneumococcal 0-64 years Vaccine  Aged Out    Varicella vaccine  Discontinued       Hemoglobin A1C (%)   Date Value   09/26/2019 4.7   02/05/2018 4.4   03/30/2016 5.2             ( goal A1C is < 7)   No results found for: LABMICR  LDL Cholesterol (mg/dL)   Date Value   09/26/2019 100       (goal LDL is <100)   AST (U/L)   Date Value   03/14/2022 33 (H)     ALT (U/L)   Date Value   03/14/2022 15     BUN (mg/dL)   Date Value   03/14/2022 19     BP Readings from Last 3 Encounters:   02/16/23 122/74   03/14/22 130/80   12/14/21 121/83          (goal 120/80)    All Future Testing planned in [facility]  Lab Frequency Next Occurrence   XR ELBOW LEFT (MIN 3 VIEWS) Once 02/16/2023   Rheumatoid Factor Once 06/95/4703   Cyclic Citrul Peptide Antibody, IgG Once 02/16/2023         Patient Active Problem List:     HUE (generalized anxiety disorder)     Panic disorder     History of heroin abuse ([facility] [address]     Essential hypertension     Left breast mass     Breast fibroadenoma, left

## 2023-02-28 DIAGNOSIS — K21.9 GASTROESOPHAGEAL REFLUX DISEASE WITHOUT ESOPHAGITIS: ICD-10-CM

## 2023-02-28 NOTE — TELEPHONE ENCOUNTER
Health Maintenance   Topic Date Due    COVID-19 Vaccine (1) Never done    Cervical cancer screen  11/27/2021    Flu vaccine (1) 08/01/2022    Depression Monitoring  02/16/2024    Lipids  09/26/2024    DTaP/Tdap/Td vaccine (2 - Td or Tdap) 10/07/2026    Hepatitis C screen  Completed    HIV screen  Completed    Hepatitis A vaccine  Aged Out    Hib vaccine  Aged Out    Meningococcal (ACWY) vaccine  Aged Out    Pneumococcal 0-64 years Vaccine  Aged Out    Varicella vaccine  Discontinued             (applicable per patient's age: Cancer Screenings, Depression Screening, Fall Risk Screening, Immunizations)    Hemoglobin A1C (%)   Date Value   09/26/2019 4.7   02/05/2018 4.4   03/30/2016 5.2     LDL Cholesterol (mg/dL)   Date Value   09/26/2019 100     AST (U/L)   Date Value   03/14/2022 33 (H)     ALT (U/L)   Date Value   03/14/2022 15     BUN (mg/dL)   Date Value   03/14/2022 19      (goal A1C is < 7)   (goal LDL is <100) need 30-50% reduction from baseline     BP Readings from Last 3 Encounters:   02/16/23 122/74   03/14/22 130/80   12/14/21 121/83    (goal /80)      All Future Testing planned in [facility]:  Lab Frequency Next Occurrence   XR ELBOW LEFT (MIN 3 VIEWS) Once 02/16/2023   Rheumatoid Factor Once 65/42/0886   Cyclic Citrul Peptide Antibody, IgG Once 02/16/2023       Next Visit Date:  Future Appointments   Date Time Provider Paul Daniels   3/29/2023 10:20 AM Vanessa Cramer MD [id number]            Patient Active Problem List:     HUE (generalized anxiety disorder)     Panic disorder     History of heroin abuse ([facility] [address]     Essential hypertension     Left breast mass     Breast fibroadenoma, left

## 2023-03-01 RX ORDER — HYDROXYZINE HYDROCHLORIDE 25 MG/1
TABLET, FILM COATED ORAL
Qty: 30 TABLET | Refills: 0 | Status: SHIPPED | OUTPATIENT
Start: 2023-03-01

## 2023-03-01 RX ORDER — FLUTICASONE PROPIONATE 50 MCG
SPRAY, SUSPENSION (ML) NASAL
Qty: 1 EACH | Refills: 2 | Status: SHIPPED | OUTPATIENT
Start: 2023-03-01

## 2023-03-06 ENCOUNTER — HOSPITAL ENCOUNTER (OUTPATIENT)
Age: 43
Setting detail: SPECIMEN
Discharge: HOME | End: 2023-03-06

## 2023-03-06 DIAGNOSIS — M77.12 LEFT LATERAL EPICONDYLITIS: ICD-10-CM

## 2023-03-06 DIAGNOSIS — Z82.61 FAMILY HISTORY OF RHEUMATOID ARTHRITIS: ICD-10-CM

## 2023-03-06 LAB
RHEUMATOID FACTOR: 21.8 IU/ML
URATE SERPL-MCNC: 4.8 MG/DL (ref 2.4–5.7)

## 2023-03-09 LAB — CCP IGG ANTIBODIES: 1.3 U/ML (ref 0–7)

## 2023-03-22 DIAGNOSIS — M77.12 LEFT LATERAL EPICONDYLITIS: ICD-10-CM

## 2023-03-22 NOTE — TELEPHONE ENCOUNTER
Last visit: 2/16/23  Last Med refill:   Does patient have enough medication for 72 hours: No:     Next Visit Date:  Future Appointments   Date Time Provider Paul Daniels   3/29/2023 10:20 AM Rebecca Lovett MD [id number] [facility]   Topic Date Due    COVID-19 Vaccine (1) Never done    Cervical cancer screen  11/27/2021    Flu vaccine (1) 08/01/2022    Depression Monitoring  02/16/2024    Lipids  09/26/2024    DTaP/Tdap/Td vaccine (2 - Td or Tdap) 10/07/2026    Hepatitis C screen  Completed    HIV screen  Completed    Hepatitis A vaccine  Aged Out    Hib vaccine  Aged Out    Meningococcal (ACWY) vaccine  Aged Out    Pneumococcal 0-64 years Vaccine  Aged Out    Varicella vaccine  Discontinued       Hemoglobin A1C (%)   Date Value   09/26/2019 4.7   02/05/2018 4.4   03/30/2016 5.2             ( goal A1C is < 7)   No results found for: LABMICR  LDL Cholesterol (mg/dL)   Date Value   09/26/2019 100   02/05/2018 84       (goal LDL is <100)   AST (U/L)   Date Value   03/14/2022 33 (H)     ALT (U/L)   Date Value   03/14/2022 15     BUN (mg/dL)   Date Value   03/14/2022 19     BP Readings from Last 3 Encounters:   02/16/23 122/74   03/14/22 130/80   12/14/21 121/83          (goal 120/80)    All Future Testing planned in [facility]  Lab Frequency Next Occurrence   XR ELBOW LEFT (MIN 3 VIEWS) Once 02/16/2023               Patient Active Problem List:     HUE (generalized anxiety disorder)     Panic disorder     History of heroin abuse ([facility] [address]     Essential hypertension     Left breast mass     Breast fibroadenoma, left

## 2023-03-23 RX ORDER — HYDROXYZINE HYDROCHLORIDE 25 MG/1
TABLET, FILM COATED ORAL
Qty: 30 TABLET | Refills: 0 | Status: SHIPPED | OUTPATIENT
Start: 2023-03-23 | End: 2023-05-23 | Stop reason: SDUPTHER

## 2023-04-22 DIAGNOSIS — K21.9 GASTROESOPHAGEAL REFLUX DISEASE WITHOUT ESOPHAGITIS: ICD-10-CM

## 2023-04-24 NOTE — TELEPHONE ENCOUNTER
Request for magnesium      Next Visit Date:pt last seen 2/16/23  No future appointments.     Health Maintenance   Topic Date Due    COVID-19 Vaccine (1) Never done    Cervical cancer screen  11/27/2021    Flu vaccine (Season Ended) 08/01/2023    Depression Monitoring  02/16/2024    Lipids  09/26/2024    DTaP/Tdap/Td vaccine (2 - Td or Tdap) 10/07/2026    Hepatitis C screen  Completed    HIV screen  Completed    Hepatitis A vaccine  Aged Out    Hib vaccine  Aged Out    Meningococcal (ACWY) vaccine  Aged Out    Pneumococcal 0-64 years Vaccine  Aged Out    Varicella vaccine  Discontinued       Hemoglobin A1C (%)   Date Value   09/26/2019 4.7   02/05/2018 4.4   03/30/2016 5.2             ( goal A1C is < 7)   No results found for: LABMICR  LDL Cholesterol (mg/dL)   Date Value   09/26/2019 100       (goal LDL is <100)   AST (U/L)   Date Value   03/14/2022 33 (H)     ALT (U/L)   Date Value   03/14/2022 15     BUN (mg/dL)   Date Value   03/14/2022 19     BP Readings from Last 3 Encounters:   02/16/23 122/74   03/14/22 130/80   12/14/21 121/83          (goal 120/80)    All Future Testing planned in [facility]  Lab Frequency Next Occurrence   XR ELBOW LEFT (MIN 3 VIEWS) Once 02/16/2023         Patient Active Problem List:     HUE (generalized anxiety disorder)     Panic disorder     History of heroin abuse ([address]     Essential hypertension     Left breast mass     Breast fibroadenoma, left

## 2023-04-25 RX ORDER — MAGNESIUM OXIDE TAB 400 MG (241.3 MG ELEMENTAL MG) 400 (241.3 MG) MG
TAB ORAL
Qty: 30 TABLET | Refills: 11 | Status: SHIPPED | OUTPATIENT
Start: 2023-04-25

## 2023-05-04 ENCOUNTER — TELEPHONE (OUTPATIENT)
Dept: INTERNAL MEDICINE | Age: 43
End: 2023-05-04

## 2023-05-04 DIAGNOSIS — Z12.31 BREAST CANCER SCREENING BY MAMMOGRAM: Primary | ICD-10-CM

## 2023-05-16 RX ORDER — OMEPRAZOLE 20 MG/1
CAPSULE, DELAYED RELEASE ORAL
Qty: 30 CAPSULE | Refills: 3 | Status: SHIPPED | OUTPATIENT
Start: 2023-05-16

## 2023-05-23 RX ORDER — HYDROXYZINE HYDROCHLORIDE 25 MG/1
TABLET, FILM COATED ORAL
Qty: 30 TABLET | Refills: 0 | Status: SHIPPED | OUTPATIENT
Start: 2023-05-23

## 2023-05-23 NOTE — TELEPHONE ENCOUNTER
Medication refill for Hydroxyzine    Last visit: 2/16/23  Last Med refill: 3/23/23  Does patient have enough medication for 72 hours: No:     Next Visit Date:  No future appointments.     Health Maintenance   Topic Date Due    COVID-19 Vaccine (1) Never done    Cervical cancer screen  11/27/2021    Flu vaccine (Season Ended) 08/01/2023    Depression Monitoring  02/16/2024    Lipids  09/26/2024    DTaP/Tdap/Td vaccine (2 - Td or Tdap) 10/07/2026    Hepatitis C screen  Completed    HIV screen  Completed    Hepatitis A vaccine  Aged Out    Hib vaccine  Aged Out    Meningococcal (ACWY) vaccine  Aged Out    Pneumococcal 0-64 years Vaccine  Aged Out    Varicella vaccine  Discontinued       Hemoglobin A1C (%)   Date Value   09/26/2019 4.7   02/05/2018 4.4   03/30/2016 5.2             ( goal A1C is < 7)   No results found for: LABMICR  LDL Cholesterol (mg/dL)   Date Value   09/26/2019 100   02/05/2018 84       (goal LDL is <100)   AST (U/L)   Date Value   03/14/2022 33 (H)     ALT (U/L)   Date Value   03/14/2022 15     BUN (mg/dL)   Date Value   03/14/2022 19     BP Readings from Last 3 Encounters:   02/16/23 122/74   03/14/22 130/80   12/14/21 121/83          (goal 120/80)    All Future Testing planned in [facility]  Lab Frequency Next Occurrence   XR ELBOW LEFT (MIN 3 VIEWS) Once 02/16/2023   SERGIO DIGITAL SCREEN W OR WO CAD BILATERAL Once 08/12/2023               Patient Active Problem List:     HUE (generalized anxiety disorder)     Panic disorder     History of heroin abuse ([facility] [address]     Essential hypertension     Left breast mass     Breast fibroadenoma, left

## 2023-06-05 DIAGNOSIS — F41.1 GAD (GENERALIZED ANXIETY DISORDER): ICD-10-CM

## 2023-06-05 RX ORDER — PROPRANOLOL HCL 60 MG
CAPSULE, EXTENDED RELEASE 24HR ORAL
Qty: 30 CAPSULE | Refills: 10 | Status: SHIPPED | OUTPATIENT
Start: 2023-06-05

## 2023-06-05 NOTE — TELEPHONE ENCOUNTER
Last seen 02/16/23      Next Visit Date:  No future appointments.     Health Maintenance   Topic Date Due    COVID-19 Vaccine (1) Never done    Cervical cancer screen  11/27/2021    Flu vaccine (Season Ended) 08/01/2023    Depression Monitoring  02/16/2024    Lipids  09/26/2024    DTaP/Tdap/Td vaccine (2 - Td or Tdap) 10/07/2026    Hepatitis C screen  Completed    HIV screen  Completed    Hepatitis A vaccine  Aged Out    Hib vaccine  Aged Out    Meningococcal (ACWY) vaccine  Aged Out    Pneumococcal 0-64 years Vaccine  Aged Out    Varicella vaccine  Discontinued       Hemoglobin A1C (%)   Date Value   09/26/2019 4.7   02/05/2018 4.4   03/30/2016 5.2             ( goal A1C is < 7)   No results found for: LABMICR  LDL Cholesterol (mg/dL)   Date Value   09/26/2019 100       (goal LDL is <100)   AST (U/L)   Date Value   03/14/2022 33 (H)     ALT (U/L)   Date Value   03/14/2022 15     BUN (mg/dL)   Date Value   03/14/2022 19     BP Readings from Last 3 Encounters:   02/16/23 122/74   03/14/22 130/80   12/14/21 121/83          (goal 120/80)    All Future Testing planned in [facility]  Lab Frequency Next Occurrence   XR ELBOW LEFT (MIN 3 VIEWS) Once 02/16/2023   SERGIO DIGITAL SCREEN W OR WO CAD BILATERAL Once 08/12/2023         Patient Active Problem List:     HUE (generalized anxiety disorder)     Panic disorder     History of heroin abuse ([facility] [address]     Essential hypertension     Left breast mass     Breast fibroadenoma, left

## 2023-06-20 DIAGNOSIS — K21.9 GASTROESOPHAGEAL REFLUX DISEASE WITHOUT ESOPHAGITIS: ICD-10-CM

## 2023-06-21 RX ORDER — HYDROXYZINE HYDROCHLORIDE 25 MG/1
TABLET, FILM COATED ORAL
Qty: 30 TABLET | Refills: 0 | Status: SHIPPED | OUTPATIENT
Start: 2023-06-21

## 2023-06-21 RX ORDER — FLUTICASONE PROPIONATE 50 MCG
SPRAY, SUSPENSION (ML) NASAL
Qty: 1 EACH | Refills: 3 | Status: SHIPPED | OUTPATIENT
Start: 2023-06-21

## 2023-06-21 NOTE — TELEPHONE ENCOUNTER
Medication refill for Atarax and Flonase    Last visit: 2/16/23  Last Med refill: multiple meds  Does patient have enough medication for 72 hours: No:     Next Visit Date:  No future appointments.     Health Maintenance   Topic Date Due    COVID-19 Vaccine (1) Never done    Cervical cancer screen  11/27/2021    Flu vaccine (Season Ended) 08/01/2023    Depression Monitoring  02/16/2024    Lipids  09/26/2024    DTaP/Tdap/Td vaccine (2 - Td or Tdap) 10/07/2026    Hepatitis C screen  Completed    HIV screen  Completed    Hepatitis A vaccine  Aged Out    Hib vaccine  Aged Out    Meningococcal (ACWY) vaccine  Aged Out    Pneumococcal 0-64 years Vaccine  Aged Out    Varicella vaccine  Discontinued       Hemoglobin A1C (%)   Date Value   09/26/2019 4.7   02/05/2018 4.4   03/30/2016 5.2             ( goal A1C is < 7)   No results found for: LABMICR  LDL Cholesterol (mg/dL)   Date Value   09/26/2019 100   02/05/2018 84       (goal LDL is <100)   AST (U/L)   Date Value   03/14/2022 33 (H)     ALT (U/L)   Date Value   03/14/2022 15     BUN (mg/dL)   Date Value   03/14/2022 19     BP Readings from Last 3 Encounters:   02/16/23 122/74   03/14/22 130/80   12/14/21 121/83          (goal 120/80)    All Future Testing planned in [facility]  Lab Frequency Next Occurrence   XR ELBOW LEFT (MIN 3 VIEWS) Once 02/16/2023   SERGIO DIGITAL SCREEN W OR WO CAD BILATERAL Once 08/12/2023               Patient Active Problem List:     HUE (generalized anxiety disorder)     Panic disorder     History of heroin abuse ([facility] [address]     Essential hypertension     Left breast mass     Breast fibroadenoma, left

## 2023-06-26 ENCOUNTER — TELEPHONE (OUTPATIENT)
Dept: INTERNAL MEDICINE | Age: 43
End: 2023-06-26

## 2023-06-28 RX ORDER — HYDROXYZINE HYDROCHLORIDE 25 MG/1
TABLET, FILM COATED ORAL
Qty: 30 TABLET | Refills: 0 | OUTPATIENT
Start: 2023-06-28

## 2023-07-06 ENCOUNTER — TELEPHONE (OUTPATIENT)
Dept: INTERNAL MEDICINE | Age: 43
End: 2023-07-06

## 2023-07-17 NOTE — TELEPHONE ENCOUNTER
I do not have any pended orders, Kindly document the message from the radiology team or pend the order

## 2023-07-31 NOTE — TELEPHONE ENCOUNTER
Last visit: 2/16/23  Last Med refill: 6/21/23  Does patient have enough medication for 72 hours: No    Next Visit Date:  Future Appointments   Date Time Provider [address]   8/23/2023  1:30 PM STA MAMMO RM 1 ST[facility] MAMMO [facility] Radiolog   8/23/2023  2:00 PM STA ULTRASOUND RM 1 STAZ US [facility] Radiolog       Health Maintenance   Topic Date Due    COVID-19 Vaccine (1) Never done    Cervical cancer screen  11/27/2021    Flu vaccine (1) 08/01/2023    Depression Monitoring  02/16/2024    Lipids  09/26/2024    DTaP/Tdap/Td vaccine (2 - Td or Tdap) 10/07/2026    Hepatitis C screen  Completed    HIV screen  Completed    Hepatitis A vaccine  Aged Out    Hib vaccine  Aged Out    Meningococcal (ACWY) vaccine  Aged Out    Pneumococcal 0-64 years Vaccine  Aged Out    Varicella vaccine  Discontinued       Hemoglobin A1C (%)   Date Value   09/26/2019 4.7   02/05/2018 4.4   03/30/2016 5.2             ( goal A1C is < 7)   No components found for: LABMICR  LDL Cholesterol (mg/dL)   Date Value   09/26/2019 100   02/05/2018 84       (goal LDL is <100)   AST (U/L)   Date Value   03/14/2022 33 (H)     ALT (U/L)   Date Value   03/14/2022 15     BUN (mg/dL)   Date Value   03/14/2022 19     BP Readings from Last 3 Encounters:   02/16/23 122/74   03/14/22 130/80   12/14/21 121/83          (goal 120/80)    All Future Testing planned in [facility]  Lab Frequency Next Occurrence   XR ELBOW LEFT (MIN 3 VIEWS) Once 02/16/2023   SERGIO DIGITAL SCREEN W OR WO CAD BILATERAL Once 08/12/2023   SERGIO DIGITAL DIAGNOSTIC W OR WO CAD BILATERAL Once 07/26/2023   US BREAST LIMITED LEFT Once 07/26/2023   US BREAST LIMITED RIGHT Once 07/26/2023               Patient Active Problem List:     HUE (generalized anxiety disorder)     Panic disorder     History of heroin abuse ([address])     Essential hypertension     Left breast mass     Breast fibroadenoma, left

## 2023-08-03 RX ORDER — HYDROXYZINE HYDROCHLORIDE 25 MG/1
TABLET, FILM COATED ORAL
Qty: 30 TABLET | Refills: 0 | Status: SHIPPED | OUTPATIENT
Start: 2023-08-03

## 2023-08-23 ENCOUNTER — HOSPITAL ENCOUNTER (OUTPATIENT)
Dept: MAMMOGRAPHY | Age: 43
Discharge: HOME | End: 2023-08-25
Payer: COMMERCIAL

## 2023-08-23 ENCOUNTER — HOSPITAL ENCOUNTER (OUTPATIENT)
Dept: ULTRASOUND IMAGING | Age: 43
Discharge: HOME | End: 2023-08-25
Payer: COMMERCIAL

## 2023-08-23 DIAGNOSIS — N64.4 BREAST PAIN: ICD-10-CM

## 2023-08-23 PROCEDURE — G0279 TOMOSYNTHESIS, MAMMO: HCPCS

## 2023-09-22 DIAGNOSIS — I10 ESSENTIAL HYPERTENSION: ICD-10-CM

## 2023-09-22 NOTE — TELEPHONE ENCOUNTER
Pharmacy requesting refills for Lisinopril. Please review and e-scribe to pharmacy listed in chart if appropriate. Thank you. Next Visit Date: 10/6/23  Last Visit Date: 2/16/23    No future appointments.     Health Maintenance   Topic Date Due    Hepatitis B vaccine (1 of 3 - 3-dose series) Never done    COVID-19 Vaccine (1) Never done    Cervical cancer screen  11/27/2021    Flu vaccine (1) 08/01/2023    Depression Monitoring  02/16/2024    Lipids  09/26/2024    DTaP/Tdap/Td vaccine (2 - Td or Tdap) 10/07/2026    Hepatitis C screen  Completed    HIV screen  Completed    Hepatitis A vaccine  Aged Out    Hib vaccine  Aged Out    HPV vaccine  Aged Out    Meningococcal (ACWY) vaccine  Aged Out    Pneumococcal 0-64 years Vaccine  Aged Out    Varicella vaccine  Discontinued       Hemoglobin A1C (%)   Date Value   09/26/2019 4.7   02/05/2018 4.4   03/30/2016 5.2             ( goal A1C is < 7)   No components found for: \"LABMICR\"  LDL Cholesterol (mg/dL)   Date Value   09/26/2019 100       (goal LDL is <100)   AST (U/L)   Date Value   03/14/2022 33 (H)     ALT (U/L)   Date Value   03/14/2022 15     BUN (mg/dL)   Date Value   03/14/2022 19     BP Readings from Last 3 Encounters:   02/16/23 122/74   03/14/22 130/80   12/14/21 121/83          (goal 120/80)    All Future Testing planned in [facility]  Lab Frequency Next Occurrence   XR ELBOW LEFT (MIN 3 VIEWS) Once 02/16/2023   SERGIO DIGITAL SCREEN W OR WO CAD BILATERAL Once 08/12/2023         Patient Active Problem List:     HUE (generalized anxiety disorder)     Panic disorder     History of heroin abuse ([address])     Essential hypertension     Left breast mass     Breast fibroadenoma, left

## 2023-09-23 RX ORDER — LISINOPRIL 10 MG/1
10 TABLET ORAL DAILY
Qty: 90 TABLET | Refills: 3 | Status: SHIPPED | OUTPATIENT
Start: 2023-09-23

## 2023-10-06 ENCOUNTER — OFFICE VISIT (OUTPATIENT)
Dept: INTERNAL MEDICINE | Age: 43
End: 2023-10-06
Payer: COMMERCIAL

## 2023-10-06 VITALS
WEIGHT: 182.6 LBS | SYSTOLIC BLOOD PRESSURE: 130 MMHG | DIASTOLIC BLOOD PRESSURE: 83 MMHG | BODY MASS INDEX: 30.39 KG/M2 | OXYGEN SATURATION: 99 % | HEART RATE: 76 BPM | TEMPERATURE: 98 F

## 2023-10-06 DIAGNOSIS — F41.1 GAD (GENERALIZED ANXIETY DISORDER): Primary | ICD-10-CM

## 2023-10-06 DIAGNOSIS — I10 ESSENTIAL HYPERTENSION: ICD-10-CM

## 2023-10-06 DIAGNOSIS — F32.2 SEVERE DEPRESSION (HCC): ICD-10-CM

## 2023-10-06 PROCEDURE — 3078F DIAST BP <80 MM HG: CPT | Performed by: INTERNAL MEDICINE

## 2023-10-06 PROCEDURE — 3074F SYST BP LT 130 MM HG: CPT | Performed by: INTERNAL MEDICINE

## 2023-10-06 PROCEDURE — 99213 OFFICE O/P EST LOW 20 MIN: CPT | Performed by: INTERNAL MEDICINE

## 2023-10-06 RX ORDER — LORAZEPAM 0.5 MG/1
0.5 TABLET ORAL 2 TIMES DAILY PRN
Qty: 20 TABLET | Refills: 0 | Status: SHIPPED | OUTPATIENT
Start: 2023-10-06 | End: 2023-10-16

## 2023-10-06 RX ORDER — FLUOXETINE HYDROCHLORIDE 20 MG/1
20 CAPSULE ORAL DAILY
Qty: 90 CAPSULE | Refills: 1 | Status: SHIPPED | OUTPATIENT
Start: 2023-10-06

## 2023-10-20 DIAGNOSIS — F41.1 GAD (GENERALIZED ANXIETY DISORDER): ICD-10-CM

## 2023-10-20 NOTE — TELEPHONE ENCOUNTER
.. Request for   Requested Prescriptions     Pending Prescriptions Disp Refills    Cholecalciferol (VITAMIN D3) 125 MCG (5000 UT) CAPS [Pharmacy Med Name: VITAMIN D3 125 MCG CAPSULE] 90 capsule 3     Sig: TAKE ONE CAPSULE BY MOUTH DAILY    hydrOXYzine HCl (ATARAX) 25 MG tablet [Pharmacy Med Name: hydrOXYzine HCL 25 MG TABLET] 30 tablet      Sig: TAKE 1 TABLET BY MOUTH THREE TIMES A DAY AS NEEDED    LORazepam (ATIVAN) 0.5 MG tablet [Pharmacy Med Name: LORazepam 0.5 MG TABLET] 20 tablet      Sig: TAKE 1 TABLET BY MOUTH TWICE DAILY AS NEEDED FOR ANXIETY FOR UP TO TEN DAYS. MAX DAILY AMOUNT: 2 TABLETS    . Please review and e-scribe to pharmacy listed in chart if appropriate. Thank you.       Last Visit Date: 10/6/2023  Next Visit Date: 12/1/2023    Future Appointments   Date Time Provider [address] [place] [address]   12/1/2023  3:00 PM Shamir Del Cid MD [facility]  [address] Maintenance   Topic Date Due    Hepatitis B vaccine (1 of 3 - 3-dose series) Never done    COVID-19 Vaccine (1) Never done    Cervical cancer screen  11/27/2021    Flu vaccine (1) 08/01/2023    Depression Monitoring  02/16/2024    Lipids  09/26/2024    DTaP/Tdap/Td vaccine (2 - Td or Tdap) 10/07/2026    Hepatitis C screen  Completed    HIV screen  Completed    Hepatitis A vaccine  Aged Out    Hib vaccine  Aged Out    HPV vaccine  Aged Out    Meningococcal (ACWY) vaccine  Aged Out    Pneumococcal 0-64 years Vaccine  Aged Out    Varicella vaccine  Discontinued       Hemoglobin A1C (%)   Date Value   09/26/2019 4.7   02/05/2018 4.4   03/30/2016 5.2             ( goal A1C is < 7)   No components found for: \"LABMICR\"  LDL Cholesterol (mg/dL)   Date Value   09/26/2019 100       (goal LDL is <100)   AST (U/L)   Date Value   03/14/2022 33 (H)     ALT (U/L)   Date Value   03/14/2022 15     BUN (mg/dL)   Date Value   03/14/2022 19     BP Readings from Last 3 Encounters:   10/06/23 130/83   02/16/23 122/74   03/14/22 130/80          (goal 120/80)    All

## 2023-10-24 RX ORDER — LORAZEPAM 0.5 MG/1
TABLET ORAL
Qty: 20 TABLET | OUTPATIENT
Start: 2023-10-24

## 2023-10-24 RX ORDER — HYDROXYZINE HYDROCHLORIDE 25 MG/1
25 TABLET, FILM COATED ORAL 3 TIMES DAILY PRN
Qty: 30 TABLET | Refills: 11 | Status: SHIPPED | OUTPATIENT
Start: 2023-10-24

## 2023-11-06 DIAGNOSIS — F41.1 GAD (GENERALIZED ANXIETY DISORDER): ICD-10-CM

## 2023-11-06 NOTE — TELEPHONE ENCOUNTER
..Request for   Requested Prescriptions     Pending Prescriptions Disp Refills    LORazepam (ATIVAN) 0.5 MG tablet [Pharmacy Med Name: LORazepam 0.5 MG TABLET] 20 tablet      Sig: TAKE 1 TABLET BY MOUTH TWICE DAILY AS NEEDED FOR ANXIETY FOR UP TO TEN DAYS. MAX DAILY AMOUNT: 2 TABLETS    .      Please review and e-scribe to pharmacy listed in chart if appropriate. Thank you.      Last Visit Date: 10/6/2023  Next Visit Date: 12/1/2023    Future Appointments   Date Time Provider Department Center   12/1/2023  3:00 PM Asia Amaya MD [facility]       Health Maintenance   Topic Date Due    Hepatitis B vaccine (1 of 3 - 3-dose series) Never done    COVID-19 Vaccine (1) Never done    Cervical cancer screen  11/27/2021    Flu vaccine (1) 08/01/2023    Depression Monitoring  02/16/2024    Lipids  09/26/2024    DTaP/Tdap/Td vaccine (2 - Td or Tdap) 10/07/2026    Hepatitis C screen  Completed    HIV screen  Completed    Hepatitis A vaccine  Aged Out    Hib vaccine  Aged Out    HPV vaccine  Aged Out    Meningococcal (ACWY) vaccine  Aged Out    Pneumococcal 0-64 years Vaccine  Aged Out    Varicella vaccine  Discontinued       Hemoglobin A1C (%)   Date Value   09/26/2019 4.7   02/05/2018 4.4   03/30/2016 5.2             ( goal A1C is < 7)   No components found for: \"LABMICR\"  LDL Cholesterol (mg/dL)   Date Value   09/26/2019 100       (goal LDL is <100)   AST (U/L)   Date Value   03/14/2022 33 (H)     ALT (U/L)   Date Value   03/14/2022 15     BUN (mg/dL)   Date Value   03/14/2022 19     BP Readings from Last 3 Encounters:   10/06/23 130/83   02/16/23 122/74   03/14/22 130/80          (goal 120/80)    All Future Testing planned in [facility]  Lab Frequency Next Occurrence   XR ELBOW LEFT (MIN 3 VIEWS) Once 02/16/2023   SERGIO DIGITAL SCREEN W OR WO CAD BILATERAL Once 08/12/2023   Basic Metabolic Panel Once 10/06/2023         Patient Active Problem List:     HUE (generalized anxiety disorder)     Panic disorder     History of

## 2023-11-09 ENCOUNTER — TELEMEDICINE (OUTPATIENT)
Dept: INTERNAL MEDICINE | Age: 43
End: 2023-11-09
Payer: COMMERCIAL

## 2023-11-09 DIAGNOSIS — U07.1 PNEUMONIA DUE TO COVID-19 VIRUS: ICD-10-CM

## 2023-11-09 DIAGNOSIS — J20.9 ACUTE BRONCHITIS, UNSPECIFIED ORGANISM: ICD-10-CM

## 2023-11-09 DIAGNOSIS — F41.1 GAD (GENERALIZED ANXIETY DISORDER): Primary | ICD-10-CM

## 2023-11-09 DIAGNOSIS — J12.82 PNEUMONIA DUE TO COVID-19 VIRUS: ICD-10-CM

## 2023-11-09 PROCEDURE — 99443 PR PHYS/QHP TELEPHONE EVALUATION 21-30 MIN: CPT | Performed by: INTERNAL MEDICINE

## 2023-11-09 RX ORDER — METHYLPREDNISOLONE 4 MG/1
TABLET ORAL
COMMUNITY
Start: 2023-11-08

## 2023-11-09 RX ORDER — AZITHROMYCIN 250 MG/1
TABLET, FILM COATED ORAL
COMMUNITY
Start: 2023-11-08

## 2023-11-09 RX ORDER — LORAZEPAM 0.5 MG/1
TABLET ORAL
Qty: 20 TABLET | OUTPATIENT
Start: 2023-11-09

## 2023-11-09 RX ORDER — ONDANSETRON 4 MG/1
TABLET, ORALLY DISINTEGRATING ORAL
COMMUNITY
Start: 2023-11-04

## 2023-11-09 ASSESSMENT — ENCOUNTER SYMPTOMS
COUGH: 1
SPUTUM PRODUCTION: 1
SORE THROAT: 1
SHORTNESS OF BREATH: 1

## 2023-11-09 NOTE — PROGRESS NOTES
Marko Gibbs, was evaluated through a synchronous (real-time) audio-video encounter. The patient (or guardian if applicable) is aware that this is a billable service, which includes applicable co-pays. This Virtual Visit was conducted with patient's (and/or legal guardian's) consent. Patient identification was verified, and a caregiver was present when appropriate. The patient was located at Home: [address] [place] [address]  Provider was located at [facility] (Appt Dept): [address]      Marko Gibbs (:  1980) is a Established patient, presenting virtually for evaluation of the following:    Assessment & Plan   Below is the assessment and plan developed based on review of pertinent history, physical exam, labs, studies, and medications. 1. HUE (generalized anxiety disorder)  2. Pneumonia due to COVID-19 virus  3. Acute bronchitis, unspecified organism    Return in about 2 months (around 2024). Subjective   Cough  This is a new problem. The current episode started in the past 7 days. The problem has been unchanged. The problem occurs constantly. The cough is Productive of sputum. Associated symptoms include chest pain, ear congestion, ear pain, postnasal drip, a sore throat and shortness of breath. Nothing aggravates the symptoms. Risk factors for lung disease include smoking/tobacco exposure. She has tried oral steroids, OTC cough suppressant and OTC inhaler for the symptoms. The treatment provided mild relief. Her past medical history is significant for bronchitis. Shortness of Breath  This is a new problem. The current episode started in the past 7 days. The problem occurs daily. The problem has been waxing and waning. Associated symptoms include chest pain, ear pain, a sore throat and sputum production. The symptoms are aggravated by emotional upset. She has tried oral steroids and OTC cough suppressants for the symptoms.      Review of Systems   HENT:

## 2023-11-16 ENCOUNTER — OFFICE VISIT (OUTPATIENT)
Dept: PRIMARY CARE CLINIC | Age: 43
End: 2023-11-16
Payer: COMMERCIAL

## 2023-11-16 VITALS
TEMPERATURE: 98.1 F | HEART RATE: 78 BPM | SYSTOLIC BLOOD PRESSURE: 140 MMHG | WEIGHT: 182 LBS | DIASTOLIC BLOOD PRESSURE: 98 MMHG | BODY MASS INDEX: 30.32 KG/M2 | OXYGEN SATURATION: 98 % | HEIGHT: 65 IN

## 2023-11-16 DIAGNOSIS — R11.0 NAUSEA: ICD-10-CM

## 2023-11-16 DIAGNOSIS — K21.9 GASTROESOPHAGEAL REFLUX DISEASE WITHOUT ESOPHAGITIS: ICD-10-CM

## 2023-11-16 DIAGNOSIS — U07.1 COVID-19: Primary | ICD-10-CM

## 2023-11-16 PROCEDURE — 3080F DIAST BP >= 90 MM HG: CPT | Performed by: NURSE PRACTITIONER

## 2023-11-16 PROCEDURE — 3077F SYST BP >= 140 MM HG: CPT | Performed by: NURSE PRACTITIONER

## 2023-11-16 PROCEDURE — 99213 OFFICE O/P EST LOW 20 MIN: CPT | Performed by: NURSE PRACTITIONER

## 2023-11-16 RX ORDER — DEXAMETHASONE 6 MG/1
6 TABLET ORAL
Qty: 5 TABLET | Refills: 0 | Status: SHIPPED | OUTPATIENT
Start: 2023-11-16 | End: 2023-11-21

## 2023-11-16 RX ORDER — PROMETHAZINE HYDROCHLORIDE 25 MG/1
25 TABLET ORAL 4 TIMES DAILY PRN
Qty: 20 TABLET | Refills: 0 | Status: SHIPPED | OUTPATIENT
Start: 2023-11-16 | End: 2023-11-23

## 2023-11-16 ASSESSMENT — ENCOUNTER SYMPTOMS
NAUSEA: 1
VOICE CHANGE: 0
WHEEZING: 0
EYE REDNESS: 0
SORE THROAT: 1
SINUS PRESSURE: 0
DIARRHEA: 1
CHEST TIGHTNESS: 0
COUGH: 1
EYE DISCHARGE: 0
CONSTIPATION: 0
ABDOMINAL PAIN: 1
VOMITING: 1
SHORTNESS OF BREATH: 1

## 2023-11-16 NOTE — TELEPHONE ENCOUNTER
.. Request for   Requested Prescriptions     Pending Prescriptions Disp Refills    fluticasone (FLONASE) 50 MCG/ACT nasal spray [Pharmacy Med Name: FLUTICASONE PROP 50 MCG SPRAY]       Sig: SPRAY TWO SPRAYS IN EACH NOSTRIL ONCE DAILY    . Please review and e-scribe to pharmacy listed in chart if appropriate. Thank you.       Last Visit Date: 11/9/2023  Next Visit Date: 12/1/2023    Future Appointments   Date Time Provider [address]   12/1/2023  3:00 PM Jl Thomas MD [facility]  [address] Maintenance   Topic Date Due    Hepatitis B vaccine (1 of 3 - 3-dose series) Never done    COVID-19 Vaccine (1) Never done    Cervical cancer screen  11/27/2021    Flu vaccine (1) 08/01/2023    Depression Monitoring  02/16/2024    Lipids  09/26/2024    DTaP/Tdap/Td vaccine (2 - Td or Tdap) 10/07/2026    Hepatitis C screen  Completed    HIV screen  Completed    Hepatitis A vaccine  Aged Out    Hib vaccine  Aged Out    HPV vaccine  Aged Out    Meningococcal (ACWY) vaccine  Aged Out    Pneumococcal 0-64 years Vaccine  Aged Out    Varicella vaccine  Discontinued       Hemoglobin A1C (%)   Date Value   09/26/2019 4.7   02/05/2018 4.4   03/30/2016 5.2             ( goal A1C is < 7)   No components found for: \"LABMICR\"  LDL Cholesterol (mg/dL)   Date Value   09/26/2019 100       (goal LDL is <100)   AST (U/L)   Date Value   03/14/2022 33 (H)     ALT (U/L)   Date Value   03/14/2022 15     BUN (mg/dL)   Date Value   03/14/2022 19     BP Readings from Last 3 Encounters:   11/16/23 (!) 140/98   10/06/23 130/83   02/16/23 122/74          (goal 120/80)    All Future Testing planned in [facility]  Lab Frequency Next Occurrence   XR ELBOW LEFT (MIN 3 VIEWS) Once 02/16/2023   SERGIO DIGITAL SCREEN W OR WO CAD BILATERAL Once 05/80/3840   Basic Metabolic Panel Once 64/66/7708         Patient Active Problem List:     HUE (generalized anxiety disorder)     Panic disorder     History of heroin abuse ([address])     Essential hypertension

## 2023-11-16 NOTE — PROGRESS NOTES
[address] [facility] [place] [address]  Dept: [phone]  Dept Fax: [phone]     Jude Shaw is a 37 y.o. female who presents to the urgent care today for her medicalconditions/complaints as noted below. Jude Shaw is c/o of Diarrhea (Sick for 3 weeks, coming and going. Positive Covid 11/14/2023. )    HPI:      Sinusitis  This is a new problem. Episode onset: 3 weeks ago, worsened over the last two days. The problem has been waxing and waning since onset. There has been no fever. Associated symptoms include congestion, coughing, headaches, shortness of breath (slight) and a sore throat. Pertinent negatives include no chills, ear pain, sinus pressure or sneezing. Treatments tried: azithromycin, prednisone, zofran. The treatment provided no relief. 11/9 treated for bronchitis with azithromycin and prednisone. 11/14 started feeling worse, took at home COVID test, which came back positive. Past Medical History:   Diagnosis Date    Abnormal Pap smear of cervix     Anxiety     Chest pain     pt states had normal stress test    Depression     Drug abuse ([facility])     opiates    Heartburn     HSV-2 infection     Hypertension     Menorrhagia     Osteoarthritis     Pneumonia due to Staphylococcus aureus [facility])     PTSD (post-traumatic stress disorder)     Trichomonosis 4/18/16    on pap 4/18/16      Current Outpatient Medications   Medication Sig Dispense Refill    dexamethasone (DECADRON) 6 MG tablet Take 1 tablet by mouth daily (with breakfast) for 5 days 5 tablet 0    nirmatrelvir/ritonavir 300/100 (PAXLOVID) 20 x 150 MG & 10 x 100MG TBPK Take 3 tablets (two 150 mg nirmatrelvir and one 100 mg ritonavir tablets) by mouth every 12 hours for 5 days.  30 tablet 0    promethazine (PHENERGAN) 25 MG tablet Take 1 tablet by mouth 4 times daily as needed for Nausea 20 tablet 0    ondansetron (ZOFRAN-ODT) 4 MG disintegrating

## 2023-11-17 RX ORDER — FLUTICASONE PROPIONATE 50 MCG
SPRAY, SUSPENSION (ML) NASAL
Qty: 2 EACH | Refills: 11 | Status: SHIPPED | OUTPATIENT
Start: 2023-11-17

## 2023-11-20 RX ORDER — OMEPRAZOLE 20 MG/1
20 CAPSULE, DELAYED RELEASE ORAL 2 TIMES DAILY
Qty: 60 CAPSULE | Refills: 5 | Status: SHIPPED | OUTPATIENT
Start: 2023-11-20

## 2023-11-20 NOTE — TELEPHONE ENCOUNTER
.. Request for   Requested Prescriptions     Pending Prescriptions Disp Refills    omeprazole (PRILOSEC) 20 MG delayed release capsule [Pharmacy Med Name: OMEPRAZOLE DR 20 MG CAPSULE] 60 capsule      Sig: TAKE ONE CAPSULE BY MOUTH TWICE A DAY    . Please review and e-scribe to pharmacy listed in chart if appropriate. Thank you.       Last Visit Date: 11/9/2023  Next Visit Date: 12/1/2023    Future Appointments   Date Time Provider [address]   12/1/2023  3:00 PM Alejandro Whitley MD [facility]  [address] Maintenance   Topic Date Due    Hepatitis B vaccine (1 of 3 - 3-dose series) Never done    COVID-19 Vaccine (1) Never done    Cervical cancer screen  11/27/2021    Flu vaccine (1) 08/01/2023    Depression Monitoring  02/16/2024    Lipids  09/26/2024    DTaP/Tdap/Td vaccine (2 - Td or Tdap) 10/07/2026    Hepatitis C screen  Completed    HIV screen  Completed    Hepatitis A vaccine  Aged Out    Hib vaccine  Aged Out    HPV vaccine  Aged Out    Meningococcal (ACWY) vaccine  Aged Out    Pneumococcal 0-64 years Vaccine  Aged Out    Varicella vaccine  Discontinued       Hemoglobin A1C (%)   Date Value   09/26/2019 4.7   02/05/2018 4.4   03/30/2016 5.2             ( goal A1C is < 7)   No components found for: \"LABMICR\"  LDL Cholesterol (mg/dL)   Date Value   09/26/2019 100       (goal LDL is <100)   AST (U/L)   Date Value   03/14/2022 33 (H)     ALT (U/L)   Date Value   03/14/2022 15     BUN (mg/dL)   Date Value   03/14/2022 19     BP Readings from Last 3 Encounters:   11/16/23 (!) 140/98   10/06/23 130/83   02/16/23 122/74          (goal 120/80)    All Future Testing planned in [facility]  Lab Frequency Next Occurrence   XR ELBOW LEFT (MIN 3 VIEWS) Once 02/16/2023   SERGIO DIGITAL SCREEN W OR WO CAD BILATERAL Once 23/20/2709   Basic Metabolic Panel Once 98/22/5638         Patient Active Problem List:     HUE (generalized anxiety disorder)     Panic disorder     History of heroin abuse ([address])     Essential

## 2023-11-21 ENCOUNTER — OFFICE VISIT (OUTPATIENT)
Dept: PRIMARY CARE CLINIC | Age: 43
End: 2023-11-21
Payer: COMMERCIAL

## 2023-11-21 VITALS
TEMPERATURE: 97.4 F | SYSTOLIC BLOOD PRESSURE: 131 MMHG | DIASTOLIC BLOOD PRESSURE: 91 MMHG | OXYGEN SATURATION: 97 % | HEART RATE: 60 BPM

## 2023-11-21 DIAGNOSIS — F06.4 ANXIETY DISORDER DUE TO COVID-19: ICD-10-CM

## 2023-11-21 DIAGNOSIS — U07.1 ANXIETY DISORDER DUE TO COVID-19: ICD-10-CM

## 2023-11-21 DIAGNOSIS — T50.905A ADVERSE EFFECT OF DRUG, INITIAL ENCOUNTER: Primary | ICD-10-CM

## 2023-11-21 PROCEDURE — 99213 OFFICE O/P EST LOW 20 MIN: CPT | Performed by: NURSE PRACTITIONER

## 2023-11-21 PROCEDURE — 3080F DIAST BP >= 90 MM HG: CPT | Performed by: NURSE PRACTITIONER

## 2023-11-21 PROCEDURE — 3075F SYST BP GE 130 - 139MM HG: CPT | Performed by: NURSE PRACTITIONER

## 2023-11-21 RX ORDER — ALPRAZOLAM 0.5 MG/1
0.5 TABLET ORAL DAILY PRN
Qty: 5 TABLET | Refills: 0 | Status: SHIPPED | OUTPATIENT
Start: 2023-11-21 | End: 2023-11-26

## 2023-11-21 RX ORDER — PREDNISONE 20 MG/1
40 TABLET ORAL DAILY
Qty: 10 TABLET | Refills: 0 | Status: SHIPPED | OUTPATIENT
Start: 2023-11-21 | End: 2023-11-26

## 2023-11-21 ASSESSMENT — ENCOUNTER SYMPTOMS
VOICE CHANGE: 0
COUGH: 0
SHORTNESS OF BREATH: 0
CHEST TIGHTNESS: 0
EYE REDNESS: 0
SINUS PRESSURE: 0
SORE THROAT: 0
WHEEZING: 0
EYE DISCHARGE: 0

## 2023-11-21 NOTE — PROGRESS NOTES
[address] [facility]  [address] [place] [address] Road  [address] [place] [id number]  Dept: [phone]  Dept Fax: [phone]     Deshaun Grant is a 37 y.o. female who presents to the urgent care today for her medicalconditions/complaints as noted below. Deshaun Grant is c/o of Medication Reaction (Rash, swelling, dizzy, fatigued; pt states the new symptoms started when she started the Paxlovid )    HPI:      Rash  This is a new problem. The current episode started in the past 7 days. The problem has been gradually worsening since onset. Location: bilateral forearms. The rash is characterized by pain and redness. Associated with: Paxlovid. Associated symptoms include fatigue. Pertinent negatives include no congestion, cough, fever, shortness of breath or sore throat. 11/16 treated with paxlovid and dexamethasone for COVID. States that she started developing worsening symptoms a few days after starting the meds. Past Medical History:   Diagnosis Date    Abnormal Pap smear of cervix     Anxiety     Chest pain     pt states had normal stress test    Depression     Drug abuse ([facility])     opiates    Heartburn     HSV-2 infection     Hypertension     Menorrhagia     Osteoarthritis     Pneumonia due to Staphylococcus aureus ([facility])     PTSD (post-traumatic stress disorder)     Trichomonosis 4/18/16    on pap 4/18/16      Current Outpatient Medications   Medication Sig Dispense Refill    predniSONE (DELTASONE) 20 MG tablet Take 2 tablets by mouth daily for 5 days 10 tablet 0    ALPRAZolam (XANAX) 0.5 MG tablet Take 1 tablet by mouth daily as needed for Sleep or Anxiety for up to 5 days.  Max Daily Amount: 0.5 mg 5 tablet 0    omeprazole (PRILOSEC) 20 MG delayed release capsule TAKE ONE CAPSULE BY MOUTH TWICE A DAY 60 capsule 5    fluticasone (FLONASE) 50 MCG/ACT nasal spray SPRAY TWO SPRAYS IN EACH NOSTRIL ONCE DAILY 2 each 11    dexamethasone (DECADRON) 6 MG tablet Take 1

## 2023-11-28 ENCOUNTER — TELEPHONE (OUTPATIENT)
Dept: PRIMARY CARE CLINIC | Age: 43
End: 2023-11-28

## 2023-11-28 ENCOUNTER — HOSPITAL ENCOUNTER (OUTPATIENT)
Age: 43
Setting detail: SPECIMEN
Discharge: HOME | End: 2023-11-28

## 2023-11-28 DIAGNOSIS — I10 ESSENTIAL HYPERTENSION: ICD-10-CM

## 2023-11-28 DIAGNOSIS — F41.1 GAD (GENERALIZED ANXIETY DISORDER): ICD-10-CM

## 2023-11-28 LAB
ANION GAP SERPL CALCULATED.3IONS-SCNC: 10 MMOL/L (ref 9–17)
BUN SERPL-MCNC: 19 MG/DL (ref 6–20)
CALCIUM SERPL-MCNC: 9 MG/DL (ref 8.6–10.4)
CHLORIDE SERPL-SCNC: 102 MMOL/L (ref 98–107)
CO2 SERPL-SCNC: 20 MMOL/L (ref 20–31)
CREAT SERPL-MCNC: 0.6 MG/DL (ref 0.5–0.9)
GFR SERPL CREATININE-BSD FRML MDRD: >60 ML/MIN/1.73M2
GLUCOSE SERPL-MCNC: 117 MG/DL (ref 70–99)
POTASSIUM SERPL-SCNC: 4.2 MMOL/L (ref 3.7–5.3)
SODIUM SERPL-SCNC: 132 MMOL/L (ref 135–144)

## 2023-11-28 NOTE — TELEPHONE ENCOUNTER
Patient states that the headache she is having is not going away and was wondering if this could be a reaction from steroid. Pt states it feels like she is underwater. Please advise.

## 2023-11-28 NOTE — TELEPHONE ENCOUNTER
Consider stopping the steroid if it seems like the steroid may be causing worsening. I would recommend that she try an OTC nasal spray like flonase to help with fluid behind the inner ears, which can cause the muffled hearing issue.

## 2023-12-01 ENCOUNTER — HOSPITAL ENCOUNTER (EMERGENCY)
Age: 43
Discharge: HOME | End: 2023-12-01
Attending: EMERGENCY MEDICINE
Payer: COMMERCIAL

## 2023-12-01 VITALS
HEART RATE: 95 BPM | DIASTOLIC BLOOD PRESSURE: 108 MMHG | SYSTOLIC BLOOD PRESSURE: 154 MMHG | OXYGEN SATURATION: 99 % | RESPIRATION RATE: 18 BRPM | TEMPERATURE: 97.9 F

## 2023-12-01 DIAGNOSIS — G93.32 POST-COVID CHRONIC FATIGUE: ICD-10-CM

## 2023-12-01 DIAGNOSIS — U09.9 POST-COVID CHRONIC FATIGUE: ICD-10-CM

## 2023-12-01 DIAGNOSIS — J06.9 VIRAL UPPER RESPIRATORY TRACT INFECTION: Primary | ICD-10-CM

## 2023-12-01 LAB — GLUCOSE BLD-MCNC: 106 MG/DL (ref 65–105)

## 2023-12-01 PROCEDURE — 82947 ASSAY GLUCOSE BLOOD QUANT: CPT

## 2023-12-01 PROCEDURE — 99283 EMERGENCY DEPT VISIT LOW MDM: CPT

## 2023-12-01 RX ORDER — GUAIFENESIN 600 MG/1
600 TABLET, EXTENDED RELEASE ORAL 2 TIMES DAILY
Qty: 30 TABLET | Refills: 0 | Status: SHIPPED | OUTPATIENT
Start: 2023-12-01 | End: 2023-12-16

## 2023-12-01 ASSESSMENT — PAIN SCALES - GENERAL: PAINLEVEL_OUTOF10: 7

## 2023-12-01 ASSESSMENT — PAIN - FUNCTIONAL ASSESSMENT: PAIN_FUNCTIONAL_ASSESSMENT: 0-10

## 2023-12-01 NOTE — DISCHARGE INSTRUCTIONS
You were seen in the emergency department for ongoing cough, fatigue, congestion. This is most likely postviral in nature. You are being prescribed a short course of Mucinex, take this as needed. It is recommended that you not take the steroids any longer, they are likely contributing to your headache. Schedule appointment to be seen by your primary care doctor soon as possible. It appears they have an appointment later today, ensure you make it to the appointment. Return to the emergency room if you have any worsening chest pain, shortness of breath, fevers, rash, or any other acute concern.

## 2023-12-01 NOTE — ED NOTES
Patient registered with the social security number given to nurse. Patient verified identity with 2 identifying factors.        Chang Abraham RN  12/01/23 1105

## 2023-12-01 NOTE — ED NOTES
Pt with multi concerns  Pt denied any new concerns \"no on and off for 34 days\"      Tatiana Xavier  12/01/23 4166

## 2023-12-01 NOTE — ED NOTES
Pt to ED via triage with complaints of swelling all over, headache, blurred vision and \"feeling like fluid is on my brain\". Pt tested positive for covid 2 months ago, since then she has had symptoms, doctors placed her on a steroid recently and she has noted these new symptoms within the past couple of days (specifically the swelling and blurred vision). Pt does state the headache has been on and off for the past couple of months. Pt is on stretcher with call light, IV is being attempted with ultrasound, no other needs stated.       Varun Stack RN  12/01/23 5168

## 2023-12-11 ASSESSMENT — ENCOUNTER SYMPTOMS
COUGH: 1
ABDOMINAL PAIN: 0
SHORTNESS OF BREATH: 0
VOMITING: 0
NAUSEA: 0

## 2024-01-09 ENCOUNTER — HOSPITAL ENCOUNTER (INPATIENT)
Age: 44
LOS: 3 days | Discharge: HOME | End: 2024-01-13
Attending: EMERGENCY MEDICINE | Admitting: STUDENT IN AN ORGANIZED HEALTH CARE EDUCATION/TRAINING PROGRAM
Payer: COMMERCIAL

## 2024-01-09 ENCOUNTER — APPOINTMENT (OUTPATIENT)
Dept: GENERAL RADIOLOGY | Age: 44
End: 2024-01-09
Payer: COMMERCIAL

## 2024-01-09 DIAGNOSIS — R10.30 LOWER ABDOMINAL PAIN: ICD-10-CM

## 2024-01-09 DIAGNOSIS — K85.20 ALCOHOL-INDUCED ACUTE PANCREATITIS WITHOUT INFECTION OR NECROSIS: ICD-10-CM

## 2024-01-09 DIAGNOSIS — F41.1 GAD (GENERALIZED ANXIETY DISORDER): ICD-10-CM

## 2024-01-09 DIAGNOSIS — F10.20 ALCOHOL USE DISORDER, SEVERE, DEPENDENCE (HCC): ICD-10-CM

## 2024-01-09 DIAGNOSIS — R07.9 CHEST PAIN, UNSPECIFIED TYPE: Primary | ICD-10-CM

## 2024-01-09 LAB
BACTERIA URNS QL MICRO: NORMAL
BASOPHILS # BLD: 0.09 K/UL (ref 0–0.2)
BASOPHILS NFR BLD: 1 % (ref 0–2)
BILIRUB UR QL STRIP: NEGATIVE
BNP SERPL-MCNC: 39 PG/ML
CASTS #/AREA URNS LPF: NORMAL /LPF (ref 0–8)
CLARITY UR: CLEAR
COLOR UR: YELLOW
D DIMER PPP FEU-MCNC: 0.81 UG/ML FEU (ref 0–0.57)
EOSINOPHIL # BLD: <0.03 K/UL (ref 0–0.44)
EOSINOPHILS RELATIVE PERCENT: 0 % (ref 1–4)
EPI CELLS #/AREA URNS HPF: NORMAL /HPF (ref 0–5)
ERYTHROCYTE [DISTWIDTH] IN BLOOD BY AUTOMATED COUNT: 13.1 % (ref 11.8–14.4)
GLUCOSE UR STRIP-MCNC: NEGATIVE MG/DL
HCG SERPL QL: NEGATIVE
HCT VFR BLD AUTO: 38 % (ref 36.3–47.1)
HGB BLD-MCNC: 13.6 G/DL (ref 11.9–15.1)
HGB UR QL STRIP.AUTO: ABNORMAL
IMM GRANULOCYTES # BLD AUTO: <0.03 K/UL (ref 0–0.3)
IMM GRANULOCYTES NFR BLD: 0 %
KETONES UR STRIP-MCNC: ABNORMAL MG/DL
LEUKOCYTE ESTERASE UR QL STRIP: ABNORMAL
LYMPHOCYTES NFR BLD: 1.56 K/UL (ref 1.1–3.7)
LYMPHOCYTES RELATIVE PERCENT: 17 % (ref 24–43)
MCH RBC QN AUTO: 32.9 PG (ref 25.2–33.5)
MCHC RBC AUTO-ENTMCNC: 35.8 G/DL (ref 28.4–34.8)
MCV RBC AUTO: 91.8 FL (ref 82.6–102.9)
MONOCYTES NFR BLD: 0.43 K/UL (ref 0.1–1.2)
MONOCYTES NFR BLD: 5 % (ref 3–12)
NEUTROPHILS NFR BLD: 77 % (ref 36–65)
NEUTS SEG NFR BLD: 7.27 K/UL (ref 1.5–8.1)
NITRITE UR QL STRIP: NEGATIVE
NRBC BLD-RTO: 0 PER 100 WBC
PH UR STRIP: 7.5 [PH] (ref 5–8)
PLATELET # BLD AUTO: 180 K/UL (ref 138–453)
PMV BLD AUTO: 10 FL (ref 8.1–13.5)
PROT UR STRIP-MCNC: ABNORMAL MG/DL
RBC # BLD AUTO: 4.14 M/UL (ref 3.95–5.11)
RBC #/AREA URNS HPF: NORMAL /HPF (ref 0–4)
SARS-COV-2 RDRP RESP QL NAA+PROBE: NOT DETECTED
SP GR UR STRIP: 1.02 (ref 1–1.03)
SPECIMEN DESCRIPTION: NORMAL
TROPONIN I SERPL HS-MCNC: <6 NG/L (ref 0–14)
TROPONIN I SERPL HS-MCNC: <6 NG/L (ref 0–14)
UROBILINOGEN UR STRIP-ACNC: NORMAL EU/DL (ref 0–1)
WBC #/AREA URNS HPF: NORMAL /HPF (ref 0–5)
WBC OTHER # BLD: 9.4 K/UL (ref 3.5–11.3)

## 2024-01-09 PROCEDURE — 87186 SC STD MICRODIL/AGAR DIL: CPT

## 2024-01-09 PROCEDURE — 2500000003 HC RX 250 WO HCPCS: Performed by: STUDENT IN AN ORGANIZED HEALTH CARE EDUCATION/TRAINING PROGRAM

## 2024-01-09 PROCEDURE — 84478 ASSAY OF TRIGLYCERIDES: CPT

## 2024-01-09 PROCEDURE — 6360000002 HC RX W HCPCS: Performed by: STUDENT IN AN ORGANIZED HEALTH CARE EDUCATION/TRAINING PROGRAM

## 2024-01-09 PROCEDURE — 96361 HYDRATE IV INFUSION ADD-ON: CPT

## 2024-01-09 PROCEDURE — 85379 FIBRIN DEGRADATION QUANT: CPT

## 2024-01-09 PROCEDURE — 96375 TX/PRO/DX INJ NEW DRUG ADDON: CPT

## 2024-01-09 PROCEDURE — 87086 URINE CULTURE/COLONY COUNT: CPT

## 2024-01-09 PROCEDURE — 83880 ASSAY OF NATRIURETIC PEPTIDE: CPT

## 2024-01-09 PROCEDURE — 93005 ELECTROCARDIOGRAM TRACING: CPT | Performed by: STUDENT IN AN ORGANIZED HEALTH CARE EDUCATION/TRAINING PROGRAM

## 2024-01-09 PROCEDURE — 71046 X-RAY EXAM CHEST 2 VIEWS: CPT

## 2024-01-09 PROCEDURE — 87077 CULTURE AEROBIC IDENTIFY: CPT

## 2024-01-09 PROCEDURE — 81001 URINALYSIS AUTO W/SCOPE: CPT

## 2024-01-09 PROCEDURE — 80053 COMPREHEN METABOLIC PANEL: CPT

## 2024-01-09 PROCEDURE — 87635 SARS-COV-2 COVID-19 AMP PRB: CPT

## 2024-01-09 PROCEDURE — 96374 THER/PROPH/DIAG INJ IV PUSH: CPT

## 2024-01-09 PROCEDURE — 2580000003 HC RX 258: Performed by: STUDENT IN AN ORGANIZED HEALTH CARE EDUCATION/TRAINING PROGRAM

## 2024-01-09 PROCEDURE — 84703 CHORIONIC GONADOTROPIN ASSAY: CPT

## 2024-01-09 PROCEDURE — 84484 ASSAY OF TROPONIN QUANT: CPT

## 2024-01-09 PROCEDURE — 83690 ASSAY OF LIPASE: CPT

## 2024-01-09 PROCEDURE — 85025 COMPLETE CBC W/AUTO DIFF WBC: CPT

## 2024-01-09 PROCEDURE — A4216 STERILE WATER/SALINE, 10 ML: HCPCS | Performed by: STUDENT IN AN ORGANIZED HEALTH CARE EDUCATION/TRAINING PROGRAM

## 2024-01-09 PROCEDURE — 99285 EMERGENCY DEPT VISIT HI MDM: CPT

## 2024-01-09 PROCEDURE — 96372 THER/PROPH/DIAG INJ SC/IM: CPT

## 2024-01-09 PROCEDURE — 6370000000 HC RX 637 (ALT 250 FOR IP): Performed by: STUDENT IN AN ORGANIZED HEALTH CARE EDUCATION/TRAINING PROGRAM

## 2024-01-09 RX ORDER — KETOROLAC TROMETHAMINE 30 MG/ML
30 INJECTION, SOLUTION INTRAMUSCULAR; INTRAVENOUS ONCE
Status: COMPLETED | OUTPATIENT
Start: 2024-01-09 | End: 2024-01-09

## 2024-01-09 RX ORDER — MAGNESIUM HYDROXIDE/ALUMINUM HYDROXICE/SIMETHICONE 120; 1200; 1200 MG/30ML; MG/30ML; MG/30ML
30 SUSPENSION ORAL ONCE
Status: COMPLETED | OUTPATIENT
Start: 2024-01-09 | End: 2024-01-09

## 2024-01-09 RX ORDER — ONDANSETRON 2 MG/ML
4 INJECTION INTRAMUSCULAR; INTRAVENOUS ONCE
Status: COMPLETED | OUTPATIENT
Start: 2024-01-09 | End: 2024-01-09

## 2024-01-09 RX ORDER — 0.9 % SODIUM CHLORIDE 0.9 %
1000 INTRAVENOUS SOLUTION INTRAVENOUS ONCE
Status: COMPLETED | OUTPATIENT
Start: 2024-01-09 | End: 2024-01-10

## 2024-01-09 RX ORDER — 0.9 % SODIUM CHLORIDE 0.9 %
1000 INTRAVENOUS SOLUTION INTRAVENOUS ONCE
Status: COMPLETED | OUTPATIENT
Start: 2024-01-09 | End: 2024-01-09

## 2024-01-09 RX ADMIN — KETOROLAC TROMETHAMINE 30 MG: 30 INJECTION, SOLUTION INTRAMUSCULAR; INTRAVENOUS at 21:09

## 2024-01-09 RX ADMIN — ONDANSETRON 4 MG: 2 INJECTION INTRAMUSCULAR; INTRAVENOUS at 21:09

## 2024-01-09 RX ADMIN — SODIUM CHLORIDE 1000 ML: 9 INJECTION, SOLUTION INTRAVENOUS at 21:11

## 2024-01-09 RX ADMIN — SODIUM CHLORIDE 1000 ML: 9 INJECTION, SOLUTION INTRAVENOUS at 23:27

## 2024-01-09 RX ADMIN — ALUMINUM HYDROXIDE, MAGNESIUM HYDROXIDE, AND SIMETHICONE 30 ML: 200; 200; 20 SUSPENSION ORAL at 23:26

## 2024-01-09 RX ADMIN — FAMOTIDINE 20 MG: 10 INJECTION, SOLUTION INTRAVENOUS at 21:09

## 2024-01-09 ASSESSMENT — ENCOUNTER SYMPTOMS
NAUSEA: 1
SHORTNESS OF BREATH: 0
VOMITING: 1
COUGH: 0
ABDOMINAL PAIN: 1
DIARRHEA: 0
CONSTIPATION: 0

## 2024-01-09 ASSESSMENT — PAIN DESCRIPTION - LOCATION
LOCATION: ABDOMEN;CHEST
LOCATION: CHEST
LOCATION: ABDOMEN

## 2024-01-09 ASSESSMENT — PAIN SCALES - GENERAL
PAINLEVEL_OUTOF10: 9

## 2024-01-09 ASSESSMENT — PAIN DESCRIPTION - DESCRIPTORS
DESCRIPTORS: ACHING
DESCRIPTORS: BURNING

## 2024-01-09 ASSESSMENT — PAIN - FUNCTIONAL ASSESSMENT: PAIN_FUNCTIONAL_ASSESSMENT: 0-10

## 2024-01-10 ENCOUNTER — APPOINTMENT (OUTPATIENT)
Dept: ULTRASOUND IMAGING | Age: 44
End: 2024-01-10
Payer: COMMERCIAL

## 2024-01-10 ENCOUNTER — APPOINTMENT (OUTPATIENT)
Dept: CT IMAGING | Age: 44
End: 2024-01-10
Payer: COMMERCIAL

## 2024-01-10 PROBLEM — K85.90 ACUTE PANCREATITIS WITHOUT INFECTION OR NECROSIS: Status: ACTIVE | Noted: 2024-01-10

## 2024-01-10 PROBLEM — F10.20 ALCOHOL USE DISORDER, SEVERE, DEPENDENCE (HCC): Status: ACTIVE | Noted: 2024-01-10

## 2024-01-10 PROBLEM — K85.20 ALCOHOL-INDUCED ACUTE PANCREATITIS WITHOUT INFECTION OR NECROSIS: Status: ACTIVE | Noted: 2024-01-10

## 2024-01-10 LAB
ALBUMIN SERPL-MCNC: 3.4 G/DL (ref 3.5–5.2)
ALP SERPL-CCNC: 153 U/L (ref 35–104)
ALT SERPL-CCNC: 115 U/L (ref 5–33)
ANION GAP SERPL CALCULATED.3IONS-SCNC: 18 MMOL/L (ref 9–17)
AST SERPL-CCNC: 170 U/L
BILIRUB SERPL-MCNC: 1 MG/DL (ref 0.3–1.2)
BUN SERPL-MCNC: 12 MG/DL (ref 6–20)
CALCIUM SERPL-MCNC: 7.3 MG/DL (ref 8.6–10.4)
CHLORIDE SERPL-SCNC: 97 MMOL/L (ref 98–107)
CO2 SERPL-SCNC: 17 MMOL/L (ref 20–31)
CREAT SERPL-MCNC: 0.7 MG/DL (ref 0.5–0.9)
EKG ATRIAL RATE: 100 BPM
EKG P AXIS: 61 DEGREES
EKG P-R INTERVAL: 178 MS
EKG Q-T INTERVAL: 362 MS
EKG QRS DURATION: 70 MS
EKG QTC CALCULATION (BAZETT): 466 MS
EKG R AXIS: 92 DEGREES
EKG T AXIS: 55 DEGREES
EKG VENTRICULAR RATE: 100 BPM
GFR SERPL CREATININE-BSD FRML MDRD: >60 ML/MIN/1.73M2
GLUCOSE SERPL-MCNC: 104 MG/DL (ref 70–99)
LACTIC ACID, WHOLE BLOOD: 2.8 MMOL/L (ref 0.7–2.1)
LIPASE SERPL-CCNC: 884 U/L (ref 13–60)
POTASSIUM SERPL-SCNC: 4.7 MMOL/L (ref 3.7–5.3)
PROT SERPL-MCNC: 5.9 G/DL (ref 6.4–8.3)
SODIUM SERPL-SCNC: 132 MMOL/L (ref 135–144)
TRIGL SERPL-MCNC: 608 MG/DL

## 2024-01-10 PROCEDURE — 6360000002 HC RX W HCPCS: Performed by: NURSE PRACTITIONER

## 2024-01-10 PROCEDURE — 6370000000 HC RX 637 (ALT 250 FOR IP): Performed by: STUDENT IN AN ORGANIZED HEALTH CARE EDUCATION/TRAINING PROGRAM

## 2024-01-10 PROCEDURE — 2580000003 HC RX 258: Performed by: NURSE PRACTITIONER

## 2024-01-10 PROCEDURE — 6370000000 HC RX 637 (ALT 250 FOR IP): Performed by: INTERNAL MEDICINE

## 2024-01-10 PROCEDURE — 74177 CT ABD & PELVIS W/CONTRAST: CPT

## 2024-01-10 PROCEDURE — 94761 N-INVAS EAR/PLS OXIMETRY MLT: CPT

## 2024-01-10 PROCEDURE — 6360000002 HC RX W HCPCS: Performed by: INTERNAL MEDICINE

## 2024-01-10 PROCEDURE — 76705 ECHO EXAM OF ABDOMEN: CPT

## 2024-01-10 PROCEDURE — 6360000004 HC RX CONTRAST MEDICATION: Performed by: STUDENT IN AN ORGANIZED HEALTH CARE EDUCATION/TRAINING PROGRAM

## 2024-01-10 PROCEDURE — 2580000003 HC RX 258: Performed by: STUDENT IN AN ORGANIZED HEALTH CARE EDUCATION/TRAINING PROGRAM

## 2024-01-10 PROCEDURE — 83605 ASSAY OF LACTIC ACID: CPT

## 2024-01-10 PROCEDURE — 2060000000 HC ICU INTERMEDIATE R&B

## 2024-01-10 PROCEDURE — 99222 1ST HOSP IP/OBS MODERATE 55: CPT | Performed by: INTERNAL MEDICINE

## 2024-01-10 PROCEDURE — 6360000002 HC RX W HCPCS: Performed by: STUDENT IN AN ORGANIZED HEALTH CARE EDUCATION/TRAINING PROGRAM

## 2024-01-10 PROCEDURE — 93010 ELECTROCARDIOGRAM REPORT: CPT | Performed by: INTERNAL MEDICINE

## 2024-01-10 RX ORDER — MAGNESIUM SULFATE 1 G/100ML
1000 INJECTION INTRAVENOUS PRN
Status: DISCONTINUED | OUTPATIENT
Start: 2024-01-10 | End: 2024-01-13 | Stop reason: HOSPADM

## 2024-01-10 RX ORDER — FENTANYL CITRATE 50 UG/ML
25 INJECTION, SOLUTION INTRAMUSCULAR; INTRAVENOUS
Status: DISCONTINUED | OUTPATIENT
Start: 2024-01-10 | End: 2024-01-13 | Stop reason: HOSPADM

## 2024-01-10 RX ORDER — ENOXAPARIN SODIUM 100 MG/ML
40 INJECTION SUBCUTANEOUS DAILY
Status: DISCONTINUED | OUTPATIENT
Start: 2024-01-10 | End: 2024-01-13 | Stop reason: HOSPADM

## 2024-01-10 RX ORDER — ONDANSETRON 4 MG/1
4 TABLET, ORALLY DISINTEGRATING ORAL EVERY 8 HOURS PRN
Status: DISCONTINUED | OUTPATIENT
Start: 2024-01-10 | End: 2024-01-13 | Stop reason: HOSPADM

## 2024-01-10 RX ORDER — LOSARTAN POTASSIUM AND HYDROCHLOROTHIAZIDE 12.5; 5 MG/1; MG/1
1 TABLET ORAL DAILY
COMMUNITY

## 2024-01-10 RX ORDER — SODIUM CHLORIDE 9 MG/ML
INJECTION, SOLUTION INTRAVENOUS CONTINUOUS
Status: DISCONTINUED | OUTPATIENT
Start: 2024-01-10 | End: 2024-01-11

## 2024-01-10 RX ORDER — LANOLIN ALCOHOL/MO/W.PET/CERES
100 CREAM (GRAM) TOPICAL DAILY
Status: DISCONTINUED | OUTPATIENT
Start: 2024-01-10 | End: 2024-01-13 | Stop reason: HOSPADM

## 2024-01-10 RX ORDER — FENTANYL CITRATE 50 UG/ML
50 INJECTION, SOLUTION INTRAMUSCULAR; INTRAVENOUS ONCE
Status: COMPLETED | OUTPATIENT
Start: 2024-01-10 | End: 2024-01-10

## 2024-01-10 RX ORDER — CEPHALEXIN 500 MG/1
500 CAPSULE ORAL ONCE
Status: COMPLETED | OUTPATIENT
Start: 2024-01-10 | End: 2024-01-10

## 2024-01-10 RX ORDER — SODIUM CHLORIDE 0.9 % (FLUSH) 0.9 %
5-40 SYRINGE (ML) INJECTION PRN
Status: DISCONTINUED | OUTPATIENT
Start: 2024-01-10 | End: 2024-01-13 | Stop reason: HOSPADM

## 2024-01-10 RX ORDER — SODIUM CHLORIDE 9 MG/ML
INJECTION, SOLUTION INTRAVENOUS PRN
Status: DISCONTINUED | OUTPATIENT
Start: 2024-01-10 | End: 2024-01-13 | Stop reason: HOSPADM

## 2024-01-10 RX ORDER — POTASSIUM CHLORIDE 7.45 MG/ML
10 INJECTION INTRAVENOUS PRN
Status: DISCONTINUED | OUTPATIENT
Start: 2024-01-10 | End: 2024-01-13 | Stop reason: HOSPADM

## 2024-01-10 RX ORDER — ONDANSETRON 2 MG/ML
4 INJECTION INTRAMUSCULAR; INTRAVENOUS EVERY 6 HOURS PRN
Status: DISCONTINUED | OUTPATIENT
Start: 2024-01-10 | End: 2024-01-13 | Stop reason: HOSPADM

## 2024-01-10 RX ORDER — SODIUM CHLORIDE 0.9 % (FLUSH) 0.9 %
5-40 SYRINGE (ML) INJECTION EVERY 12 HOURS SCHEDULED
Status: DISCONTINUED | OUTPATIENT
Start: 2024-01-10 | End: 2024-01-13 | Stop reason: HOSPADM

## 2024-01-10 RX ADMIN — FENTANYL CITRATE 25 MCG: 50 INJECTION, SOLUTION INTRAMUSCULAR; INTRAVENOUS at 05:29

## 2024-01-10 RX ADMIN — SODIUM CHLORIDE: 9 INJECTION, SOLUTION INTRAVENOUS at 17:11

## 2024-01-10 RX ADMIN — HYDROMORPHONE HYDROCHLORIDE 0.5 MG: 1 INJECTION, SOLUTION INTRAMUSCULAR; INTRAVENOUS; SUBCUTANEOUS at 23:08

## 2024-01-10 RX ADMIN — ONDANSETRON 4 MG: 2 INJECTION INTRAMUSCULAR; INTRAVENOUS at 13:49

## 2024-01-10 RX ADMIN — Medication 100 MG: at 11:56

## 2024-01-10 RX ADMIN — FENTANYL CITRATE 50 MCG: 50 INJECTION, SOLUTION INTRAMUSCULAR; INTRAVENOUS at 01:57

## 2024-01-10 RX ADMIN — SODIUM CHLORIDE: 9 INJECTION, SOLUTION INTRAVENOUS at 23:13

## 2024-01-10 RX ADMIN — SODIUM CHLORIDE, PRESERVATIVE FREE 10 ML: 5 INJECTION INTRAVENOUS at 19:44

## 2024-01-10 RX ADMIN — Medication 1000 MG: at 11:49

## 2024-01-10 RX ADMIN — IOPAMIDOL 75 ML: 755 INJECTION, SOLUTION INTRAVENOUS at 00:40

## 2024-01-10 RX ADMIN — HYDROMORPHONE HYDROCHLORIDE 0.5 MG: 1 INJECTION, SOLUTION INTRAMUSCULAR; INTRAVENOUS; SUBCUTANEOUS at 08:59

## 2024-01-10 RX ADMIN — SODIUM CHLORIDE: 9 INJECTION, SOLUTION INTRAVENOUS at 02:41

## 2024-01-10 RX ADMIN — HYDROMORPHONE HYDROCHLORIDE 0.5 MG: 1 INJECTION, SOLUTION INTRAMUSCULAR; INTRAVENOUS; SUBCUTANEOUS at 19:14

## 2024-01-10 RX ADMIN — HYDROMORPHONE HYDROCHLORIDE 0.5 MG: 1 INJECTION, SOLUTION INTRAMUSCULAR; INTRAVENOUS; SUBCUTANEOUS at 13:50

## 2024-01-10 RX ADMIN — HYDROMORPHONE HYDROCHLORIDE 0.5 MG: 1 INJECTION, SOLUTION INTRAMUSCULAR; INTRAVENOUS; SUBCUTANEOUS at 02:52

## 2024-01-10 RX ADMIN — PIPERACILLIN AND TAZOBACTAM 3375 MG: 3; .375 INJECTION, POWDER, FOR SOLUTION INTRAVENOUS at 02:11

## 2024-01-10 RX ADMIN — ENOXAPARIN SODIUM 40 MG: 100 INJECTION SUBCUTANEOUS at 11:56

## 2024-01-10 RX ADMIN — FENTANYL CITRATE 25 MCG: 50 INJECTION, SOLUTION INTRAMUSCULAR; INTRAVENOUS at 11:48

## 2024-01-10 RX ADMIN — CEPHALEXIN 500 MG: 500 CAPSULE ORAL at 01:11

## 2024-01-10 ASSESSMENT — PAIN DESCRIPTION - DESCRIPTORS
DESCRIPTORS: ACHING
DESCRIPTORS: ACHING
DESCRIPTORS: SHARP
DESCRIPTORS: ACHING

## 2024-01-10 ASSESSMENT — PAIN SCALES - GENERAL
PAINLEVEL_OUTOF10: 9
PAINLEVEL_OUTOF10: 8
PAINLEVEL_OUTOF10: 8
PAINLEVEL_OUTOF10: 7
PAINLEVEL_OUTOF10: 9
PAINLEVEL_OUTOF10: 8
PAINLEVEL_OUTOF10: 7
PAINLEVEL_OUTOF10: 8
PAINLEVEL_OUTOF10: 7
PAINLEVEL_OUTOF10: 10
PAINLEVEL_OUTOF10: 9

## 2024-01-10 ASSESSMENT — PAIN DESCRIPTION - LOCATION
LOCATION: ABDOMEN

## 2024-01-10 ASSESSMENT — PAIN DESCRIPTION - ORIENTATION: ORIENTATION: LEFT

## 2024-01-10 NOTE — ED PROVIDER NOTES
[facility] ED  Emergency Department Encounter  Emergency Medicine Resident     Pt Name:Marily Sarah  MRN: [id number]  Birthdate 1980  Date of evaluation: 1/9/24  PCP:  Asia Amaya MD  Note Started: 8:49 PM EST      CHIEF COMPLAINT       Chief Complaint   Patient presents with    Abdominal Pain    Chest Pain       HISTORY OF PRESENT ILLNESS  (Location/Symptom, Timing/Onset, Context/Setting, Quality, Duration, Modifying Factors, Severity.)      Marily Sarah is a 43 y.o. female who presents with Patient with intermittent symptoms going on for 1 month.  Chest pain that radiates to left side of her chest described as sharp not worse with anything intermittent.  No nausea or sweating associated with it.  Patient also having intermittent lower abdominal pain nausea vomiting and flank pain.  She is having bowel movements passing gas.  States she has had some blood in her urine today.  No vaginal discharge or bleeding.  No prior history of kidney stones.  No pain with urination.  No prior abdominal surgeries.      PAST MEDICAL / SURGICAL / SOCIAL / FAMILY HISTORY      has a past medical history of Abnormal Pap smear of cervix, Anxiety, Chest pain, Depression, Drug abuse ([facility]), Heartburn, HSV-2 infection, Hypertension, Menorrhagia, Osteoarthritis, Pneumonia due to Staphylococcus aureus ([facility]), PTSD (post-traumatic stress disorder), and Trichomonosis.     has a past surgical history that includes Tubal ligation; other surgical history (06/21/2016); Endometrial ablation; and US BREAST BIOPSY W LOC DEVICE 1ST LESION LEFT (Left, 3/3/2021).    Social History     Socioeconomic History    Marital status: Single     Spouse name: Not on file    Number of children: Not on file    Years of education: Not on file    Highest education level: Not on file   Occupational History    Not on file   Tobacco Use    Smoking status: Former     Current packs/day: 0.50     Average packs/day: 0.5 packs/day for 18.0 years (9.0  4/25/23   Asia Amaya MD   diclofenac (VOLTAREN) 50 MG EC tablet TAKE ONE TABLET BY MOUTH TWICE A DAY  Patient not taking: Reported on 12/1/2023 3/23/23   Rebecca Vaca MD   buPROPion (WELLBUTRIN XL) 300 MG extended release tablet TAKE ONE TABLET BY MOUTH EVERY MORNING  Patient not taking: Reported on 12/1/2023 10/7/21   Asia Amaya MD       REVIEW OF SYSTEMS       Review of Systems   Constitutional:  Negative for chills and fever.   Respiratory:  Negative for cough and shortness of breath.    Cardiovascular:  Positive for chest pain.   Gastrointestinal:  Positive for abdominal pain, nausea and vomiting. Negative for constipation and diarrhea.   Genitourinary:  Positive for flank pain and hematuria. Negative for dysuria, pelvic pain, vaginal bleeding, vaginal discharge and vaginal pain.       PHYSICAL EXAM      INITIAL VITALS:   /80   Pulse 92   Temp 97.9 °F (36.6 °C) (Oral)   Resp 18   Ht 1.651 m (5' 5\")   Wt 83.9 kg (185 lb)   SpO2 97%   BMI 30.79 kg/m²     Physical Exam  Constitutional:       General: She is not in acute distress.  Cardiovascular:      Rate and Rhythm: Tachycardia present.      Pulses: Normal pulses.      Heart sounds: Normal heart sounds.   Pulmonary:      Effort: Pulmonary effort is normal.      Breath sounds: Normal breath sounds.   Abdominal:      General: There is no distension.      Palpations: Abdomen is soft.      Tenderness: There is right CVA tenderness and left CVA tenderness. There is no guarding or rebound.      Comments: Mild lower abdominal tenderness no tenderness in the inguinal or suprapubic region   Musculoskeletal:      Right lower leg: No edema.      Left lower leg: No edema.           DDX/DIAGNOSTIC RESULTS / EMERGENCY DEPARTMENT COURSE / MDM     Medical Decision Making  Here chest pain abdominal pain chest pain low suspicion for acute coronary syndrome heart score 3 EKG nonischemic no elevation in troponins no signs of heart failure.  No pneumothorax

## 2024-01-10 NOTE — ED PROVIDER NOTES
[facility] ED  eMERGENCY dEPARTMENT eNCOUnter   Attending Attestation     Pt Name: Marily Sarah  MRN: [id number]  Birthdate 1980  Date of evaluation: 1/9/24       Marily Sarah is a 43 y.o. female who presents with Abdominal Pain and Chest Pain      History: Patient presents abdominal pain and chest pain.  Patient said the abdominal pain and chest pain for almost 2 months.  Patient says that it is worse now.  Patient said that she was seen by her doctor yesterday they were concerned that she may need an enema so she did an enema and she has only become worse.  Patient says she is been taking an acids and Pepto-Bismol for her symptoms .      Exam: Heart rate and rhythm are regular.  Lungs are clear to auscultation bilaterally.  Abdomen is soft,  diffusely tender, worsening over the umbilicus.     Plan for labs, consider CT. Will dispo based on findings and clinical status.     I performed a history and physical examination of the patient and discussed management with the resident. I reviewed the resident’s note and agree with the documented findings and plan of care. Any areas of disagreement are noted on the chart. I was personally present for the key portions of any procedures. I have documented in the chart those procedures where I was not present during the key portions. I have personally reviewed all images and agree with the resident's interpretation. I have reviewed the emergency nurses triage note. I agree with the chief complaint, past medical history, past surgical history, allergies, medications, social and family history as documented unless otherwise noted below. Documentation of the HPI, Physical Exam and Medical Decision Making performed by medical students or scribes is based on my personal performance of the HPI, PE and MDM. For Phys Assistant/ Nurse Practitioner cases/documentation I have had a face to face evaluation of this patient and have completed at least one if not all key

## 2024-01-10 NOTE — ED NOTES
Pt resting on stretcher, in NAD, RR even and unlabored. Pt updated on plan of care. Will continue plan of care. Call light within reach.

## 2024-01-10 NOTE — ED NOTES
The following labs labeled with pt sticker and tubed to lab:     [x] Blue     [x] Lavender   [] on ice  [x] Green/yellow  [x] Green/black [] on ice  [] Yellow  [] Red  [] Pink      [x] COVID-19 swab    [x] Rapid  [] PCR  [] Flu Swab  [] Strep Swab  [] Peds Viral Panel     [x] Urine Sample  [] Pelvic Cultures  [] Blood Cultures   [] Wound Cultures

## 2024-01-10 NOTE — ED NOTES
Chase, from lab called and said that patient's lab specimens are too lipemic for the machines at [place].   States lab specimens will have delayed results due to this.   Dr. Patel notified.

## 2024-01-10 NOTE — ED NOTES
The following labs labeled with pt sticker and tubed to lab:     [] Blue     [] Lavender   [] on ice  [] Green/yellow  [x] Green/black [] on ice  [] Yellow  [] Red  [] Pink      [] COVID-19 swab    [] Rapid  [] PCR  [] Flu Swab  [] Strep Swab  [] Peds Viral Panel     [] Urine Sample  [] Pelvic Cultures  [] Blood Cultures   [] Wound Cultures

## 2024-01-10 NOTE — ED PROVIDER NOTES
scattered in multiple loops of small  bowel, suggestive of mild small bowel ileus. [GC]   0144 Zosyn added due to acute pancreatitis with phlegmon surrounding the tail of the pancreas [GC]   0150 Treating pancreatitis with Fentanyl . (Allergy to morphine). Has received 2 L normal saline bolus. Checking lactic acid [GC]   0158 Intermed consulted for admission. [GC]   0251 Lactic Acid, Whole Blood(!): 2.8 [GC]      ED Course User Index  [GC] Reynold Morales DO  [ZE] Fabiano Patel DO       OUTSTANDING TASKS / RECOMMENDATIONS:    CT A/P  CMP, lipase     FINAL IMPRESSION:     1. Chest pain, unspecified type    2. Lower abdominal pain        DISPOSITION:         DISPOSITION:  []  Discharge   []  Transfer -    [x]  Admission -  Intermed   []  Against Medical Advice   []  Eloped   FOLLOW-UP: No follow-up provider specified.   DISCHARGE MEDICATIONS: New Prescriptions    No medications on file          Reynold Morales DO  Emergency Medicine Resident  [facility]

## 2024-01-10 NOTE — ED NOTES
ED to inpatient nurses report      Chief Complaint:  Chief Complaint   Patient presents with    Abdominal Pain    Chest Pain     Present to ED from: home    MOA:     LOC: alert and orientated to name, place, date  Mobility: Independent  Oxygen Baseline: RA    Current needs required: RA   Pending ED orders: none  Present condition: stable, requires frequent pain meds, NPO, ambulatory    Why did the patient come to the ED?     Pt presents to the ED with c/o of abdominal pain and chest pain  Pt states that she has been vomiting for couple of days  Seen PCP yesterday, did cleansing at home, took milk and magnesium  Pt states that she's taking omeprazole and tums everday but didn't help  Took motrin 400 mg this morning for the pain  Pt is known hypertensive     What is the plan? Admission, antibiotic therapy, pain control,hydration  Any procedures or intervention occur? Labs, CXR, Abdominal CT  Any safety concerns?? For any blood works, blood needs to be sent to [facility] for the appropriate machine    Mental Status:  Level of Consciousness: Alert (0)    Psych Assessment:   Psychosocial  Psychosocial (WDL): Within Defined Limits  Vital signs   Vitals:    01/10/24 0059 01/10/24 0112 01/10/24 0129 01/10/24 0211   BP: 118/84      Pulse: 94 99 94 92   Resp:    12   Temp:       TempSrc:       SpO2: 98% 99% 98% 99%   Weight:       Height:            Vitals:  Patient Vitals for the past 24 hrs:   BP Temp Temp src Pulse Resp SpO2 Height Weight   01/10/24 0211 -- -- -- 92 12 99 % -- --   01/10/24 0129 -- -- -- 94 -- 98 % -- --   01/10/24 0112 -- -- -- 99 -- 99 % -- --   01/10/24 0059 118/84 -- -- 94 -- 98 % -- --   01/10/24 0050 -- -- -- 96 -- 99 % -- --   01/10/24 0049 120/85 -- -- -- -- -- -- --   01/10/24 0029 113/74 -- -- 93 -- 96 % -- --   01/10/24 0018 119/80 -- -- 92 -- 97 % -- --   01/09/24 2317 111/86 -- -- 93 -- 98 % -- --   01/09/24 2047 122/87 97.9 °F (36.6 °C) Oral (!) 101 18 100 % 1.651 m (5' 5\") 83.9 kg (185 lb)  stones.  No pain with urination.  No prior abdominal surgeries.  Plan for labs imaging supportive care disposition pending [ZE]   Wed Chris 10, 2024   0048 S/o to PM [ZE]   0102 Assumed care from senior resident [GC]   0103 Keflex 500 mg ordered for UTI [GC]   0141 Checking triglyceride levels due to viscosity of blood and CT A/P read [GC]   0142 CT ABDOMEN PELVIS W IV CONTRAST Additional Contrast? None  IMPRESSION:  1. Acute pancreatitis with moderate inflammatory phlegmon surrounding the  tail of the pancreas. No definable focal abnormality within the pancreatic  parenchyma.  2. Hepatomegaly with moderate to marked diffuse fatty infiltration of the  liver.  3. No evidence of colitis or diverticulitis.  4. No evidence of obstructive uropathy.  5. No evidence of inguinal, femoral or ventral hernia.  6. Moderate degenerative disc disease at L5-S1 level without significant  stenosis  7. Small amount of gas with fluid levels scattered in multiple loops of small  bowel, suggestive of mild small bowel ileus. [GC]   0144 Zosyn added due to acute pancreatitis with phlegmon surrounding the tail of the pancreas [GC]   0150 Treating pancreatitis with Fentanyl . (Allergy to morphine). Has received 2 L normal saline bolus. Checking lactic acid [GC]   0158 Intermed consulted for admission. [GC]      ED Course User Index  [GC] Reynold Morales DO  [ZE] Fabiano Patel DO          Skin Assessment:        Pain Score:  Pain Assessment  Pain Assessment: 0-10  Pain Level: 9  Pain Location: Abdomen  Pain Descriptors: Aching  Response to Pain Intervention: Pain improved but above pain goal      SOCIAL HISTORY       Social History     Socioeconomic History    Marital status: Single   Tobacco Use    Smoking status: Former     Current packs/day: 0.50     Average packs/day: 0.5 packs/day for 18.0 years (9.0 ttl pk-yrs)     Types: Cigarettes    Smokeless tobacco: Never   Vaping Use    Vaping Use: Never used   Substance and Sexual Activity

## 2024-01-10 NOTE — ED PROVIDER NOTES
Faculty Sign-Out Attestation  Handoff taken on the following patient from prior Attending Physician: Christine    I was available and discussed any additional care issues that arose and coordinated the management plans with the resident(s) caring for the patient during my duty period. Any areas of disagreement with resident’s documentation of care or procedures are noted on the chart. I was personally present for the key portions of any/all procedures during my duty period. I have documented in the chart those procedures where I was not present during the key portions.    Cp / abd pain, years - for pe  Ct > pancreatitis,    admitted     Arturo Larkin,   01/10/24 1229

## 2024-01-10 NOTE — H&P
[facility]  Office: [phone]  Tyler Griffith DO, Kaz Hughes DO, Ludwig Carlos DO, Maurizio Arzate DO, Toy Walker MD, Oralia Mark MD, Vivienne Landry MD, Jeanette London MD,  Dudley Mckeon MD, Irma Hardin MD, Elodia Morales MD,  Jose De Jesus Subramanian DO, Prudence Renee MD, Brodie Solorzano MD, Ross rGiffith DO, Mercedes Olivas MD,  Alex Clemente DO, Abena Still MD, Asia Amaya MD, Rebecca Vaca MD, Corby Wooten MD,  Zain Queen MD, Naomy Obrien MD, Kelley Little MD, Paula Shepard MD, Christopher Russo MD, Ish Good MD, Eduard Vaughn DO, Dandy De Jesus DO, Cesilia Pardo MD,  Jordon Vargas MD, Shirley Waterhouse, CNP,  Fauzia Hassan, CNP, Stu Soriano, CNP,  Natalie Dalal, DNP, Antonia Romero, CNP, Emilia Gonzalez, CNP, Temi Murillo CNP, Alecia Jacob, CNP, Monae Ca, CNP, Kelle Velasco, PA-C, Johanny Perez PA-C, Dianne Hinson, CNP, Li García, CNS, Karla Hanna, CNP, Janessa Bradford, CNP, Tracy Schwab, CNP         [facility]   IN-PATIENT SERVICE   [facility]    HISTORY AND PHYSICAL EXAMINATION            Date:   1/10/2024  Patient name:  Marily Sarah  Date of admission:  1/9/2024  8:48 PM  MRN:   [id number]  Account:  [id number]  YOB: 1980  PCP:    Asia Amaya MD  Room:   25/25  Code Status:    Full Code    Chief Complaint:     Chief Complaint   Patient presents with    Abdominal Pain    Chest Pain       History Obtained From:     patient, electronic medical record    History of Present Illness:     Marily Sarah is a 43 y.o. Non- / non  female who presents with Abdominal Pain and Chest Pain   and is admitted to the hospital for the management of Alcohol-induced acute pancreatitis without infection or necrosis.    This 43 yof presents with abd pain with n/v.  She has not been able to hold anything down lately and recently developed diarrhea.  The abd pain is epigastric and sharp.  She has also  Can stop zosyn-just do few days rocephin for uti    Consultations:   IP CONSULT TO HOSPITALIST     Patient is admitted as inpatient status because of co-morbidities listed above, severity of signs and symptoms as outlined, requirement for current medical therapies and most importantly because of direct risk to patient if care not provided in a hospital setting.  Expected length of stay > 48 hours.    Maurizio Arzate DO  1/10/2024  9:38 AM    Copy sent to Asia Davis MD

## 2024-01-10 NOTE — ED NOTES
The following labs labeled with pt sticker and tubed to lab:     [] Blue     [] Lavender   [] on ice  [x] Green/yellow  [] Green/black [] on ice  [] Yellow  [] Red  [] Pink      [] COVID-19 swab    [] Rapid  [] PCR  [] Flu Swab  [] Strep Swab  [] Peds Viral Panel     [] Urine Sample  [] Pelvic Cultures  [] Blood Cultures   [] Wound Cultures

## 2024-01-10 NOTE — ED NOTES
Lab called to confirm they could run lactic acid level or if it needed to be sent to [facility].  Lab yariel, stated they could run sample in our ED.   .

## 2024-01-10 NOTE — ED NOTES
ED to inpatient nurses report      Chief Complaint:  Chief Complaint   Patient presents with    Abdominal Pain    Chest Pain     Present to ED from: home      MOA:     LOC: alert and orientated to name, place, date  Mobility: Independent  Oxygen Baseline: RA                  Current needs required: none   Pending ED orders: none  Present condition: stable      Why did the patient come to the ED? Abd pain   What is the plan? pancreatitis pain control   Any procedures or intervention occur? US  Any safety concerns??    Mental Status:  Level of Consciousness: Alert (0)    Psych Assessment:   Psychosocial  Psychosocial (WDL): Within Defined Limits  Vital signs   Vitals:    01/10/24 1321 01/10/24 1322 01/10/24 1323 01/10/24 1435   BP:    97/71   Pulse: 91 91 91 92   Resp: 20 19 21 16   Temp:       TempSrc:       SpO2:       Weight:       Height:            Vitals:  Patient Vitals for the past 24 hrs:   BP Temp Temp src Pulse Resp SpO2 Height Weight   01/10/24 1435 97/71 -- -- 92 16 -- -- --   01/10/24 1323 -- -- -- 91 21 -- -- --   01/10/24 1322 -- -- -- 91 19 -- -- --   01/10/24 1321 -- -- -- 91 20 -- -- --   01/10/24 1315 101/73 -- -- 91 18 -- -- --   01/10/24 1158 -- -- -- -- 18 96 % -- --   01/10/24 0900 -- -- -- 98 15 97 % -- --   01/10/24 0800 -- -- -- 89 21 97 % -- --   01/10/24 0715 -- -- -- 91 19 96 % -- --   01/10/24 0612 113/77 -- -- 90 18 97 % -- --   01/10/24 0557 104/79 -- -- 87 22 97 % -- --   01/10/24 0527 103/78 -- -- 88 19 98 % -- --   01/10/24 0501 123/74 -- -- 88 18 98 % -- --   01/10/24 0414 -- -- -- 84 16 96 % -- --   01/10/24 0400 110/77 -- -- 85 19 97 % -- --   01/10/24 0345 101/77 -- -- 87 17 96 % -- --   01/10/24 0330 99/75 -- -- 86 19 97 % -- --   01/10/24 0315 107/74 -- -- 87 18 97 % -- --   01/10/24 0245 110/79 -- -- 89 19 98 % -- --   01/10/24 0211 -- -- -- 92 12 99 % -- --   01/10/24 0129 -- -- -- 94 -- 98 % -- --   01/10/24 0112 -- -- -- 99 -- 99 % -- --   01/10/24 0059 118/84 -- -- 94 --  75 mL    cephALEXin (KEFLEX) capsule 500 mg     Order Specific Question:   Antimicrobial Indications     Answer:   Urinary Tract Infection    DISCONTD: piperacillin-tazobactam (ZOSYN) 3,375 mg in sodium chloride 0.9 % 50 mL IVPB (mini-bag)     Order Specific Question:   Antimicrobial Indications     Answer:   Intra-Abdominal Infection    fentaNYL (SUBLIMAZE) injection 50 mcg    0.9 % sodium chloride infusion    sodium chloride flush 0.9 % injection 5-40 mL    sodium chloride flush 0.9 % injection 5-40 mL    0.9 % sodium chloride infusion    potassium chloride 10 mEq/100 mL IVPB (Peripheral Line)    magnesium sulfate 1000 mg in dextrose 5% 100 mL IVPB    OR Linked Order Group     HYDROmorphone (DILAUDID) injection 0.25 mg     HYDROmorphone (DILAUDID) injection 0.5 mg    OR Linked Order Group     ondansetron (ZOFRAN-ODT) disintegrating tablet 4 mg     ondansetron (ZOFRAN) injection 4 mg    enoxaparin (LOVENOX) injection 40 mg     Order Specific Question:   Indication of Use     Answer:   Prophylaxis-DVT/PE    DISCONTD: piperacillin-tazobactam (ZOSYN) 3,375 mg in sodium chloride 0.9 % 50 mL IVPB (mini-bag)     Order Specific Question:   Antimicrobial Indications     Answer:   Intra-Abdominal Infection    fentaNYL (SUBLIMAZE) injection 25 mcg    cefTRIAXone (ROCEPHIN) 1000 mg in sterile water 10 mL IV syringe     Order Specific Question:   Antimicrobial Indications     Answer:   Urinary Tract Infection     Order Specific Question:   UTI duration of therapy     Answer:   3 days    thiamine tablet 100 mg       SURGICAL HISTORY       Past Surgical History:   Procedure Laterality Date    ENDOMETRIAL ABLATION      OTHER SURGICAL HISTORY  06/21/2016    hysteroscopy with Novasure    TUBAL LIGATION      US BREAST BIOPSY W LOC DEVICE 1ST LESION LEFT Left 3/3/2021    US BREAST NEEDLE BIOPSY LEFT 3/3/2021 [facility] ULTRASOUND       PAST MEDICAL HISTORY       Past Medical History:   Diagnosis Date    Abnormal Pap smear of cervix

## 2024-01-10 NOTE — ED NOTES
Lab called and stated patients blood is coming back unreadable on their system. Lab stated they could sent blood out to [facility]. Lab called referred to Dr. Patel

## 2024-01-10 NOTE — ED NOTES
Pt back to room from ultrasound, pt attached back to full cardiac monitor, call light in reach, verbalizes no needs at this time.

## 2024-01-10 NOTE — ED NOTES
The following labs were labeled with appropriate pt sticker and tubed to lab:     [x] Blue     [] Lavender   [] on ice  [] Green/yellow  [] Green/black [] on ice  [] Grey  [] on ice  [] Yellow  [] Red  [] Pink  [] Type/ Screen  [] ABG  [] VBG    [] COVID-19 swab    [] Rapid  [] PCR  [] Flu swab  [] Peds Viral Panel     [] Urine Sample  [] Fecal Sample  [] Pelvic Cultures  [] Blood Cultures  [] X 2  [] STREP Cultures  [] Wound Cultures

## 2024-01-10 NOTE — ED NOTES
Pt presents to the ED with c/o of abdominal pain and chest pain  Pt states that she has been vomiting for couple of days  Seen PCP yesterday, did cleansing at home, took milk and magnesium  Pt states that she's taking omeprazole and tums everday but didn't help  Took motrin 400 mg this morning for the pain  Pt is known hypertensive  Hooked pt to full cardiac monitor  Call light within reach  White board updated  Side rails up

## 2024-01-10 NOTE — ED NOTES
Lab called to add on triclyceride level to samples in lab and if they couldn't add them on to samples that we have in the ED, could they add the lab onto her blood that was sent to [facility].    Maegan, in lab stated she will try to run the triglyceride level here and if she cannot then she will contact [facility] and ensure it is added onto the labs that were sent there.

## 2024-01-10 NOTE — ED NOTES
ED to inpatient nurses report        Chief Complaint:      Chief Complaint   Patient presents with    Abdominal Pain    Chest Pain      Present to ED from: home     MOA:     LOC: alert and orientated to name, place, date  Mobility: Independent  Oxygen Baseline: RA                  Current needs required: RA   Pending ED orders: none  Present condition: stable, requires frequent pain meds, NPO, ambulatory,given dilaudid .5 mg last dose 0252H (can be given every 3 hrs PRN), fentanyl 25 mg every 2 hrs PRN (last dose 0529H)     Why did the patient come to the ED?     Pt presents to the ED with c/o of abdominal pain and chest pain  Pt states that she has been vomiting for couple of days  Seen PCP yesterday, did cleansing at home, took milk and magnesium  Pt states that she's taking omeprazole and tums everday but didn't help  Took motrin 400 mg this morning for the pain  Pt is known hypertensive      What is the plan? Intermed Admission, antibiotic therapy, pain control,continuous fluids  ml/hr   Any procedures or intervention occur? Labs, CXR, Abdominal CT  Any safety concerns?? For any blood works, blood needs to be sent to [facility] for the appropriate machine     Mental Status:  Level of Consciousness: Alert (0)    Psych Assessment:   Psychosocial  Psychosocial (WDL): Within Defined Limits  Vital signs   Vitals:    01/10/24 0330 01/10/24 0345 01/10/24 0400 01/10/24 0414   BP: 99/75 101/77 110/77    Pulse: 86 87 85 84   Resp: 19 17 19 16   Temp:       TempSrc:       SpO2: 97% 96% 97% 96%   Weight:       Height:            Vitals:  Patient Vitals for the past 24 hrs:   BP Temp Temp src Pulse Resp SpO2 Height Weight   01/10/24 0414 -- -- -- 84 16 96 % -- --   01/10/24 0400 110/77 -- -- 85 19 97 % -- --   01/10/24 0345 101/77 -- -- 87 17 96 % -- --   01/10/24 0330 99/75 -- -- 86 19 97 % -- --   01/10/24 0315 107/74 -- -- 87 18 97 % -- --   01/10/24 0245 110/79 -- -- 89 19 98 % -- --   01/10/24 0211 -- -- -- 92 12  Thyroid Disease Mother     Heart Disease Father     Other Father         deaf and legally blind    Breast Cancer Paternal Aunt     Breast Cancer Paternal Aunt     Other Other         aunts & grandmother & cousins father's have vaginal bleeding  issues    Cancer Paternal Cousin 32        cervical       ALLERGIES     Codeine, Morphine, and Sulfa antibiotics    CURRENT MEDICATIONS       Previous Medications    AZITHROMYCIN (ZITHROMAX) 250 MG TABLET        BUPROPION (WELLBUTRIN XL) 300 MG EXTENDED RELEASE TABLET    TAKE ONE TABLET BY MOUTH EVERY MORNING    CHOLECALCIFEROL (VITAMIN D3) 125 MCG (5000 UT) CAPS    TAKE ONE CAPSULE BY MOUTH DAILY    DICLOFENAC (VOLTAREN) 50 MG EC TABLET    TAKE ONE TABLET BY MOUTH TWICE A DAY    FLUOXETINE (PROZAC) 20 MG CAPSULE    Take 1 capsule by mouth daily    FLUTICASONE (FLONASE) 50 MCG/ACT NASAL SPRAY    SPRAY TWO SPRAYS IN EACH NOSTRIL ONCE DAILY    HYDROXYZINE HCL (ATARAX) 25 MG TABLET    TAKE 1 TABLET BY MOUTH THREE TIMES A DAY AS NEEDED    LISINOPRIL (PRINIVIL;ZESTRIL) 10 MG TABLET    TAKE ONE TABLET BY MOUTH DAILY    MAGNESIUM-OXIDE 400 (240 MG) MG TABLET    TAKE ONE TABLET BY MOUTH DAILY    METHYLPREDNISOLONE (MEDROL DOSEPACK) 4 MG TABLET        NIRMATRELVIR/RITONAVIR 300/100 (PAXLOVID) 20 X 150 MG & 10 X 100MG TBPK    Take 3 tablets (two 150 mg nirmatrelvir and one 100 mg ritonavir tablets) by mouth every 12 hours for 5 days.    OMEPRAZOLE (PRILOSEC) 20 MG DELAYED RELEASE CAPSULE    TAKE ONE CAPSULE BY MOUTH TWICE A DAY    ONDANSETRON (ZOFRAN-ODT) 4 MG DISINTEGRATING TABLET        PROPRANOLOL (INDERAL LA) 60 MG EXTENDED RELEASE CAPSULE    TAKE ONE CAPSULE BY MOUTH DAILY     Orders Placed This Encounter   Medications    famotidine (PEPCID) 20 mg in sodium chloride (PF) 0.9 % 10 mL injection    ketorolac (TORADOL) injection 30 mg    ondansetron (ZOFRAN) injection 4 mg    sodium chloride 0.9 % bolus 1,000 mL    sodium chloride 0.9 % bolus 1,000 mL    aluminum & magnesium

## 2024-01-10 NOTE — ED NOTES
ED to inpatient nurses report      Chief Complaint:  Chief Complaint   Patient presents with    Abdominal Pain    Chest Pain     Present to ED from: home     MOA:     LOC: alert and orientated to name, place, date  Mobility: Independent  Oxygen Baseline: RA    Current needs required: none   Pending ED orders: none  Present condition: stable     Why did the patient come to the ED? Abd pain   What is the plan? pancreatitis pain control   Any procedures or intervention occur? US  Any safety concerns??    Mental Status:  Level of Consciousness: Alert (0)    Psych Assessment:   Psychosocial  Psychosocial (WDL): Within Defined Limits  Vital signs   Vitals:    01/10/24 0612 01/10/24 0715 01/10/24 0800 01/10/24 0900   BP: 113/77      Pulse: 90 91 89 98   Resp: 18 19 21 15   Temp:       TempSrc:       SpO2: 97% 96% 97% 97%   Weight:       Height:            Vitals:  Patient Vitals for the past 24 hrs:   BP Temp Temp src Pulse Resp SpO2 Height Weight   01/10/24 0900 -- -- -- 98 15 97 % -- --   01/10/24 0800 -- -- -- 89 21 97 % -- --   01/10/24 0715 -- -- -- 91 19 96 % -- --   01/10/24 0612 113/77 -- -- 90 18 97 % -- --   01/10/24 0557 104/79 -- -- 87 22 97 % -- --   01/10/24 0527 103/78 -- -- 88 19 98 % -- --   01/10/24 0501 123/74 -- -- 88 18 98 % -- --   01/10/24 0414 -- -- -- 84 16 96 % -- --   01/10/24 0400 110/77 -- -- 85 19 97 % -- --   01/10/24 0345 101/77 -- -- 87 17 96 % -- --   01/10/24 0330 99/75 -- -- 86 19 97 % -- --   01/10/24 0315 107/74 -- -- 87 18 97 % -- --   01/10/24 0245 110/79 -- -- 89 19 98 % -- --   01/10/24 0211 -- -- -- 92 12 99 % -- --   01/10/24 0129 -- -- -- 94 -- 98 % -- --   01/10/24 0112 -- -- -- 99 -- 99 % -- --   01/10/24 0059 118/84 -- -- 94 -- 98 % -- --   01/10/24 0050 -- -- -- 96 -- 99 % -- --   01/10/24 0049 120/85 -- -- -- -- -- -- --   01/10/24 0029 113/74 -- -- 93 -- 96 % -- --   01/10/24 0018 119/80 -- -- 92 -- 97 % -- --   01/09/24 2317 111/86 -- -- 93 -- 98 % -- --   01/09/24 2047  PTSD (post-traumatic stress disorder)     Trichomonosis 4/18/16    on pap 4/18/16       Labs:  Labs Reviewed   CBC WITH AUTO DIFFERENTIAL - Abnormal; Notable for the following components:       Result Value    MCHC 35.8 (*)     Neutrophils % 77 (*)     Lymphocytes % 17 (*)     Eosinophils % 0 (*)     All other components within normal limits   URINALYSIS WITH REFLEX TO CULTURE - Abnormal; Notable for the following components:    Ketones, Urine SMALL (*)     Urine Hgb SMALL (*)     Protein, UA 1+ (*)     Leukocyte Esterase, Urine MODERATE (*)     All other components within normal limits   D-DIMER, QUANTITATIVE - Abnormal; Notable for the following components:    D-Dimer, Quant 0.81 (*)     All other components within normal limits   COMPREHENSIVE METABOLIC PANEL - Abnormal; Notable for the following components:    Sodium 132 (*)     Chloride 97 (*)     CO2 17 (*)     Anion Gap 18 (*)     Glucose 104 (*)     Calcium 7.3 (*)     Total Protein 5.9 (*)     Albumin 3.4 (*)     Alkaline Phosphatase 153 (*)      (*)      (*)     All other components within normal limits   LIPASE - Abnormal; Notable for the following components:    Lipase 884 (*)     All other components within normal limits   TRIGLYCERIDES - Abnormal; Notable for the following components:    Triglycerides 608 (*)     All other components within normal limits   LACTIC ACID - Abnormal; Notable for the following components:    Lactic Acid, Whole Blood 2.8 (*)     All other components within normal limits   COVID-19, RAPID   CULTURE, URINE   HCG, SERUM, QUALITATIVE   TROPONIN   TROPONIN   BRAIN NATRIURETIC PEPTIDE   MICROSCOPIC URINALYSIS   PREVIOUS SPECIMEN   PREVIOUS SPECIMEN   PREVIOUS SPECIMEN   SPECIMEN REJECTION   SPECIMEN REJECTION       Electronically signed by DANIA MURPHY RN on 1/10/2024 at 11:56 AM

## 2024-01-10 NOTE — ED NOTES
Pillow and additional blanket provided to patient.    Dr. Morales notified of patient having additional questions.   Dr. Morales at bedside to answer patient questions.

## 2024-01-10 NOTE — ED NOTES
Lab called for clairification on why lab specimens were not running properly on our machines.   Maegan from lab stated that patient's blood is too lypemic and to run the samples, an \"ultrafuser\"  is needed which we do not have at [facility].   Lab called a stat  to transfer samples to [place] where they have the appropriate machine.   Lab stated there was initially a new tech working which is why every sample we sent down were not able to be ran.   Resident updated.

## 2024-01-10 NOTE — ED NOTES
The following labs labeled with pt sticker and tubed to lab by ABBIE Alston LPN:     [] Blue     [] Lavender   [] on ice  [x] Green/yellow  [] Green/black [] on ice  [] Yellow  [] Red  [] Pink      [] COVID-19 swab    [] Rapid  [] PCR  [] Flu Swab  [] Strep Swab  [] Peds Viral Panel     [] Urine Sample  [] Pelvic Cultures  [] Blood Cultures   [] Wound Cultures

## 2024-01-10 NOTE — PROGRESS NOTES
Pt arrived to room 403 from ED with all belongings. Vital signs stable: HR 99, /88, RR 18, SpO2 97% on room air, temp 98.5F. Provider notified of patients arrival and current vitals. She reports she has 8/10 abdominal pain but is satisfied with pain control at this time.

## 2024-01-11 LAB
ALBUMIN SERPL-MCNC: 2.9 G/DL (ref 3.5–5.2)
ALBUMIN/GLOB SERPL: 1.1 {RATIO} (ref 1–2.5)
ALP SERPL-CCNC: 133 U/L (ref 35–104)
ALT SERPL-CCNC: 69 U/L (ref 5–33)
ANION GAP SERPL CALCULATED.3IONS-SCNC: 11 MMOL/L (ref 9–17)
ANION GAP SERPL CALCULATED.3IONS-SCNC: 14 MMOL/L (ref 9–17)
ANION GAP SERPL CALCULATED.3IONS-SCNC: 33 MMOL/L (ref 9–17)
AST SERPL-CCNC: 71 U/L
BILIRUB SERPL-MCNC: 1 MG/DL (ref 0.3–1.2)
BUN SERPL-MCNC: 2 MG/DL (ref 6–20)
BUN SERPL-MCNC: 3 MG/DL (ref 6–20)
BUN SERPL-MCNC: 4 MG/DL (ref 6–20)
CA-I BLD-SCNC: 0.98 MMOL/L (ref 1.13–1.33)
CALCIUM SERPL-MCNC: 5.2 MG/DL (ref 8.6–10.4)
CALCIUM SERPL-MCNC: 6.5 MG/DL (ref 8.6–10.4)
CALCIUM SERPL-MCNC: 6.6 MG/DL (ref 8.6–10.4)
CHLORIDE SERPL-SCNC: 103 MMOL/L (ref 98–107)
CHLORIDE SERPL-SCNC: 105 MMOL/L (ref 98–107)
CHLORIDE SERPL-SCNC: 107 MMOL/L (ref 98–107)
CO2 SERPL-SCNC: 12 MMOL/L (ref 20–31)
CO2 SERPL-SCNC: 15 MMOL/L (ref 20–31)
CO2 SERPL-SCNC: 21 MMOL/L (ref 20–31)
CREAT SERPL-MCNC: 0.3 MG/DL (ref 0.5–0.9)
CREAT SERPL-MCNC: 0.5 MG/DL (ref 0.5–0.9)
CREAT SERPL-MCNC: 0.5 MG/DL (ref 0.5–0.9)
GFR SERPL CREATININE-BSD FRML MDRD: >60 ML/MIN/1.73M2
GLUCOSE BLD-MCNC: 101 MG/DL (ref 65–105)
GLUCOSE BLD-MCNC: 102 MG/DL (ref 65–105)
GLUCOSE BLD-MCNC: 103 MG/DL (ref 65–105)
GLUCOSE BLD-MCNC: 106 MG/DL (ref 65–105)
GLUCOSE BLD-MCNC: 85 MG/DL (ref 65–105)
GLUCOSE BLD-MCNC: 86 MG/DL (ref 65–105)
GLUCOSE BLD-MCNC: 95 MG/DL (ref 65–105)
GLUCOSE BLD-MCNC: 96 MG/DL (ref 65–105)
GLUCOSE BLD-MCNC: 96 MG/DL (ref 65–105)
GLUCOSE SERPL-MCNC: 101 MG/DL (ref 70–99)
GLUCOSE SERPL-MCNC: 72 MG/DL (ref 70–99)
GLUCOSE SERPL-MCNC: 84 MG/DL (ref 70–99)
INR PPP: 1
LIPASE SERPL-CCNC: 135 U/L (ref 13–60)
MAGNESIUM SERPL-MCNC: 2.3 MG/DL (ref 1.6–2.6)
MICROORGANISM SPEC CULT: ABNORMAL
PHOSPHATE SERPL-MCNC: 1.5 MG/DL (ref 2.6–4.5)
POTASSIUM SERPL-SCNC: 2.7 MMOL/L (ref 3.7–5.3)
POTASSIUM SERPL-SCNC: 3.5 MMOL/L (ref 3.7–5.3)
POTASSIUM SERPL-SCNC: 3.6 MMOL/L (ref 3.7–5.3)
PROT SERPL-MCNC: 5.5 G/DL (ref 6.4–8.3)
PROTHROMBIN TIME: 13.3 SEC (ref 11.7–14.9)
SODIUM SERPL-SCNC: 134 MMOL/L (ref 135–144)
SODIUM SERPL-SCNC: 135 MMOL/L (ref 135–144)
SODIUM SERPL-SCNC: 152 MMOL/L (ref 135–144)
SPECIMEN DESCRIPTION: ABNORMAL
TRIGL SERPL-MCNC: 334 MG/DL
TRIGL SERPL-MCNC: 650 MG/DL

## 2024-01-11 PROCEDURE — 6370000000 HC RX 637 (ALT 250 FOR IP): Performed by: INTERNAL MEDICINE

## 2024-01-11 PROCEDURE — 83690 ASSAY OF LIPASE: CPT

## 2024-01-11 PROCEDURE — 97161 PT EVAL LOW COMPLEX 20 MIN: CPT

## 2024-01-11 PROCEDURE — 99232 SBSQ HOSP IP/OBS MODERATE 35: CPT | Performed by: STUDENT IN AN ORGANIZED HEALTH CARE EDUCATION/TRAINING PROGRAM

## 2024-01-11 PROCEDURE — 83735 ASSAY OF MAGNESIUM: CPT

## 2024-01-11 PROCEDURE — 84100 ASSAY OF PHOSPHORUS: CPT

## 2024-01-11 PROCEDURE — 6370000000 HC RX 637 (ALT 250 FOR IP): Performed by: STUDENT IN AN ORGANIZED HEALTH CARE EDUCATION/TRAINING PROGRAM

## 2024-01-11 PROCEDURE — 2500000003 HC RX 250 WO HCPCS: Performed by: STUDENT IN AN ORGANIZED HEALTH CARE EDUCATION/TRAINING PROGRAM

## 2024-01-11 PROCEDURE — 82947 ASSAY GLUCOSE BLOOD QUANT: CPT

## 2024-01-11 PROCEDURE — 6360000002 HC RX W HCPCS: Performed by: STUDENT IN AN ORGANIZED HEALTH CARE EDUCATION/TRAINING PROGRAM

## 2024-01-11 PROCEDURE — 2580000003 HC RX 258: Performed by: NURSE PRACTITIONER

## 2024-01-11 PROCEDURE — C9113 INJ PANTOPRAZOLE SODIUM, VIA: HCPCS | Performed by: STUDENT IN AN ORGANIZED HEALTH CARE EDUCATION/TRAINING PROGRAM

## 2024-01-11 PROCEDURE — 6360000002 HC RX W HCPCS: Performed by: NURSE PRACTITIONER

## 2024-01-11 PROCEDURE — 80048 BASIC METABOLIC PNL TOTAL CA: CPT

## 2024-01-11 PROCEDURE — 2060000000 HC ICU INTERMEDIATE R&B

## 2024-01-11 PROCEDURE — 97530 THERAPEUTIC ACTIVITIES: CPT

## 2024-01-11 PROCEDURE — 80053 COMPREHEN METABOLIC PANEL: CPT

## 2024-01-11 PROCEDURE — 84478 ASSAY OF TRIGLYCERIDES: CPT

## 2024-01-11 PROCEDURE — 6360000002 HC RX W HCPCS: Performed by: INTERNAL MEDICINE

## 2024-01-11 PROCEDURE — 85610 PROTHROMBIN TIME: CPT

## 2024-01-11 PROCEDURE — 82330 ASSAY OF CALCIUM: CPT

## 2024-01-11 PROCEDURE — 2580000003 HC RX 258: Performed by: STUDENT IN AN ORGANIZED HEALTH CARE EDUCATION/TRAINING PROGRAM

## 2024-01-11 PROCEDURE — 36415 COLL VENOUS BLD VENIPUNCTURE: CPT

## 2024-01-11 RX ORDER — CALCIUM GLUCONATE 20 MG/ML
2000 INJECTION, SOLUTION INTRAVENOUS ONCE
Status: COMPLETED | OUTPATIENT
Start: 2024-01-11 | End: 2024-01-11

## 2024-01-11 RX ORDER — GEMFIBROZIL 600 MG/1
600 TABLET, FILM COATED ORAL
Status: DISCONTINUED | OUTPATIENT
Start: 2024-01-11 | End: 2024-01-13 | Stop reason: HOSPADM

## 2024-01-11 RX ORDER — DEXTROSE MONOHYDRATE 50 MG/ML
INJECTION, SOLUTION INTRAVENOUS CONTINUOUS
Status: DISCONTINUED | OUTPATIENT
Start: 2024-01-11 | End: 2024-01-11

## 2024-01-11 RX ORDER — PROPRANOLOL HCL 60 MG
60 CAPSULE, EXTENDED RELEASE 24HR ORAL DAILY
Status: DISCONTINUED | OUTPATIENT
Start: 2024-01-11 | End: 2024-01-13 | Stop reason: HOSPADM

## 2024-01-11 RX ORDER — MAGNESIUM SULFATE IN WATER 40 MG/ML
2000 INJECTION, SOLUTION INTRAVENOUS PRN
Status: DISCONTINUED | OUTPATIENT
Start: 2024-01-11 | End: 2024-01-13 | Stop reason: HOSPADM

## 2024-01-11 RX ORDER — DEXTROSE MONOHYDRATE 100 MG/ML
INJECTION, SOLUTION INTRAVENOUS CONTINUOUS PRN
Status: DISCONTINUED | OUTPATIENT
Start: 2024-01-11 | End: 2024-01-13 | Stop reason: HOSPADM

## 2024-01-11 RX ORDER — POTASSIUM CHLORIDE 7.45 MG/ML
10 INJECTION INTRAVENOUS PRN
Status: DISCONTINUED | OUTPATIENT
Start: 2024-01-11 | End: 2024-01-13 | Stop reason: HOSPADM

## 2024-01-11 RX ORDER — SODIUM CHLORIDE 9 MG/ML
INJECTION, SOLUTION INTRAVENOUS CONTINUOUS
Status: DISCONTINUED | OUTPATIENT
Start: 2024-01-11 | End: 2024-01-12

## 2024-01-11 RX ORDER — DEXTROSE AND SODIUM CHLORIDE 5; .45 G/100ML; G/100ML
INJECTION, SOLUTION INTRAVENOUS CONTINUOUS PRN
Status: DISCONTINUED | OUTPATIENT
Start: 2024-01-11 | End: 2024-01-13 | Stop reason: HOSPADM

## 2024-01-11 RX ORDER — LORAZEPAM 2 MG/ML
1 INJECTION INTRAMUSCULAR ONCE
Status: COMPLETED | OUTPATIENT
Start: 2024-01-11 | End: 2024-01-11

## 2024-01-11 RX ADMIN — POTASSIUM CHLORIDE 10 MEQ: 7.46 INJECTION, SOLUTION INTRAVENOUS at 17:03

## 2024-01-11 RX ADMIN — SODIUM CHLORIDE, PRESERVATIVE FREE 40 MG: 5 INJECTION INTRAVENOUS at 13:04

## 2024-01-11 RX ADMIN — SODIUM PHOSPHATE, MONOBASIC, MONOHYDRATE AND SODIUM PHOSPHATE, DIBASIC, ANHYDROUS 15 MMOL: 142; 276 INJECTION, SOLUTION INTRAVENOUS at 18:03

## 2024-01-11 RX ADMIN — ONDANSETRON 4 MG: 2 INJECTION INTRAMUSCULAR; INTRAVENOUS at 03:51

## 2024-01-11 RX ADMIN — POTASSIUM CHLORIDE 10 MEQ: 7.46 INJECTION, SOLUTION INTRAVENOUS at 18:35

## 2024-01-11 RX ADMIN — HYDROMORPHONE HYDROCHLORIDE 0.5 MG: 1 INJECTION, SOLUTION INTRAMUSCULAR; INTRAVENOUS; SUBCUTANEOUS at 20:15

## 2024-01-11 RX ADMIN — HYDROMORPHONE HYDROCHLORIDE 0.5 MG: 1 INJECTION, SOLUTION INTRAMUSCULAR; INTRAVENOUS; SUBCUTANEOUS at 17:03

## 2024-01-11 RX ADMIN — POTASSIUM CHLORIDE 10 MEQ: 7.46 INJECTION, SOLUTION INTRAVENOUS at 22:36

## 2024-01-11 RX ADMIN — SODIUM CHLORIDE 0.13 UNITS/HR: 9 INJECTION, SOLUTION INTRAVENOUS at 14:49

## 2024-01-11 RX ADMIN — PROPRANOLOL HYDROCHLORIDE 60 MG: 60 CAPSULE, EXTENDED RELEASE ORAL at 13:03

## 2024-01-11 RX ADMIN — LORAZEPAM 1 MG: 2 INJECTION INTRAMUSCULAR; INTRAVENOUS at 20:15

## 2024-01-11 RX ADMIN — FENTANYL CITRATE 25 MCG: 50 INJECTION, SOLUTION INTRAMUSCULAR; INTRAVENOUS at 23:45

## 2024-01-11 RX ADMIN — CALCIUM GLUCONATE 2000 MG: 20 INJECTION, SOLUTION INTRAVENOUS at 17:15

## 2024-01-11 RX ADMIN — SODIUM CHLORIDE, PRESERVATIVE FREE 10 ML: 5 INJECTION INTRAVENOUS at 20:27

## 2024-01-11 RX ADMIN — SODIUM CHLORIDE: 9 INJECTION, SOLUTION INTRAVENOUS at 12:55

## 2024-01-11 RX ADMIN — HYDROMORPHONE HYDROCHLORIDE 0.5 MG: 1 INJECTION, SOLUTION INTRAMUSCULAR; INTRAVENOUS; SUBCUTANEOUS at 12:06

## 2024-01-11 RX ADMIN — HYDROMORPHONE HYDROCHLORIDE 0.5 MG: 1 INJECTION, SOLUTION INTRAMUSCULAR; INTRAVENOUS; SUBCUTANEOUS at 08:26

## 2024-01-11 RX ADMIN — POTASSIUM CHLORIDE 10 MEQ: 7.46 INJECTION, SOLUTION INTRAVENOUS at 23:45

## 2024-01-11 RX ADMIN — Medication 100 MG: at 08:26

## 2024-01-11 RX ADMIN — SODIUM CHLORIDE: 9 INJECTION, SOLUTION INTRAVENOUS at 05:48

## 2024-01-11 RX ADMIN — DEXTROSE AND SODIUM CHLORIDE: 5; 450 INJECTION, SOLUTION INTRAVENOUS at 14:47

## 2024-01-11 RX ADMIN — Medication 1000 MG: at 12:06

## 2024-01-11 RX ADMIN — POTASSIUM CHLORIDE 10 MEQ: 7.46 INJECTION, SOLUTION INTRAVENOUS at 14:19

## 2024-01-11 RX ADMIN — POTASSIUM CHLORIDE 10 MEQ: 7.46 INJECTION, SOLUTION INTRAVENOUS at 12:57

## 2024-01-11 RX ADMIN — GEMFIBROZIL 600 MG: 600 TABLET ORAL at 15:47

## 2024-01-11 RX ADMIN — SODIUM CHLORIDE, PRESERVATIVE FREE 10 ML: 5 INJECTION INTRAVENOUS at 08:42

## 2024-01-11 RX ADMIN — HYDROMORPHONE HYDROCHLORIDE 0.5 MG: 1 INJECTION, SOLUTION INTRAMUSCULAR; INTRAVENOUS; SUBCUTANEOUS at 03:51

## 2024-01-11 RX ADMIN — ENOXAPARIN SODIUM 40 MG: 100 INJECTION SUBCUTANEOUS at 08:25

## 2024-01-11 RX ADMIN — DEXTROSE AND SODIUM CHLORIDE: 5; 450 INJECTION, SOLUTION INTRAVENOUS at 23:04

## 2024-01-11 RX ADMIN — SODIUM CHLORIDE: 9 INJECTION, SOLUTION INTRAVENOUS at 12:49

## 2024-01-11 RX ADMIN — POTASSIUM CHLORIDE 10 MEQ: 7.46 INJECTION, SOLUTION INTRAVENOUS at 21:11

## 2024-01-11 ASSESSMENT — PAIN SCALES - GENERAL
PAINLEVEL_OUTOF10: 8
PAINLEVEL_OUTOF10: 6
PAINLEVEL_OUTOF10: 8
PAINLEVEL_OUTOF10: 7
PAINLEVEL_OUTOF10: 8
PAINLEVEL_OUTOF10: 5
PAINLEVEL_OUTOF10: 8
PAINLEVEL_OUTOF10: 8
PAINLEVEL_OUTOF10: 7
PAINLEVEL_OUTOF10: 8
PAINLEVEL_OUTOF10: 4

## 2024-01-11 ASSESSMENT — PAIN - FUNCTIONAL ASSESSMENT
PAIN_FUNCTIONAL_ASSESSMENT: ACTIVITIES ARE NOT PREVENTED
PAIN_FUNCTIONAL_ASSESSMENT: PREVENTS OR INTERFERES SOME ACTIVE ACTIVITIES AND ADLS
PAIN_FUNCTIONAL_ASSESSMENT: ACTIVITIES ARE NOT PREVENTED
PAIN_FUNCTIONAL_ASSESSMENT: PREVENTS OR INTERFERES SOME ACTIVE ACTIVITIES AND ADLS

## 2024-01-11 ASSESSMENT — PAIN DESCRIPTION - ORIENTATION
ORIENTATION: RIGHT;LEFT;LOWER
ORIENTATION: LEFT;RIGHT;LOWER
ORIENTATION: LEFT;RIGHT;LOWER
ORIENTATION: RIGHT;LEFT;MID;LOWER;UPPER

## 2024-01-11 ASSESSMENT — PAIN DESCRIPTION - LOCATION
LOCATION: ABDOMEN

## 2024-01-11 ASSESSMENT — PAIN DESCRIPTION - DESCRIPTORS
DESCRIPTORS: STABBING
DESCRIPTORS: STABBING;PRESSURE

## 2024-01-11 NOTE — PROGRESS NOTES
Diabetes Education  Referral to diabetes education may have been triggered by order sets.  Will remove Marily from our list. Informed nurse.  Thank you,  Joanna Beauchamp RN

## 2024-01-11 NOTE — CARE COORDINATION
Case Management Assessment  Initial Evaluation    Date/Time of Evaluation: 1/11/2024 5:59 PM  Assessment Completed by: Claudia Morgan RN    If patient is discharged prior to next notation, then this note serves as note for discharge by case management.    Patient Name: Marily Sarah                   YOB: 1980  Diagnosis: Lower abdominal pain [R10.30]  Chest pain, unspecified type [R07.9]  Acute pancreatitis without infection or necrosis [K85.90]                   Date / Time: 1/9/2024  8:48 PM    Patient Admission Status: Inpatient   Readmission Risk (Low < 19, Mod (19-27), High > 27): Readmission Risk Score: 11.1    Current PCP: Asia Amaya MD  PCP verified by CM? Yes (Dr Morris in [place], [place])    Chart Reviewed: Yes      History Provided by: Patient  Patient Orientation: Alert and Oriented, Person, Place, Situation    Patient Cognition: Alert    Hospitalization in the last 30 days (Readmission):  No    If yes, Readmission Assessment in  Navigator will be completed.    Advance Directives:      Code Status: Full Code   Patient's Primary Decision Maker is: Legal Next of Kin      Discharge Planning:    Patient lives with: Spouse/Significant Other, Children Type of Home: House  Primary Care Giver: Self  Patient Support Systems include: Spouse/Significant Other   Current Financial resources: Medicaid  Current community resources:    Current services prior to admission: None            Current DME:              Type of Home Care services:  None    ADLS  Prior functional level: Independent in ADLs/IADLs  Current functional level: Independent in ADLs/IADLs    PT AM-PAC: 24 /24  OT AM-PAC:   /24    Family can provide assistance at DC: Yes  Would you like Case Management to discuss the discharge plan with any other family members/significant others, and if so, who? No  Plans to Return to Present Housing: Yes  Other Identified Issues/Barriers to RETURNING to current housing: na  Potential

## 2024-01-11 NOTE — PROGRESS NOTES
Physical Therapy  Facility/Department: [address] STEPDOWN  Physical Therapy Initial Assessment    Name: Marily Sarah  : 1980  MRN: [id number]  Date of Service: 2024    Discharge Recommendations:  No therapy recommended at discharge   PT Equipment Recommendations  Equipment Needed: No      Patient Diagnosis(es): The primary encounter diagnosis was Chest pain, unspecified type. A diagnosis of Lower abdominal pain was also pertinent to this visit.  Past Medical History:  has a past medical history of Abnormal Pap smear of cervix, Anxiety, Chest pain, Depression, Drug abuse ([facility]), Heartburn, HSV-2 infection, Hypertension, Menorrhagia, Osteoarthritis, Pneumonia due to Staphylococcus aureus ([facility]), PTSD (post-traumatic stress disorder), and Trichomonosis.  Past Surgical History:  has a past surgical history that includes Tubal ligation; other surgical history (2016); Endometrial ablation; and US BREAST BIOPSY W LOC DEVICE 1ST LESION LEFT (Left, 3/3/2021).    Assessment   Assessment: The pt ambulated 150 ft without a device x SBA. She ambulated safely with no c/o dizziness, pain , or fatigue. No further PT intervention is needed at this time.  Therapy Prognosis: Good  Decision Making: Medium Complexity  Requires PT Follow-Up: No  Activity Tolerance  Activity Tolerance: Patient tolerated treatment well     Plan   Physical Therapy Plan  General Plan: Discharge with evaluation only  Safety Devices  Type of Devices: Nurse notified, Left in bed, Gait belt, Call light within reach  Restraints  Restraints Initially in Place: No     Restrictions  Position Activity Restriction  Other position/activity restrictions: amb pt, up with assist     Subjective   General  Patient assessed for rehabilitation services?: Yes  Response To Previous Treatment: Not applicable  Family / Caregiver Present: No  Follows Commands: Within Functional Limits  Subjective  Subjective: The pt denies pain         Social/Functional  History  Social/Functional History  Lives With: Significant other  Type of Home: House  Home Layout: Two level, Able to Live on Main level with bedroom/bathroom  Home Access: Stairs to enter without rails  Entrance Stairs - Number of Steps: 2  Bathroom Shower/Tub: Tub/Shower unit  Bathroom Toilet: Standard  ADL Assistance: Independent  Homemaking Assistance: Independent  Homemaking Responsibilities: Yes  Ambulation Assistance: Independent  Transfer Assistance: Independent  Active : Yes  Mode of Transportation: [facility]  Occupation: On disability  Leisure & Hobbies: Time with family  Additional Comments: The pt is R hand dominate, reports numbness in both of her feet  Vision/Hearing  Vision  Vision: Impaired  Vision Exceptions: Wears glasses at all times  Hearing  Hearing: Within functional limits    Cognition   Orientation  Overall Orientation Status: Within Functional Limits  Orientation Level: Oriented X4  Cognition  Overall Cognitive Status: WFL     Objective   Pulse: 98  Heart Rate Source: Monitor  BP: (!) 124/90  BP Location: Left upper arm  BP Method: Automatic  Patient Position: Semi fowlers  MAP (Calculated): 101  Respirations: 17  SpO2: 95 %  O2 Device: None (Room air)  Temp: 98.5 °F (36.9 °C)            AROM RLE (degrees)  RLE AROM: WNL  AROM LLE (degrees)  LLE AROM : WNL  AROM RUE (degrees)  RUE AROM : WNL  AROM LUE (degrees)  LUE AROM : WNL  Strength RLE  Strength RLE: WNL  Strength LLE  Strength LLE: WNL  Strength RUE  Strength RUE: WNL  Strength LUE  Strength LUE: WNL           Bed mobility  Supine to Sit: Stand by assistance  Sit to Supine: Stand by assistance  Scooting: Stand by assistance  Transfers  Sit to Stand: Stand by assistance  Stand to Sit: Stand by assistance  Ambulation  Surface: Level tile  Device: No Device  Assistance: Stand by assistance  Distance: amb 150 ft without a device x SBA     Balance  Posture: Good  Sitting - Static: Good  Sitting - Dynamic: Good  Standing - Static:

## 2024-01-11 NOTE — PROGRESS NOTES
Occupational Therapy      [facility]  Occupational Therapy Not Seen Note    DATE: 2024    NAME: Marily Sarah  MRN: [id number]   : 1980      Patient not seen this date for Occupational Therapy due to:      Patient independent with ADLs and functional tasks with no acute OT needs. Pt reports independently performing functional mobility to/from bathroom throughout the AM and engaging in toileting and sink side tasks independently. Pt declines any safety concerns for returning home at discharge. Will defer OT evaluation at this time. Please reorder OT if future needs arise.       Electronically signed by Eve Scott OT on 2024 at 11:47 AM

## 2024-01-11 NOTE — PLAN OF CARE
Problem: Discharge Planning  Goal: Discharge to home or other facility with appropriate resources  1/11/2024 0212 by Dari Pulliam RN  Outcome: Progressing  1/11/2024 0212 by Dari Pulliam RN  Outcome: Progressing  1/10/2024 1924 by Stephany Downing RN  Outcome: Progressing     Problem: Pain  Goal: Verbalizes/displays adequate comfort level or baseline comfort level  1/11/2024 0212 by Dari Pulliam RN  Outcome: Progressing  1/11/2024 0212 by Dari Pulliam RN  Outcome: Progressing  1/10/2024 1924 by Stephany Downing RN  Outcome: Progressing

## 2024-01-11 NOTE — CARE COORDINATION
Consult received in quality flow rounds d/t alcohol use.  Met with pt who stated she lives with her bfriend and kids.  [place] thru [employer] ([place]  Pt reports she has been drinking alcohol 3x week, anywhere from a glass of wine to a pint of [employer].  She denies all drug use - stated she has been clean from benzo's/Suboxone since 2016.  Stated she has been thru tx in the past.  Pt stated she is concerned about her drinking and wants to quit. Stated her drinking has increased since summer.  Pt does not wish to pursue tx.  Stated she used to be active in AA (5mtgs/week) but has not been there in 3-4yrs.  Pt stated she would like to get back into AA, \"I love AA\".  Provided pt with AA directory.  Pt stated she does have hx of depression.  Stated she has an appt with a therapist at the [facility].  Pt aware if she wishes to pursue tx, she would have to go thru [facility] d/t her insurance.  She stated she was familiar with this.  Also discussed recovery issues and provided pt with info on recovery/relapse issues.  Support provided.

## 2024-01-11 NOTE — PLAN OF CARE
Problem: Discharge Planning  Goal: Discharge to home or other facility with appropriate resources  1/11/2024 1521 by Katelin Mann RN  Outcome: Progressing  1/11/2024 0212 by Dari Pulliam RN  Outcome: Progressing  1/11/2024 0212 by Dari Pulliam RN  Outcome: Progressing     Problem: Pain  Goal: Verbalizes/displays adequate comfort level or baseline comfort level  1/11/2024 1521 by Katelin Mann RN  Outcome: Progressing  1/11/2024 0212 by Dari Pulliam RN  Outcome: Progressing  1/11/2024 0212 by Dari Pulliam RN  Outcome: Progressing     Problem: Safety - Adult  Goal: Free from fall injury  Outcome: Progressing

## 2024-01-12 LAB
ALBUMIN SERPL-MCNC: 3.1 G/DL (ref 3.5–5.2)
ALBUMIN/GLOB SERPL: 1 {RATIO} (ref 1–2.5)
ALP SERPL-CCNC: 127 U/L (ref 35–104)
ALT SERPL-CCNC: 58 U/L (ref 5–33)
ANION GAP SERPL CALCULATED.3IONS-SCNC: 10 MMOL/L (ref 9–17)
ANION GAP SERPL CALCULATED.3IONS-SCNC: 10 MMOL/L (ref 9–17)
ANION GAP SERPL CALCULATED.3IONS-SCNC: 8 MMOL/L (ref 9–17)
AST SERPL-CCNC: 61 U/L
BILIRUB SERPL-MCNC: 1.2 MG/DL (ref 0.3–1.2)
BUN SERPL-MCNC: 2 MG/DL (ref 6–20)
CALCIUM SERPL-MCNC: 7.1 MG/DL (ref 8.6–10.4)
CALCIUM SERPL-MCNC: 7.3 MG/DL (ref 8.6–10.4)
CALCIUM SERPL-MCNC: 7.4 MG/DL (ref 8.6–10.4)
CHLORIDE SERPL-SCNC: 106 MMOL/L (ref 98–107)
CHLORIDE SERPL-SCNC: 107 MMOL/L (ref 98–107)
CHLORIDE SERPL-SCNC: 108 MMOL/L (ref 98–107)
CHOLEST SERPL-MCNC: 267 MG/DL
CHOLESTEROL/HDL RATIO: 11.6
CO2 SERPL-SCNC: 16 MMOL/L (ref 20–31)
CO2 SERPL-SCNC: 18 MMOL/L (ref 20–31)
CO2 SERPL-SCNC: 18 MMOL/L (ref 20–31)
CREAT SERPL-MCNC: 0.4 MG/DL (ref 0.5–0.9)
CREAT SERPL-MCNC: 0.5 MG/DL (ref 0.5–0.9)
CREAT SERPL-MCNC: 0.5 MG/DL (ref 0.5–0.9)
GFR SERPL CREATININE-BSD FRML MDRD: >60 ML/MIN/1.73M2
GLUCOSE BLD-MCNC: 94 MG/DL (ref 65–105)
GLUCOSE BLD-MCNC: 95 MG/DL (ref 65–105)
GLUCOSE BLD-MCNC: 99 MG/DL (ref 65–105)
GLUCOSE SERPL-MCNC: 100 MG/DL (ref 70–99)
GLUCOSE SERPL-MCNC: 98 MG/DL (ref 70–99)
GLUCOSE SERPL-MCNC: 99 MG/DL (ref 70–99)
HDLC SERPL-MCNC: 23 MG/DL
LDLC SERPL CALC-MCNC: 186 MG/DL (ref 0–130)
LIPASE SERPL-CCNC: 102 U/L (ref 13–60)
POTASSIUM SERPL-SCNC: 4 MMOL/L (ref 3.7–5.3)
POTASSIUM SERPL-SCNC: 4.1 MMOL/L (ref 3.7–5.3)
POTASSIUM SERPL-SCNC: 4.2 MMOL/L (ref 3.7–5.3)
PROT SERPL-MCNC: 6.1 G/DL (ref 6.4–8.3)
SODIUM SERPL-SCNC: 132 MMOL/L (ref 135–144)
SODIUM SERPL-SCNC: 133 MMOL/L (ref 135–144)
SODIUM SERPL-SCNC: 136 MMOL/L (ref 135–144)
TRIGL SERPL-MCNC: 291 MG/DL
TRIGL SERPL-MCNC: 299 MG/DL

## 2024-01-12 PROCEDURE — 83690 ASSAY OF LIPASE: CPT

## 2024-01-12 PROCEDURE — 36415 COLL VENOUS BLD VENIPUNCTURE: CPT

## 2024-01-12 PROCEDURE — 6360000002 HC RX W HCPCS: Performed by: INTERNAL MEDICINE

## 2024-01-12 PROCEDURE — 84478 ASSAY OF TRIGLYCERIDES: CPT

## 2024-01-12 PROCEDURE — 6360000002 HC RX W HCPCS: Performed by: NURSE PRACTITIONER

## 2024-01-12 PROCEDURE — 99232 SBSQ HOSP IP/OBS MODERATE 35: CPT | Performed by: STUDENT IN AN ORGANIZED HEALTH CARE EDUCATION/TRAINING PROGRAM

## 2024-01-12 PROCEDURE — 2580000003 HC RX 258: Performed by: NURSE PRACTITIONER

## 2024-01-12 PROCEDURE — 80061 LIPID PANEL: CPT

## 2024-01-12 PROCEDURE — 80053 COMPREHEN METABOLIC PANEL: CPT

## 2024-01-12 PROCEDURE — 2060000000 HC ICU INTERMEDIATE R&B

## 2024-01-12 PROCEDURE — 80048 BASIC METABOLIC PNL TOTAL CA: CPT

## 2024-01-12 PROCEDURE — 6370000000 HC RX 637 (ALT 250 FOR IP): Performed by: INTERNAL MEDICINE

## 2024-01-12 PROCEDURE — 6370000000 HC RX 637 (ALT 250 FOR IP): Performed by: STUDENT IN AN ORGANIZED HEALTH CARE EDUCATION/TRAINING PROGRAM

## 2024-01-12 PROCEDURE — 2580000003 HC RX 258: Performed by: STUDENT IN AN ORGANIZED HEALTH CARE EDUCATION/TRAINING PROGRAM

## 2024-01-12 PROCEDURE — 82947 ASSAY GLUCOSE BLOOD QUANT: CPT

## 2024-01-12 PROCEDURE — 6360000002 HC RX W HCPCS: Performed by: STUDENT IN AN ORGANIZED HEALTH CARE EDUCATION/TRAINING PROGRAM

## 2024-01-12 RX ORDER — SODIUM CHLORIDE 9 MG/ML
INJECTION, SOLUTION INTRAVENOUS CONTINUOUS
Status: DISCONTINUED | OUTPATIENT
Start: 2024-01-12 | End: 2024-01-13 | Stop reason: HOSPADM

## 2024-01-12 RX ORDER — SODIUM CHLORIDE 9 MG/ML
INJECTION, SOLUTION INTRAVENOUS PRN
Status: CANCELLED | OUTPATIENT
Start: 2024-01-12

## 2024-01-12 RX ORDER — LORAZEPAM 2 MG/ML
1 INJECTION INTRAMUSCULAR ONCE
Status: COMPLETED | OUTPATIENT
Start: 2024-01-12 | End: 2024-01-12

## 2024-01-12 RX ORDER — FOLIC ACID 1 MG/1
1 TABLET ORAL DAILY
Status: DISCONTINUED | OUTPATIENT
Start: 2024-01-12 | End: 2024-01-13 | Stop reason: HOSPADM

## 2024-01-12 RX ORDER — PANTOPRAZOLE SODIUM 40 MG/1
40 TABLET, DELAYED RELEASE ORAL
Status: DISCONTINUED | OUTPATIENT
Start: 2024-01-12 | End: 2024-01-13 | Stop reason: HOSPADM

## 2024-01-12 RX ORDER — LOSARTAN POTASSIUM 50 MG/1
50 TABLET ORAL DAILY
Status: DISCONTINUED | OUTPATIENT
Start: 2024-01-12 | End: 2024-01-13 | Stop reason: HOSPADM

## 2024-01-12 RX ADMIN — FENTANYL CITRATE 25 MCG: 50 INJECTION, SOLUTION INTRAMUSCULAR; INTRAVENOUS at 15:17

## 2024-01-12 RX ADMIN — FENTANYL CITRATE 25 MCG: 50 INJECTION, SOLUTION INTRAMUSCULAR; INTRAVENOUS at 11:12

## 2024-01-12 RX ADMIN — LORAZEPAM 1 MG: 2 INJECTION INTRAMUSCULAR; INTRAVENOUS at 20:46

## 2024-01-12 RX ADMIN — Medication 1000 MG: at 11:07

## 2024-01-12 RX ADMIN — HYDROMORPHONE HYDROCHLORIDE 0.5 MG: 1 INJECTION, SOLUTION INTRAMUSCULAR; INTRAVENOUS; SUBCUTANEOUS at 13:18

## 2024-01-12 RX ADMIN — HYDROMORPHONE HYDROCHLORIDE 0.5 MG: 1 INJECTION, SOLUTION INTRAMUSCULAR; INTRAVENOUS; SUBCUTANEOUS at 21:55

## 2024-01-12 RX ADMIN — GEMFIBROZIL 600 MG: 600 TABLET ORAL at 08:50

## 2024-01-12 RX ADMIN — HYDROMORPHONE HYDROCHLORIDE 0.5 MG: 1 INJECTION, SOLUTION INTRAMUSCULAR; INTRAVENOUS; SUBCUTANEOUS at 01:50

## 2024-01-12 RX ADMIN — Medication 100 MG: at 08:50

## 2024-01-12 RX ADMIN — HYDROMORPHONE HYDROCHLORIDE 0.25 MG: 1 INJECTION, SOLUTION INTRAMUSCULAR; INTRAVENOUS; SUBCUTANEOUS at 09:37

## 2024-01-12 RX ADMIN — HYDROMORPHONE HYDROCHLORIDE 0.5 MG: 1 INJECTION, SOLUTION INTRAMUSCULAR; INTRAVENOUS; SUBCUTANEOUS at 06:34

## 2024-01-12 RX ADMIN — DEXTROSE AND SODIUM CHLORIDE: 5; 450 INJECTION, SOLUTION INTRAVENOUS at 06:38

## 2024-01-12 RX ADMIN — SODIUM CHLORIDE: 9 INJECTION, SOLUTION INTRAVENOUS at 11:07

## 2024-01-12 RX ADMIN — LOSARTAN POTASSIUM 50 MG: 50 TABLET, FILM COATED ORAL at 12:15

## 2024-01-12 RX ADMIN — POTASSIUM CHLORIDE 10 MEQ: 7.46 INJECTION, SOLUTION INTRAVENOUS at 00:49

## 2024-01-12 RX ADMIN — PANTOPRAZOLE SODIUM 40 MG: 40 TABLET, DELAYED RELEASE ORAL at 08:50

## 2024-01-12 RX ADMIN — GEMFIBROZIL 600 MG: 600 TABLET ORAL at 17:24

## 2024-01-12 RX ADMIN — ENOXAPARIN SODIUM 40 MG: 100 INJECTION SUBCUTANEOUS at 08:50

## 2024-01-12 RX ADMIN — HYDROMORPHONE HYDROCHLORIDE 0.5 MG: 1 INJECTION, SOLUTION INTRAMUSCULAR; INTRAVENOUS; SUBCUTANEOUS at 17:25

## 2024-01-12 RX ADMIN — FOLIC ACID 1 MG: 1 TABLET ORAL at 08:50

## 2024-01-12 RX ADMIN — PROPRANOLOL HYDROCHLORIDE 60 MG: 60 CAPSULE, EXTENDED RELEASE ORAL at 08:50

## 2024-01-12 RX ADMIN — SODIUM CHLORIDE, PRESERVATIVE FREE 10 ML: 5 INJECTION INTRAVENOUS at 20:50

## 2024-01-12 ASSESSMENT — PAIN DESCRIPTION - LOCATION
LOCATION: ABDOMEN

## 2024-01-12 ASSESSMENT — PAIN SCALES - GENERAL
PAINLEVEL_OUTOF10: 8
PAINLEVEL_OUTOF10: 8
PAINLEVEL_OUTOF10: 6
PAINLEVEL_OUTOF10: 7
PAINLEVEL_OUTOF10: 8
PAINLEVEL_OUTOF10: 8
PAINLEVEL_OUTOF10: 5
PAINLEVEL_OUTOF10: 6
PAINLEVEL_OUTOF10: 8
PAINLEVEL_OUTOF10: 7
PAINLEVEL_OUTOF10: 6

## 2024-01-12 NOTE — PROGRESS NOTES
Pharmacy Intern New Medication Counseling Note    Medication counseling provided to Marily Sarah  New medications reviewed: Enoxaparin, Azithromycin, Ceftriaxone    Handouts provided to patient include: Medication Side Effects brochure, verbal education and Q&A    Discussed recently initiated medication therapy with patient/caregiver utilizing teachback method.  Reviewed uses and possible side effects of medication and answered all medication-related questions.  Patient/caregiver verbalized understanding.    Ritu Verma, Pharmacy Intern

## 2024-01-12 NOTE — PROGRESS NOTES
Hypertension Mother     Thyroid Disease Mother     Heart Disease Father     Other Father         deaf and legally blind    Breast Cancer Paternal Aunt     Breast Cancer Paternal Aunt     Other Other         aunts & grandmother & cousins father's have vaginal bleeding  issues    Cancer Paternal Cousin 32        cervical       Vitals:  BP (!) 121/93   Pulse 89   Temp 98 °F (36.7 °C) (Oral)   Resp 17   Ht 1.651 m (5' 5\")   Wt 83.9 kg (185 lb)   SpO2 96%   BMI 30.79 kg/m²   Temp (24hrs), Av.8 °F (36.6 °C), Min:97.5 °F (36.4 °C), Max:98 °F (36.7 °C)    Recent Labs     24  0143 24  0245 24  0341 24  0445   POCGLU 99 99 94 95       I/O (24Hr):    Intake/Output Summary (Last 24 hours) at 2024 1404  Last data filed at 2024 1554  Gross per 24 hour   Intake 480 ml   Output --   Net 480 ml       Labs:  Hematology:  Recent Labs     24  2112 24  2243 24  0915   WBC 9.4  --   --    RBC 4.14  --   --    HGB 13.6  --   --    HCT 38.0  --   --    MCV 91.8  --   --    MCH 32.9  --   --    MCHC 35.8*  --   --    RDW 13.1  --   --      --   --    MPV 10.0  --   --    INR  --   --  1.0   DDIMER  --  0.81*  --        Chemistry:  Recent Labs     24  2112 24  2210 24  2317 01/10/24  0208 24  0435 24  1606 24  0030 24  0342 24  0550   NA  --   --    < >  --  134*   < > 132* 133* 136   K  --   --    < >  --  3.5*   < > 4.2 4.1 4.0   CL  --   --    < >  --  105   < > 106 107 108*   CO2  --   --    < >  --  15*   < > 18* 16* 18*   GLUCOSE  --   --    < >  --  101*   < > 99 100* 98   BUN  --   --    < >  --  4*   < > 2* 2* 2*   CREATININE  --   --    < >  --  0.5   < > 0.4* 0.5 0.5   MG  --   --   --   --  2.3  --   --   --   --    ANIONGAP  --   --    < >  --  14   < > 8* 10 10   LABGLOM  --   --    < >  --  >60   < > >60 >60 >60   CALCIUM  --   --    < >  --  6.5*   < > 7.1* 7.3* 7.4*   CAION  --   --   --   --  0.98*  --   --   diet  Patient is motivated to quit alcohol use  Replace potassium  Lovenox for dvt prop    Anticipate discharge in am if tolerating diet.    Prudence Renee MD  1/12/2024  2:04 PM

## 2024-01-12 NOTE — PLAN OF CARE
Problem: Discharge Planning  Goal: Discharge to home or other facility with appropriate resources  1/12/2024 0120 by Dari Pulliam RN  Outcome: Progressing  1/11/2024 1521 by Katelin Mann RN  Outcome: Progressing     Problem: Pain  Goal: Verbalizes/displays adequate comfort level or baseline comfort level  1/12/2024 0120 by Dari Pulliam RN  Outcome: Progressing  1/11/2024 1521 by Katelin Mann RN  Outcome: Progressing     Problem: Safety - Adult  Goal: Free from fall injury  1/12/2024 0120 by Dari Pulliam RN  Outcome: Progressing  1/11/2024 1521 by Katelin Mann RN  Outcome: Progressing

## 2024-01-13 VITALS
OXYGEN SATURATION: 92 % | BODY MASS INDEX: 30.82 KG/M2 | RESPIRATION RATE: 16 BRPM | HEART RATE: 94 BPM | TEMPERATURE: 98.5 F | WEIGHT: 185 LBS | HEIGHT: 65 IN | DIASTOLIC BLOOD PRESSURE: 83 MMHG | SYSTOLIC BLOOD PRESSURE: 117 MMHG

## 2024-01-13 LAB
ALBUMIN SERPL-MCNC: 2.9 G/DL (ref 3.5–5.2)
ALBUMIN/GLOB SERPL: 1 {RATIO} (ref 1–2.5)
ALP SERPL-CCNC: 118 U/L (ref 35–104)
ALT SERPL-CCNC: 50 U/L (ref 5–33)
ANION GAP SERPL CALCULATED.3IONS-SCNC: 8 MMOL/L (ref 9–17)
AST SERPL-CCNC: 56 U/L
BILIRUB SERPL-MCNC: 1 MG/DL (ref 0.3–1.2)
BUN SERPL-MCNC: 2 MG/DL (ref 6–20)
CALCIUM SERPL-MCNC: 8 MG/DL (ref 8.6–10.4)
CHLORIDE SERPL-SCNC: 108 MMOL/L (ref 98–107)
CO2 SERPL-SCNC: 19 MMOL/L (ref 20–31)
CREAT SERPL-MCNC: 0.5 MG/DL (ref 0.5–0.9)
GFR SERPL CREATININE-BSD FRML MDRD: >60 ML/MIN/1.73M2
GLUCOSE SERPL-MCNC: 94 MG/DL (ref 70–99)
MAGNESIUM SERPL-MCNC: 2.3 MG/DL (ref 1.6–2.6)
POTASSIUM SERPL-SCNC: 3.4 MMOL/L (ref 3.7–5.3)
PROT SERPL-MCNC: 5.8 G/DL (ref 6.4–8.3)
SODIUM SERPL-SCNC: 135 MMOL/L (ref 135–144)
TRIGL SERPL-MCNC: 144 MG/DL

## 2024-01-13 PROCEDURE — 6370000000 HC RX 637 (ALT 250 FOR IP): Performed by: INTERNAL MEDICINE

## 2024-01-13 PROCEDURE — 2580000003 HC RX 258: Performed by: NURSE PRACTITIONER

## 2024-01-13 PROCEDURE — 6370000000 HC RX 637 (ALT 250 FOR IP): Performed by: STUDENT IN AN ORGANIZED HEALTH CARE EDUCATION/TRAINING PROGRAM

## 2024-01-13 PROCEDURE — 99239 HOSP IP/OBS DSCHRG MGMT >30: CPT | Performed by: FAMILY MEDICINE

## 2024-01-13 PROCEDURE — 84478 ASSAY OF TRIGLYCERIDES: CPT

## 2024-01-13 PROCEDURE — 80053 COMPREHEN METABOLIC PANEL: CPT

## 2024-01-13 PROCEDURE — 36415 COLL VENOUS BLD VENIPUNCTURE: CPT

## 2024-01-13 PROCEDURE — 6360000002 HC RX W HCPCS: Performed by: NURSE PRACTITIONER

## 2024-01-13 PROCEDURE — 83735 ASSAY OF MAGNESIUM: CPT

## 2024-01-13 PROCEDURE — 2580000003 HC RX 258: Performed by: STUDENT IN AN ORGANIZED HEALTH CARE EDUCATION/TRAINING PROGRAM

## 2024-01-13 RX ORDER — HYDROCODONE BITARTRATE AND ACETAMINOPHEN 5; 325 MG/1; MG/1
1 TABLET ORAL EVERY 8 HOURS PRN
Qty: 6 TABLET | Refills: 0 | Status: SHIPPED | OUTPATIENT
Start: 2024-01-13 | End: 2024-01-15

## 2024-01-13 RX ORDER — BUPROPION HYDROCHLORIDE 300 MG/1
300 TABLET ORAL EVERY MORNING
Qty: 30 TABLET | Refills: 1 | Status: SHIPPED | OUTPATIENT
Start: 2024-01-13

## 2024-01-13 RX ORDER — HYDROCODONE BITARTRATE AND ACETAMINOPHEN 5; 325 MG/1; MG/1
1 TABLET ORAL EVERY 8 HOURS PRN
Qty: 6 TABLET | Refills: 0 | Status: SHIPPED | OUTPATIENT
Start: 2024-01-13 | End: 2024-01-13

## 2024-01-13 RX ORDER — GEMFIBROZIL 600 MG/1
600 TABLET, FILM COATED ORAL
Qty: 60 TABLET | Refills: 3 | Status: SHIPPED | OUTPATIENT
Start: 2024-01-13

## 2024-01-13 RX ORDER — FOLIC ACID 1 MG/1
1 TABLET ORAL DAILY
Qty: 30 TABLET | Refills: 3 | Status: SHIPPED | OUTPATIENT
Start: 2024-01-14 | End: 2024-01-13

## 2024-01-13 RX ORDER — OMEPRAZOLE 20 MG/1
20 CAPSULE, DELAYED RELEASE ORAL DAILY
Qty: 90 CAPSULE | Refills: 1 | Status: SHIPPED | OUTPATIENT
Start: 2024-01-13

## 2024-01-13 RX ORDER — CHLORDIAZEPOXIDE HYDROCHLORIDE 5 MG/1
5 CAPSULE, GELATIN COATED ORAL 3 TIMES DAILY PRN
Qty: 12 CAPSULE | Refills: 0 | Status: SHIPPED | OUTPATIENT
Start: 2024-01-13 | End: 2024-01-13

## 2024-01-13 RX ORDER — FOLIC ACID 1 MG/1
1 TABLET ORAL DAILY
Qty: 30 TABLET | Refills: 3 | Status: SHIPPED | OUTPATIENT
Start: 2024-01-14

## 2024-01-13 RX ORDER — CHLORDIAZEPOXIDE HYDROCHLORIDE 5 MG/1
5 CAPSULE, GELATIN COATED ORAL 3 TIMES DAILY PRN
Qty: 12 CAPSULE | Refills: 0 | Status: SHIPPED | OUTPATIENT
Start: 2024-01-13 | End: 2024-01-17

## 2024-01-13 RX ORDER — GAUZE BANDAGE 2" X 2"
100 BANDAGE TOPICAL DAILY
Qty: 90 TABLET | Refills: 0 | Status: SHIPPED | OUTPATIENT
Start: 2024-01-13 | End: 2024-01-13

## 2024-01-13 RX ORDER — GEMFIBROZIL 600 MG/1
600 TABLET, FILM COATED ORAL
Qty: 60 TABLET | Refills: 3 | Status: SHIPPED | OUTPATIENT
Start: 2024-01-13 | End: 2024-01-13

## 2024-01-13 RX ORDER — GAUZE BANDAGE 2" X 2"
100 BANDAGE TOPICAL DAILY
Qty: 90 TABLET | Refills: 0 | Status: SHIPPED | OUTPATIENT
Start: 2024-01-13

## 2024-01-13 RX ADMIN — ENOXAPARIN SODIUM 40 MG: 100 INJECTION SUBCUTANEOUS at 07:30

## 2024-01-13 RX ADMIN — HYDROMORPHONE HYDROCHLORIDE 0.5 MG: 1 INJECTION, SOLUTION INTRAMUSCULAR; INTRAVENOUS; SUBCUTANEOUS at 05:52

## 2024-01-13 RX ADMIN — Medication 100 MG: at 07:30

## 2024-01-13 RX ADMIN — PROPRANOLOL HYDROCHLORIDE 60 MG: 60 CAPSULE, EXTENDED RELEASE ORAL at 07:30

## 2024-01-13 RX ADMIN — HYDROMORPHONE HYDROCHLORIDE 0.5 MG: 1 INJECTION, SOLUTION INTRAMUSCULAR; INTRAVENOUS; SUBCUTANEOUS at 01:23

## 2024-01-13 RX ADMIN — SODIUM CHLORIDE, PRESERVATIVE FREE 10 ML: 5 INJECTION INTRAVENOUS at 07:33

## 2024-01-13 RX ADMIN — FOLIC ACID 1 MG: 1 TABLET ORAL at 07:30

## 2024-01-13 RX ADMIN — SODIUM CHLORIDE: 9 INJECTION, SOLUTION INTRAVENOUS at 05:56

## 2024-01-13 RX ADMIN — LOSARTAN POTASSIUM 50 MG: 50 TABLET, FILM COATED ORAL at 07:30

## 2024-01-13 RX ADMIN — HYDROMORPHONE HYDROCHLORIDE 0.5 MG: 1 INJECTION, SOLUTION INTRAMUSCULAR; INTRAVENOUS; SUBCUTANEOUS at 09:10

## 2024-01-13 RX ADMIN — PANTOPRAZOLE SODIUM 40 MG: 40 TABLET, DELAYED RELEASE ORAL at 05:51

## 2024-01-13 RX ADMIN — GEMFIBROZIL 600 MG: 600 TABLET ORAL at 05:51

## 2024-01-13 ASSESSMENT — PAIN DESCRIPTION - LOCATION
LOCATION: ABDOMEN

## 2024-01-13 ASSESSMENT — PAIN SCALES - GENERAL
PAINLEVEL_OUTOF10: 6
PAINLEVEL_OUTOF10: 8
PAINLEVEL_OUTOF10: 6

## 2024-01-13 NOTE — PROGRESS NOTES
CLINICAL PHARMACY NOTE: MEDS TO BEDS    Patient picked up chlordiazepoxide and hydrocodone/APAP from outpatient pharmacy. Remaining scripts transferred to [facility] in [place], [place]: bupropion, omeprazole, gemfibrozil, thiamine, and folic acid.

## 2024-01-13 NOTE — PLAN OF CARE
Problem: Discharge Planning  Goal: Discharge to home or other facility with appropriate resources  1/13/2024 0944 by Alison Casper RN  Outcome: Progressing  1/13/2024 0312 by Dari Pulliam RN  Outcome: Progressing     Problem: Pain  Goal: Verbalizes/displays adequate comfort level or baseline comfort level  1/13/2024 0944 by Alison Casper RN  Outcome: Progressing  1/13/2024 0312 by Dari Pulliam, RN  Outcome: Progressing     Problem: Safety - Adult  Goal: Free from fall injury  1/13/2024 0944 by Alison Casper RN  Outcome: Progressing  1/13/2024 0312 by Dari Pulliam, RN  Outcome: Progressing      "Chika Ford is a 80 y.o. female on day 0 of admission presenting with Pneumothorax.    Subjective   Patient seen in follow-up for dyspnea.  On 2 L  nasal cannula oxygen; O2 sats 99%.  Endorses fatigue.  No respiratory complaints.  Denies pain.  Afebrile.       Objective     Physical Exam  Constitutional:       Appearance: She is ill-appearing.   Pulmonary:      Comments: Lungs diminished but clear.        Last Recorded Vitals  Blood pressure 132/61, pulse 96, temperature 36.6 °C (97.9 °F), temperature source Oral, resp. rate 19, height 1.651 m (5' 5\"), weight 98.4 kg (216 lb 14.9 oz), SpO2 98 %.  Intake/Output last 3 Shifts:  I/O last 3 completed shifts:  In: 800 (8.1 mL/kg) [P.O.:800]  Out: 0 (0 mL/kg)   Weight: 98.4 kg           Assessment/Plan   Hydropneumothorax  Malignant pleural effusion  Metastatic non-small cell carcinoma    Continue nasal cannula oxygen  Pulmonary hygiene  Surgery not indicated per CTS, access PleurX catheter and continue to drain per hospice  Palliative Medicine following  Discharge planning-home with hospice today  Please follow-up with Dr. Ruelas if needed          I spent 10 minutes in the professional and overall care of this patient.      Demetra Narayanan, LISSA-CNP      "

## 2024-01-13 NOTE — DISCHARGE SUMMARY
1 tablet by mouth daily  Start taking on: January 14, 2024     gemfibrozil 600 MG tablet  Commonly known as: LOPID  Take 1 tablet by mouth 2 times daily (before meals)     HYDROcodone-acetaminophen 5-325 MG per tablet  Commonly known as: Norco  Take 1 tablet by mouth every 8 hours as needed for Pain for up to 2 days. Intended supply: 3 days. Take lowest dose possible to manage pain Max Daily Amount: 3 tablets     hydrOXYzine HCl 25 MG tablet  Commonly known as: ATARAX  TAKE 1 TABLET BY MOUTH THREE TIMES A DAY AS NEEDED     thiamine mononitrate 100 MG tablet  Take 1 tablet by mouth daily            CHANGE how you take these medications      omeprazole 20 MG delayed release capsule  Commonly known as: PRILOSEC  Take 1 capsule by mouth Daily  What changed: when to take this            CONTINUE taking these medications      buPROPion 300 MG extended release tablet  Commonly known as: WELLBUTRIN XL  Take 1 tablet by mouth every morning     losartan-hydroCHLOROthiazide 50-12.5 MG per tablet  Commonly known as: HYZAAR     MAGnesium-Oxide 400 (240 Mg) MG tablet  Generic drug: magnesium oxide  TAKE ONE TABLET BY MOUTH DAILY     propranolol 60 MG extended release capsule  Commonly known as: INDERAL LA  TAKE ONE CAPSULE BY MOUTH DAILY     vitamin D3 125 MCG (5000 UT) Caps  TAKE ONE CAPSULE BY MOUTH DAILY            STOP taking these medications      azithromycin 250 MG tablet  Commonly known as: ZITHROMAX     diclofenac 50 MG EC tablet  Commonly known as: VOLTAREN     FLUoxetine 20 MG capsule  Commonly known as: PROZAC     fluticasone 50 MCG/ACT nasal spray  Commonly known as: FLONASE     lisinopril 10 MG tablet  Commonly known as: PRINIVIL;ZESTRIL     methylPREDNISolone 4 MG tablet  Commonly known as: MEDROL DOSEPACK     nirmatrelvir/ritonavir 300/100 20 x 150 MG & 10 x 100MG Tbpk  Commonly known as: PAXLOVID     ondansetron 4 MG disintegrating tablet  Commonly known as: ZOFRAN-ODT               Where to Get Your Medications         These medications were sent to [facility] [id number] - [facility] [address] - P [phone] - F [phone]  [facility] , [facility] [address]      Phone: [phone]   chlordiazePOXIDE 5 MG capsule  folic acid 1 MG tablet  gemfibrozil 600 MG tablet  HYDROcodone-acetaminophen 5-325 MG per tablet  thiamine mononitrate 100 MG tablet       These medications were sent to [place], [place] - [facility] - P [phone] - F [phone]  [address] [place] [address]      Phone: [phone]   buPROPion 300 MG extended release tablet  omeprazole 20 MG delayed release capsule         No discharge procedures on file.    Time Spent on discharge is  34 mins in patient examination, evaluation, counseling as well as medication reconciliation, prescriptions for required medications, discharge plan and follow up.    Electronically signed by   Jeanette London MD  1/13/2024  11:17 AM      Thank you Asia Davis MD for the opportunity to be involved in this patient's care.

## 2024-01-13 NOTE — PLAN OF CARE
Problem: Discharge Planning  Goal: Discharge to home or other facility with appropriate resources  1/13/2024 1044 by Alison Casper RN  Outcome: Adequate for Discharge  1/13/2024 0944 by Alison Casper RN  Outcome: Progressing  1/13/2024 0312 by Dari Pulliam RN  Outcome: Progressing     Problem: Pain  Goal: Verbalizes/displays adequate comfort level or baseline comfort level  1/13/2024 1044 by Alison Casper RN  Outcome: Adequate for Discharge  1/13/2024 0944 by Alison Casper RN  Outcome: Progressing  1/13/2024 0312 by Dari Pulliam RN  Outcome: Progressing     Problem: Safety - Adult  Goal: Free from fall injury  1/13/2024 1044 by Alison Casper RN  Outcome: Adequate for Discharge  1/13/2024 0944 by Alison Casper RN  Outcome: Progressing  1/13/2024 0312 by Dari Pulliam RN  Outcome: Progressing

## 2024-01-13 NOTE — CARE COORDINATION
Met with pt again to cont discussing her sobriety.  Pt shared she has a lot of family and friend support.  Stated they have offered to go to AA with her, as some of them are in recovery as well.  Pt was given an AA directory on Thurs.  She shared a few meetings that she has been to in the past and feels comfortable at.  Pt stated she wants to get to 2 AA meetings/week and also wants to start working out a few times a week as well.  Stated family has removed all the alcohol from th home. Pt has an appt with a therapist at [facility] on Wed.  Support provided.

## 2024-01-15 ENCOUNTER — CARE COORDINATION (OUTPATIENT)
Dept: CASE MANAGEMENT | Age: 44
End: 2024-01-15

## 2024-01-15 DIAGNOSIS — K85.20 ALCOHOL-INDUCED ACUTE PANCREATITIS WITHOUT INFECTION OR NECROSIS: Primary | ICD-10-CM

## 2024-01-15 NOTE — CARE COORDINATION
Care Transitions Initial Follow Up Call    Call within 2 business days of discharge: Yes    Patient Current Location:  Home: [address] [place], [place] [address]  [facility] [address]    LPN Care Coordinator contacted the patient by telephone to perform post hospital discharge assessment. Verified name and  with patient as identifiers. Provided introduction to self, and explanation of the LPN Care Coordinator role.     Patient: Marily Sarah Patient : 1980   MRN: [id number]  Reason for Admission: Alcohol induced acute pancreatitis without infection or necrosis  Discharge Date: 24 RARS: Readmission Risk Score: 12.2      Last Discharge Facility       Date Complaint Diagnosis Description Type Department Provider    24 Abdominal Pain; Chest Pain Chest pain, unspecified type ... ED to Hosp-Admission (Discharged) (ADMITTED) Jeanette Tobias MD; Brody King...            Was this an external facility discharge? No Discharge Facility: [facility]    Challenges to be reviewed by the provider   Additional needs identified to be addressed with provider: No  none               Method of communication with provider: none.    Transitions of Care Initial Call:  Explained the role of Care Transition Nurse and the Transition program, patient is agreeable to follow up calls Post discharge from the Hospital.     Spoke to Marily today she reports she feeling better.She has not had any alcohol. She reports that she has a counseling session set up and probably will go to AA meetings. She denies HA , dizziness , sob, N/V/D. She states I have no withdrawal symptoms. I only have some swelling in my ankles. She was advised to elevate. She has on tall socks to assist with swelling. Reports she is eating a healthy diet. B/B fine.     Med rec completed 1111F she still need to  gemfibrozil, Wellbutrin and folic acid. Discussed meds to be stopped from AVS azithromycin, diclofenac  tablet, fluticasone, lisinopril. Medrol dose pack , paxlovid and

## 2024-01-22 ENCOUNTER — CARE COORDINATION (OUTPATIENT)
Dept: CASE MANAGEMENT | Age: 44
End: 2024-01-22

## 2024-01-22 NOTE — CARE COORDINATION
Care Transitions Follow Up Call    Patient Current Location:  Home: [address] [place] [address]    LPN Care Coordinator contacted the patient by telephone to follow up after admission on 2021.  Verified name and  with patient as identifiers.    Patient: Marily Sarah  Patient : 1980   MRN: [id number]  Reason for Admission: Alcohol-induced acute pancreatitis without infection or necrosis  Discharge Date: 24 RARS: Readmission Risk Score: 12.2      Needs to be reviewed by the provider   Additional needs identified to be addressed with provider: No  none             Method of communication with provider: none.    Writer spoke with Marily for a follow up care transitions call. She states she is feeling much better. She reports she has not had any alcohol for 2 week and is watching her diet. Denies ongoing abdominal pain at this time, no nausea or vomiting.   She had her PCP follow up today (is following with PCP Judith Tejada in [place]) and states the appointment went well. He wanted to start her on medication to assist with alcohol withdrawal/craving but she doesn't feel she needs it currently and is hesitant to use it with her history of substance abuse. She will be following up with him again in 30 days. Will end care transitions due to no longer meeting requirements.         Addressed changes since last contact:   had PCP follow up today/  Discussed follow-up appointments. If no appointment was previously scheduled, appointment scheduling offered: Yes.   Is follow up appointment scheduled within 7 days of discharge? No.    Follow Up  No future appointments.  External follow up appointment(s): n/a    LPN Care Coordinator reviewed red flags with patient and discussed any barriers to care and/or understanding of plan of care after discharge. Discussed appropriate site of care based on symptoms and resources available to patient including: PCP. The patient agrees to contact the PCP office for

## 2024-02-23 DIAGNOSIS — K21.9 GASTROESOPHAGEAL REFLUX DISEASE WITHOUT ESOPHAGITIS: ICD-10-CM

## 2024-02-23 RX ORDER — FLUTICASONE PROPIONATE 50 MCG
SPRAY, SUSPENSION (ML) NASAL
OUTPATIENT
Start: 2024-02-23

## 2024-05-02 NOTE — PROGRESS NOTES
----- Message from Angelina Washington MD sent at 5/2/2024  3:16 PM CDT -----  The x-ray of her neck shows that she has significantly enlarged adenoids and tonsils, there is some narrowing because of this. It is otherwise normal.     They can tell her that there is not anything in her neck that should not be there so they can work on advancing the foods as we talked about.     Given the x-ray findings, that could be the reason she is more aware of things going down her throat. There is no restriction to what she can eat, they should still start with soft and advance slowly.     Given the significant enlargement of the adenoids and tonsils, I would like her to see ENT to get their recommendations. She is not snoring so they may recommend monitoring, given the narrowing, they may talk about removing adenoids or tonsils.     Please notify and let me know if mom has questions. Please route back to me for orders.    with past medical history of alcohol use, heroin abuse, hypertension, alcohol use presents to the hospital with abdominal pain, chest pain, patient was noted to have pancreatitis.  Patient had triglycerides above 650.    Medications:     Allergies:    Allergies   Allergen Reactions    Codeine Other (See Comments)     Welts as a child    Morphine Other (See Comments)     hives    Sulfa Antibiotics Other (See Comments)     Welts after dying hair       Current Meds:   Scheduled Meds:    propranolol  60 mg Oral Daily    gemfibrozil  600 mg Oral BID AC    pantoprazole (PROTONIX) 40 mg in sodium chloride (PF) 0.9 % 10 mL injection  40 mg IntraVENous Daily    sodium chloride flush  5-40 mL IntraVENous 2 times per day    enoxaparin  40 mg SubCUTAneous Daily    cefTRIAXone (ROCEPHIN) IV  1,000 mg IntraVENous Q24H    thiamine  100 mg Oral Daily     Continuous Infusions:    insulin regular (HUMULIN R;NOVOLIN R) 0.2 Units/mL in sodium chloride 0.9 % 3 mL infusion      dextrose      dextrose      sodium chloride 150 mL/hr at 01/11/24 0548    sodium chloride       PRN Meds: glucose, dextrose bolus **OR** dextrose bolus, glucagon (rDNA), dextrose, sodium chloride flush, sodium chloride, potassium chloride, magnesium sulfate, HYDROmorphone **OR** HYDROmorphone, ondansetron **OR** ondansetron, fentanNYL    Data:     Past Medical History:   has a past medical history of Abnormal Pap smear of cervix, Anxiety, Chest pain, Depression, Drug abuse (HCC), Heartburn, HSV-2 infection, Hypertension, Menorrhagia, Osteoarthritis, Pneumonia due to Staphylococcus aureus ([facility]), PTSD (post-traumatic stress disorder), and Trichomonosis.    Social History:   reports that she has quit smoking. Her smoking use included cigarettes. She has a 9.0 pack-year smoking history. She has never used smokeless tobacco. She reports current alcohol use. She reports that she does not currently use drugs after having used the following drugs: IV.     Family History:  Problems             Last Modified POA    * (Principal) Alcohol-induced acute pancreatitis without infection or necrosis 1/10/2024 Yes    History of heroin abuse (HCC) 1/10/2024 Yes    Essential hypertension 1/10/2024 Yes    Alcohol use disorder, severe, dependence (HCC) 1/10/2024 Yes       Plan:        Pancreatitis likely secondary to alcohol binge drinking and hypertriglyceridemia  triglycerides uptrending, above 650, continue to keep n.p.o.  will start insulin drip at 0.1 units/kg/h with dextrose containing fluids  check blood sugar every hour  check triglycerides every 12hrs   Insulin drip to continue until triglycerides below 500.    Start gemfibrozil  Monitor LFTs  Restart home propranolol  Continue normal saline at 100 mL/hr  Patient is motivated to quit alcohol use  Replace potassium  Lovenox for dvt prop    Prudence Renee MD  1/11/2024  10:30 AM

## 2024-05-20 NOTE — TELEPHONE ENCOUNTER
Request for   Requested Prescriptions     Pending Prescriptions Disp Refills    omeprazole (PRILOSEC) 20 MG delayed release capsule [Pharmacy Med Name: OMEPRAZOLE DR 20 MG CAPSULE] 60 capsule      Sig: TAKE 1 CAPSULE BY MOUTH TWICE A DAY    .      Please review and e-scribe to pharmacy listed in chart if appropriate. Thank you.      Last Visit Date: 11/9/2023  Next Visit Date: Visit date not found    No future appointments.    Health Maintenance   Topic Date Due    Hepatitis B vaccine (1 of 3 - 3-dose series) Never done    COVID-19 Vaccine (1) Never done    Cervical cancer screen  11/27/2021    Depression Monitoring  02/16/2024    Flu vaccine (Season Ended) 08/01/2024    DTaP/Tdap/Td vaccine (2 - Td or Tdap) 10/07/2026    Lipids  01/12/2029    Hepatitis C screen  Completed    HIV screen  Completed    Hepatitis A vaccine  Aged Out    Hib vaccine  Aged Out    HPV vaccine  Aged Out    Polio vaccine  Aged Out    Meningococcal (ACWY) vaccine  Aged Out    Pneumococcal 0-64 years Vaccine  Aged Out    Varicella vaccine  Discontinued    Diabetes screen  Discontinued       Hemoglobin A1C (%)   Date Value   09/26/2019 4.7   02/05/2018 4.4   03/30/2016 5.2             ( goal A1C is < 7)   No components found for: \"LABMICR\"  No components found for: \"LDLCHOLESTEROL\", \"LDLCALC\"    (goal LDL is <100)   AST (U/L)   Date Value   01/13/2024 56 (H)     ALT (U/L)   Date Value   01/13/2024 50 (H)     BUN (mg/dL)   Date Value   01/13/2024 2 (L)     BP Readings from Last 3 Encounters:   01/13/24 117/83   12/01/23 (!) 154/108   11/21/23 (!) 131/91          (goal 120/80)    All Future Testing planned in [facility]  Lab Frequency Next Occurrence         Patient Active Problem List:     HUE (generalized anxiety disorder)     Panic disorder     History of heroin abuse (HCC)     Essential hypertension     Left breast mass     Breast fibroadenoma, left     Alcohol-induced acute pancreatitis without infection or necrosis     Alcohol use disorder,

## 2024-05-21 RX ORDER — OMEPRAZOLE 20 MG/1
20 CAPSULE, DELAYED RELEASE ORAL 2 TIMES DAILY
Qty: 60 CAPSULE | Refills: 9 | Status: SHIPPED | OUTPATIENT
Start: 2024-05-21

## 2024-05-24 NOTE — TELEPHONE ENCOUNTER
----- Message from Bhavna Montesinos sent at 5/4/2023  8:55 AM EDT -----  Subject: Referral Request    Reason for referral request? Mammogram. Last one was 03/29/2022. Provider patient wants to be referred to(if known):     Provider Phone Number(if known): Additional Information for Provider? Please call patient when order is   ready so that she may be scheduled.  Thank you.  ---------------------------------------------------------------------------  --------------  David GAVIRIA    [phone]; OK to leave message on voicemail  ---------------------------------------------------------------------------  -------------- Contraindicated

## 2024-08-26 ENCOUNTER — HOSPITAL ENCOUNTER (OUTPATIENT)
Dept: MAMMOGRAPHY | Age: 44
Discharge: HOME | End: 2024-08-28
Payer: COMMERCIAL

## 2024-08-26 VITALS — HEIGHT: 65 IN | WEIGHT: 147 LBS | BODY MASS INDEX: 24.49 KG/M2

## 2024-08-26 DIAGNOSIS — Z12.31 VISIT FOR SCREENING MAMMOGRAM: ICD-10-CM

## 2024-08-26 PROCEDURE — 77063 BREAST TOMOSYNTHESIS BI: CPT

## 2025-03-10 ENCOUNTER — HOSPITAL ENCOUNTER (EMERGENCY)
Age: 45
Discharge: HOME | End: 2025-03-10
Attending: EMERGENCY MEDICINE
Payer: COMMERCIAL

## 2025-03-10 ENCOUNTER — APPOINTMENT (OUTPATIENT)
Dept: GENERAL RADIOLOGY | Age: 45
End: 2025-03-10
Payer: COMMERCIAL

## 2025-03-10 ENCOUNTER — APPOINTMENT (OUTPATIENT)
Dept: CT IMAGING | Age: 45
End: 2025-03-10
Payer: COMMERCIAL

## 2025-03-10 VITALS
DIASTOLIC BLOOD PRESSURE: 91 MMHG | TEMPERATURE: 97.5 F | HEART RATE: 69 BPM | OXYGEN SATURATION: 99 % | WEIGHT: 175 LBS | RESPIRATION RATE: 19 BRPM | SYSTOLIC BLOOD PRESSURE: 117 MMHG | BODY MASS INDEX: 29.16 KG/M2 | HEIGHT: 65 IN

## 2025-03-10 DIAGNOSIS — K57.90 DIVERTICULOSIS: Primary | ICD-10-CM

## 2025-03-10 LAB
ALBUMIN SERPL-MCNC: 4.5 G/DL (ref 3.5–5.2)
ALBUMIN/GLOB SERPL: 1.6 {RATIO} (ref 1–2.5)
ALP SERPL-CCNC: 88 U/L (ref 35–104)
ALT SERPL-CCNC: 21 U/L (ref 10–35)
ANION GAP SERPL CALCULATED.3IONS-SCNC: 13 MMOL/L (ref 9–16)
AST SERPL-CCNC: 28 U/L (ref 10–35)
BACTERIA URNS QL MICRO: ABNORMAL
BASOPHILS # BLD: 0.07 K/UL (ref 0–0.2)
BASOPHILS NFR BLD: 1 % (ref 0–2)
BILIRUB SERPL-MCNC: 0.8 MG/DL (ref 0–1.2)
BILIRUB UR QL STRIP: NEGATIVE
BUN SERPL-MCNC: 13 MG/DL (ref 6–20)
CALCIUM SERPL-MCNC: 8.9 MG/DL (ref 8.6–10.4)
CASTS #/AREA URNS LPF: ABNORMAL /LPF (ref 0–8)
CHLORIDE SERPL-SCNC: 101 MMOL/L (ref 98–107)
CLARITY UR: CLEAR
CO2 SERPL-SCNC: 20 MMOL/L (ref 20–31)
COLOR UR: YELLOW
CREAT SERPL-MCNC: 0.7 MG/DL (ref 0.6–0.9)
EOSINOPHIL # BLD: 0.08 K/UL (ref 0–0.44)
EOSINOPHILS RELATIVE PERCENT: 1 % (ref 1–4)
EPI CELLS #/AREA URNS HPF: ABNORMAL /HPF (ref 0–5)
ERYTHROCYTE [DISTWIDTH] IN BLOOD BY AUTOMATED COUNT: 11.9 % (ref 11.8–14.4)
GFR, ESTIMATED: >90 ML/MIN/1.73M2
GLUCOSE SERPL-MCNC: 101 MG/DL (ref 74–99)
GLUCOSE UR STRIP-MCNC: NEGATIVE MG/DL
HCG SERPL QL: NEGATIVE
HCT VFR BLD AUTO: 39.9 % (ref 36.3–47.1)
HGB BLD-MCNC: 14 G/DL (ref 11.9–15.1)
HGB UR QL STRIP.AUTO: NEGATIVE
IMM GRANULOCYTES # BLD AUTO: 0.06 K/UL (ref 0–0.3)
IMM GRANULOCYTES NFR BLD: 1 %
KETONES UR STRIP-MCNC: NEGATIVE MG/DL
LEUKOCYTE ESTERASE UR QL STRIP: NEGATIVE
LIPASE SERPL-CCNC: 21 U/L (ref 13–60)
LYMPHOCYTES NFR BLD: 2.25 K/UL (ref 1.1–3.7)
LYMPHOCYTES RELATIVE PERCENT: 19 % (ref 24–43)
MCH RBC QN AUTO: 29.7 PG (ref 25.2–33.5)
MCHC RBC AUTO-ENTMCNC: 35.1 G/DL (ref 28.4–34.8)
MCV RBC AUTO: 84.5 FL (ref 82.6–102.9)
MONOCYTES NFR BLD: 0.53 K/UL (ref 0.1–1.2)
MONOCYTES NFR BLD: 5 % (ref 3–12)
NEUTROPHILS NFR BLD: 73 % (ref 36–65)
NEUTS SEG NFR BLD: 8.8 K/UL (ref 1.5–8.1)
NITRITE UR QL STRIP: NEGATIVE
NRBC BLD-RTO: 0 PER 100 WBC
PH UR STRIP: 7.5 [PH] (ref 5–8)
PLATELET # BLD AUTO: 263 K/UL (ref 138–453)
PMV BLD AUTO: 9.3 FL (ref 8.1–13.5)
POTASSIUM SERPL-SCNC: 3.8 MMOL/L (ref 3.7–5.3)
PROT SERPL-MCNC: 7.3 G/DL (ref 6.6–8.7)
PROT UR STRIP-MCNC: NEGATIVE MG/DL
RBC # BLD AUTO: 4.72 M/UL (ref 3.95–5.11)
RBC #/AREA URNS HPF: ABNORMAL /HPF (ref 0–4)
SODIUM SERPL-SCNC: 134 MMOL/L (ref 136–145)
SP GR UR STRIP: 1 (ref 1–1.03)
TROPONIN I SERPL HS-MCNC: <6 NG/L (ref 0–14)
UROBILINOGEN UR STRIP-ACNC: NORMAL EU/DL (ref 0–1)
WBC #/AREA URNS HPF: ABNORMAL /HPF (ref 0–5)
WBC OTHER # BLD: 11.8 K/UL (ref 3.5–11.3)

## 2025-03-10 PROCEDURE — 84703 CHORIONIC GONADOTROPIN ASSAY: CPT

## 2025-03-10 PROCEDURE — 71045 X-RAY EXAM CHEST 1 VIEW: CPT

## 2025-03-10 PROCEDURE — 6360000002 HC RX W HCPCS

## 2025-03-10 PROCEDURE — 81001 URINALYSIS AUTO W/SCOPE: CPT

## 2025-03-10 PROCEDURE — 74177 CT ABD & PELVIS W/CONTRAST: CPT

## 2025-03-10 PROCEDURE — 85025 COMPLETE CBC W/AUTO DIFF WBC: CPT

## 2025-03-10 PROCEDURE — 93005 ELECTROCARDIOGRAM TRACING: CPT

## 2025-03-10 PROCEDURE — 83690 ASSAY OF LIPASE: CPT

## 2025-03-10 PROCEDURE — 96374 THER/PROPH/DIAG INJ IV PUSH: CPT | Performed by: EMERGENCY MEDICINE

## 2025-03-10 PROCEDURE — 80053 COMPREHEN METABOLIC PANEL: CPT

## 2025-03-10 PROCEDURE — 84484 ASSAY OF TROPONIN QUANT: CPT

## 2025-03-10 PROCEDURE — 6360000004 HC RX CONTRAST MEDICATION

## 2025-03-10 PROCEDURE — 6370000000 HC RX 637 (ALT 250 FOR IP)

## 2025-03-10 PROCEDURE — 99285 EMERGENCY DEPT VISIT HI MDM: CPT | Performed by: EMERGENCY MEDICINE

## 2025-03-10 RX ORDER — FENTANYL CITRATE 50 UG/ML
50 INJECTION, SOLUTION INTRAMUSCULAR; INTRAVENOUS ONCE
Status: COMPLETED | OUTPATIENT
Start: 2025-03-10 | End: 2025-03-10

## 2025-03-10 RX ORDER — IOPAMIDOL 755 MG/ML
75 INJECTION, SOLUTION INTRAVASCULAR
Status: COMPLETED | OUTPATIENT
Start: 2025-03-10 | End: 2025-03-10

## 2025-03-10 RX ORDER — ONDANSETRON 4 MG/1
4 TABLET, ORALLY DISINTEGRATING ORAL ONCE
Status: COMPLETED | OUTPATIENT
Start: 2025-03-10 | End: 2025-03-10

## 2025-03-10 RX ORDER — ACETAMINOPHEN 500 MG
1000 TABLET ORAL ONCE
Status: COMPLETED | OUTPATIENT
Start: 2025-03-10 | End: 2025-03-10

## 2025-03-10 RX ADMIN — FENTANYL CITRATE 50 MCG: 50 INJECTION, SOLUTION INTRAMUSCULAR; INTRAVENOUS at 12:56

## 2025-03-10 RX ADMIN — IOPAMIDOL 75 ML: 755 INJECTION, SOLUTION INTRAVENOUS at 14:38

## 2025-03-10 RX ADMIN — ONDANSETRON 4 MG: 4 TABLET, ORALLY DISINTEGRATING ORAL at 11:30

## 2025-03-10 RX ADMIN — ACETAMINOPHEN 1000 MG: 500 TABLET ORAL at 11:30

## 2025-03-10 ASSESSMENT — ENCOUNTER SYMPTOMS
SHORTNESS OF BREATH: 1
ABDOMINAL PAIN: 1

## 2025-03-10 ASSESSMENT — PAIN DESCRIPTION - LOCATION
LOCATION: ABDOMEN
LOCATION: ABDOMEN

## 2025-03-10 ASSESSMENT — PAIN DESCRIPTION - ORIENTATION: ORIENTATION: LEFT

## 2025-03-10 ASSESSMENT — PAIN SCALES - GENERAL
PAINLEVEL_OUTOF10: 7
PAINLEVEL_OUTOF10: 8

## 2025-03-10 ASSESSMENT — PAIN DESCRIPTION - DESCRIPTORS: DESCRIPTORS: DULL;SHARP

## 2025-03-10 ASSESSMENT — PAIN - FUNCTIONAL ASSESSMENT: PAIN_FUNCTIONAL_ASSESSMENT: 0-10

## 2025-03-10 ASSESSMENT — LIFESTYLE VARIABLES
HOW MANY STANDARD DRINKS CONTAINING ALCOHOL DO YOU HAVE ON A TYPICAL DAY: 3 OR 4
HOW OFTEN DO YOU HAVE A DRINK CONTAINING ALCOHOL: 4 OR MORE TIMES A WEEK

## 2025-03-10 NOTE — ED NOTES
Pt presents to ED with c/o left lower abdominal pain and shortness of breath.  Pt has hx of alcoholism and reports she stopped drinking from Jan 2024-Sept 2024.  Pt reports she relapsed and has been consuming 6 shots of vodka every day since September, with her last drink being last night.  Pt reports hx of pancreatitis and believes she's having a flare up.  EKG obtained, IV inserted, specimens collected.  Patient alert and oriented x4, talking in complete sentences. Respirations even and unlabored. Call light in reach, all needs met at this time.

## 2025-03-10 NOTE — DISCHARGE INSTRUCTIONS
You are seen the emergency department for your abdominal pain.  You have diverticulosis.  You are also given outpatient resources for alcohol abuse.    PLEASE RETURN TO THE EMERGENCY DEPARTMENT IMMEDIATELY for worsening symptoms, or if you develop any concerning symptoms such as: high fever not relieved by acetaminophen (Tylenol) and/or ibuprofen (Motrin), chills, shortness of breath, chest pain, persistent nausea and/or vomiting, numbness, weakness or tingling in the arms or legs or change in color of the extremities, changes in mental status, persistent headache, blurry vision.    Return within 8 - 12 hours if you have any of the following: worsening of pain in your abdomen, no food sounds good to you, you continue to vomit, pain goes to your back or testicles, have pain in the abdomen when going over a bump in the car or when you jump up and down, inability to urinate, unable to follow up with your physician, or other any other care or concern.

## 2025-03-10 NOTE — ED PROVIDER NOTES
[facility]     Emergency Department     Faculty Attestation  11:48 AM EDT      I performed a history and physical examination of the patient and discussed management with the resident. I have reviewed and agree with the resident’s findings including all diagnostic interpretations, and treatment plans as written. Any areas of disagreement are noted on the chart. I was personally present for the key portions of any procedures. I have documented in the chart those procedures where I was not present during the key portions. I have reviewed the emergency nurses triage note. I agree with the chief complaint, past medical history, past surgical history, allergies, medications, social and family history as documented unless otherwise noted below. Documentation of the HPI, Physical Exam and Medical Decision Making performed by scribcristiano is based on my personal performance of the HPI, PE and MDM. For Physician Assistant/ Nurse Practitioner cases/documentation I have personally evaluated this patient and have completed at least one if not all key elements of the E/M (history, physical exam, and MDM). Additional findings are as noted.    Patient here with left lower quadrant abdominal pain, she does have a history of pancreatitis alcohol induced, states this feels similar.  She denies any vomiting but does describe some nausea, has had some diarrhea which she does state had some blood in it recently but now appears more yellow.  She does have a history of alcohol abuse in the past but was clean up until September through January, and then has been drinking again and reports that she drank heavily over the weekend.  She denies any fever, does describe just generalized pain as well.    Patient on exam does have some slight tenderness in the left lower quadrant, no rebound or guarding, no pain to the right lower quadrant she is otherwise well-appearing awake and alert.    EKG 
  FACULTY SIGN-OUT  ADDENDUM     Care of this patient was assumed from previous attending physician. The patient's initial evaluation and plan have been discussed with the prior provider who initially evaluated the patient.      Attestation  I was available and discussed any additional care issues that arose and coordinated the management plans with the resident(s) caring for the patient during my duty period. Any areas of disagreement with resident's documentation of care or procedures are noted on the chart. I was personally present for the key portions of any/all procedures, during my duty period. I have documented in the chart those procedures where I was not present during the key portions.       ED COURSE      The patient was given the following medications:  Orders Placed This Encounter   Medications    acetaminophen (TYLENOL) tablet 1,000 mg    ondansetron (ZOFRAN-ODT) disintegrating tablet 4 mg    fentaNYL (SUBLIMAZE) injection 50 mcg    iopamidol (ISOVUE-370) 76 % injection 75 mL       RECENT VITALS:   Temp: 97.5 °F (36.4 °C), Pulse: 69, Respirations: 19, BP: (!) 117/91    MEDICAL DECISION MAKING        Marily Sarah is a 44 y.o. female who presents to the Emergency Department with complaints of abdominal pain       Domingo Cadena MD, MD, F.A.C.E.P.  Attending Emergency Physician    (Please note that portions of this note were completed with a voice recognition program.  Efforts were made to edit the dictations but occasionally words are mis-transcribed.)         Domingo Cadena MD  03/10/25 1878    
NEGATIVE   pH, Urine 7.5   Protein, UA NEGATIVE   Urobilinogen Normal   Nitrite, Urine NEGATIVE   Leukocyte Esterase, Urine NEGATIVE   WBC, UA None   RBC, UA 0 TO 2   Casts UA None Reference range defined for non-centrifuged specimen.   Epithelial Cells, UA None   Bacteria, UA None [GI]   1201 Comprehensive Metabolic Panel(!):    Sodium 134(!)   Potassium 3.8   Chloride 101   CARBON DIOXIDE 20   Anion Gap 13   Glucose 101(!)   BUN,BUNPL 13   Creatinine 0.7   Est, Glom Filt Rate >90   Calcium 8.9   Total Protein 7.3   Albumin 4.5   Albumin/Globulin Ratio 1.6   Total Bilirubin 0.8   Alkaline Phosphatase 88   ALT 21   AST 28 [GI]   1201 Troponin, High Sensitivity: <6 [GI]   1215 Lipase: 21 [GI]   1243 Reexamined patient.  Still has having left lower quadrant abdominal tenderness.  Will get CT abdomen pelvis.  Administer more analgesia [GI]   1531 CT ABDOMEN PELVIS W IV CONTRAST Additional Contrast? None  IMPRESSION:  1.  No acute abdominal or pelvic findings.     2.  Mild scattered diverticulosis within the sigmoid colon without evidence  of acute diverticulitis   [GI]   1549 Patient states that she is feeling better.  Repeat abdominal exam no tenderness.  Did discuss with her return instructions she gave verbal understanding.  Did discuss imaging.  She has diverticulosis.  No diverticulitis noted on CT scan.  She will receive outpatient resources for alcohol abuse. [GI]      ED Course User Index  [GI] Armando Santos DO       PROCEDURES:      CONSULTS:  None    CRITICAL CARE:  There was significant risk of life threatening deterioration of patient's condition requiring my direct management. Critical care time minutes, excluding any documented procedures.    FINAL IMPRESSION      1. Diverticulosis          DISPOSITION / PLAN     DISPOSITION Decision To Discharge 03/10/2025 03:44:57 PM   DISPOSITION CONDITION Stable           PATIENT REFERRED TO:  Alexander Morris MD  [address]  [facility]

## 2025-03-12 LAB
EKG ATRIAL RATE: 72 BPM
EKG P AXIS: 57 DEGREES
EKG P-R INTERVAL: 190 MS
EKG Q-T INTERVAL: 426 MS
EKG QRS DURATION: 86 MS
EKG QTC CALCULATION (BAZETT): 466 MS
EKG R AXIS: 80 DEGREES
EKG T AXIS: 45 DEGREES
EKG VENTRICULAR RATE: 72 BPM

## 2025-04-08 ENCOUNTER — OFFICE VISIT (OUTPATIENT)
Dept: PRIMARY CARE CLINIC | Age: 45
End: 2025-04-08
Payer: COMMERCIAL

## 2025-04-08 VITALS
SYSTOLIC BLOOD PRESSURE: 113 MMHG | TEMPERATURE: 97.4 F | HEART RATE: 74 BPM | OXYGEN SATURATION: 98 % | DIASTOLIC BLOOD PRESSURE: 78 MMHG

## 2025-04-08 DIAGNOSIS — R11.0 NAUSEA: ICD-10-CM

## 2025-04-08 DIAGNOSIS — R10.9 ABDOMINAL PAIN, UNSPECIFIED ABDOMINAL LOCATION: Primary | ICD-10-CM

## 2025-04-08 DIAGNOSIS — Z87.19 HISTORY OF DIVERTICULITIS: ICD-10-CM

## 2025-04-08 PROCEDURE — 99213 OFFICE O/P EST LOW 20 MIN: CPT | Performed by: PHYSICIAN ASSISTANT

## 2025-04-08 PROCEDURE — 3078F DIAST BP <80 MM HG: CPT | Performed by: PHYSICIAN ASSISTANT

## 2025-04-08 PROCEDURE — 3074F SYST BP LT 130 MM HG: CPT | Performed by: PHYSICIAN ASSISTANT

## 2025-04-08 RX ORDER — ALBUTEROL SULFATE 90 UG/1
AEROSOL, METERED RESPIRATORY (INHALATION)
COMMUNITY
Start: 2025-02-24

## 2025-04-08 RX ORDER — CIPROFLOXACIN 500 MG/1
TABLET, FILM COATED ORAL
COMMUNITY
Start: 2025-04-02

## 2025-04-08 RX ORDER — PROMETHAZINE HYDROCHLORIDE 12.5 MG/1
12.5 TABLET ORAL 3 TIMES DAILY PRN
Qty: 12 TABLET | Refills: 0 | Status: SHIPPED | OUTPATIENT
Start: 2025-04-08 | End: 2025-04-15

## 2025-04-08 RX ORDER — QUETIAPINE FUMARATE 25 MG/1
TABLET, FILM COATED ORAL
COMMUNITY
Start: 2025-02-15

## 2025-04-08 RX ORDER — BUSPIRONE HYDROCHLORIDE 10 MG/1
TABLET ORAL
COMMUNITY
Start: 2025-03-29

## 2025-04-08 RX ORDER — FLUTICASONE PROPIONATE 50 MCG
SPRAY, SUSPENSION (ML) NASAL
COMMUNITY
Start: 2025-03-16

## 2025-04-08 RX ORDER — ASPIRIN 81 MG/1
1 TABLET ORAL
COMMUNITY

## 2025-04-08 RX ORDER — FLUCONAZOLE 150 MG/1
TABLET ORAL
COMMUNITY
Start: 2025-01-20

## 2025-04-08 RX ORDER — METRONIDAZOLE 500 MG/1
TABLET ORAL
COMMUNITY
Start: 2025-04-02

## 2025-04-08 ASSESSMENT — ENCOUNTER SYMPTOMS
CONSTIPATION: 0
EYES NEGATIVE: 1
NAUSEA: 1
RESPIRATORY NEGATIVE: 1
BLOOD IN STOOL: 0
ABDOMINAL PAIN: 1

## 2025-04-08 NOTE — PROGRESS NOTES
[facility]  [address] [address]  Phone: [phone]  Fax: [phone]       [facility] [place]    Pt Name: Marily Sarah  MRN: [id number]  Birthdate 1980  Date of evaluation: 4/8/2025  Provider: Celeste Phan PA-C     CHIEF COMPLAINT       Chief Complaint   Patient presents with    Nausea     Fatigue, body aches x 2 wks - was seen by Dr Castillo last Monday and was prescribed cipro and flagyl and took those for 2 days then stopped. Was seen at [facility] 3 wks ago and diagnosed with diverticulitis           HISTORY OF PRESENT ILLNESS  (Location/Symptom, Timing/Onset, Context/Setting, Quality, Duration, Modifying Factors, Severity.)   Marily Sarah is a 44 y.o. White (non-) [1] female who presents to the office for evaluation of      Recently diagnosed with diverticulitis. Patient was placed in cipro and flagyl but did not complete them due to side effects.     Nausea & Vomiting  This is a new problem. The current episode started 1 to 4 weeks ago. The problem has been gradually worsening. Associated symptoms include abdominal pain, chills, diaphoresis, fatigue, myalgias and nausea.       Nursing Notes were reviewed.    REVIEW OF SYSTEMS    (2-9 systems for level 4, 10 or more for level 5)     Review of Systems   Constitutional:  Positive for chills, diaphoresis and fatigue.   HENT: Negative.     Eyes: Negative.    Respiratory: Negative.     Cardiovascular: Negative.    Gastrointestinal:  Positive for abdominal pain and nausea. Negative for blood in stool and constipation.   Musculoskeletal:  Positive for myalgias.   Skin: Negative.          Except as noted above the remainder of the review of systems was reviewed andnegative.       PAST MEDICAL HISTORY   History reviewed.    Past Medical History:   Diagnosis Date    Abnormal Pap smear of cervix     Anxiety     Chest pain     pt states had normal stress test    Depression     Drug abuse (HCC)     opiates

## 2025-04-21 ENCOUNTER — OFFICE VISIT (OUTPATIENT)
Age: 45
End: 2025-04-21

## 2025-04-21 VITALS
BODY MASS INDEX: 29.99 KG/M2 | HEART RATE: 66 BPM | OXYGEN SATURATION: 98 % | SYSTOLIC BLOOD PRESSURE: 121 MMHG | TEMPERATURE: 98.1 F | RESPIRATION RATE: 18 BRPM | DIASTOLIC BLOOD PRESSURE: 86 MMHG | WEIGHT: 180 LBS | HEIGHT: 65 IN

## 2025-04-21 DIAGNOSIS — J02.9 SORE THROAT: ICD-10-CM

## 2025-04-21 DIAGNOSIS — J01.00 ACUTE NON-RECURRENT MAXILLARY SINUSITIS: Primary | ICD-10-CM

## 2025-04-21 LAB — S PYO AG THROAT QL: NORMAL

## 2025-04-21 ASSESSMENT — ENCOUNTER SYMPTOMS
SINUS PAIN: 1
GASTROINTESTINAL NEGATIVE: 1
ALLERGIC/IMMUNOLOGIC NEGATIVE: 1
SINUS PRESSURE: 1
COUGH: 1
EYES NEGATIVE: 1
SORE THROAT: 1

## 2025-04-21 NOTE — PROGRESS NOTES
[facility]  [address] [place] [id number]  Phone: [phone]  Fax: [phone]      Marily Sarah  1980  MRN: [id number]  Date of visit: 2025    Chief Complaint:     Marily Sarah (:  1980) is a 45 y.o. female,New patient, here for evaluation of the following chief complaint(s):  Ear Pain, Generalized Body Aches, and Cough      Allergies   Allergen Reactions    Codeine Other (See Comments)     Welts as a child    Morphine Other (See Comments)     hives    Sulfa Antibiotics Other (See Comments)     Welts after dying hair        Current Outpatient Medications   Medication Sig Dispense Refill    amoxicillin-clavulanate (AUGMENTIN) 875-125 MG per tablet Take 1 tablet by mouth 2 times daily for 7 days 14 tablet 0    VENTOLIN  (90 Base) MCG/ACT inhaler  (Patient not taking: Reported on 2025)      aspirin 81 MG EC tablet Take 1 tablet by mouth Every Day      busPIRone (BUSPAR) 10 MG tablet       ciprofloxacin (CIPRO) 500 MG tablet  (Patient not taking: Reported on 2025)      fluconazole (DIFLUCAN) 150 MG tablet  (Patient not taking: Reported on 2025)      fluticasone (FLONASE) 50 MCG/ACT nasal spray       metroNIDAZOLE (FLAGYL) 500 MG tablet       QUEtiapine (SEROQUEL) 25 MG tablet  (Patient not taking: Reported on 2025)      omeprazole (PRILOSEC) 20 MG delayed release capsule TAKE 1 CAPSULE BY MOUTH TWICE A DAY 60 capsule 9    buPROPion (WELLBUTRIN XL) 300 MG extended release tablet Take 1 tablet by mouth every morning (Patient not taking: Reported on 2025) 30 tablet 1    folic acid (FOLVITE) 1 MG tablet Take 1 tablet by mouth daily (Patient not taking: Reported on 2025) 30 tablet 3    gemfibrozil (LOPID) 600 MG tablet Take 1 tablet by mouth 2 times daily (before meals) 60 tablet 3    thiamine mononitrate 100 MG tablet Take 1 tablet by mouth daily (Patient not taking: Reported on 2025) 90 tablet 0

## 2025-06-16 RX ORDER — OMEPRAZOLE 20 MG/1
20 CAPSULE, DELAYED RELEASE ORAL 2 TIMES DAILY
Qty: 60 CAPSULE | Refills: 9 | OUTPATIENT
Start: 2025-06-16

## 2025-06-30 RX ORDER — OMEPRAZOLE 20 MG/1
20 CAPSULE, DELAYED RELEASE ORAL 2 TIMES DAILY
Qty: 60 CAPSULE | Refills: 9 | OUTPATIENT
Start: 2025-06-30